# Patient Record
Sex: FEMALE | Race: WHITE | NOT HISPANIC OR LATINO | Employment: OTHER | URBAN - METROPOLITAN AREA
[De-identification: names, ages, dates, MRNs, and addresses within clinical notes are randomized per-mention and may not be internally consistent; named-entity substitution may affect disease eponyms.]

---

## 2017-12-09 ENCOUNTER — APPOINTMENT (OUTPATIENT)
Dept: CARDIOLOGY | Facility: HOSPITAL | Age: 77
End: 2017-12-09
Attending: INTERNAL MEDICINE

## 2017-12-09 ENCOUNTER — APPOINTMENT (OUTPATIENT)
Dept: GENERAL RADIOLOGY | Facility: HOSPITAL | Age: 77
End: 2017-12-09

## 2017-12-09 ENCOUNTER — HOSPITAL ENCOUNTER (INPATIENT)
Facility: HOSPITAL | Age: 77
LOS: 6 days | Discharge: NURSING, REHAB OR LONG-TERM CARE | End: 2017-12-15
Attending: EMERGENCY MEDICINE | Admitting: INTERNAL MEDICINE

## 2017-12-09 ENCOUNTER — APPOINTMENT (OUTPATIENT)
Dept: CT IMAGING | Facility: HOSPITAL | Age: 77
End: 2017-12-09

## 2017-12-09 DIAGNOSIS — IMO0001: Primary | ICD-10-CM

## 2017-12-09 DIAGNOSIS — Z74.09 IMPAIRED FUNCTIONAL MOBILITY, BALANCE, GAIT, AND ENDURANCE: ICD-10-CM

## 2017-12-09 DIAGNOSIS — Z74.09 IMPAIRED MOBILITY AND ADLS: ICD-10-CM

## 2017-12-09 DIAGNOSIS — Z78.9 IMPAIRED MOBILITY AND ADLS: ICD-10-CM

## 2017-12-09 PROBLEM — I62.9 INTRACRANIAL HEMORRHAGE (HCC): Status: ACTIVE | Noted: 2017-12-09

## 2017-12-09 PROBLEM — I10 ESSENTIAL HYPERTENSION: Status: ACTIVE | Noted: 2017-12-09

## 2017-12-09 PROBLEM — S06.89AA INTRACRANIAL HEMATOMA (HCC): Status: ACTIVE | Noted: 2017-12-09

## 2017-12-09 LAB
ALBUMIN SERPL-MCNC: 3.8 G/DL (ref 3.2–4.8)
ALBUMIN/GLOB SERPL: 1.4 G/DL (ref 1.5–2.5)
ALP SERPL-CCNC: 75 U/L (ref 25–100)
ALT SERPL W P-5'-P-CCNC: 7 U/L (ref 7–40)
ANION GAP SERPL CALCULATED.3IONS-SCNC: 6 MMOL/L (ref 3–11)
APTT PPP: 24 SECONDS (ref 24–31)
AST SERPL-CCNC: 12 U/L (ref 0–33)
BASOPHILS # BLD AUTO: 0.02 10*3/MM3 (ref 0–0.2)
BASOPHILS NFR BLD AUTO: 0.3 % (ref 0–1)
BH CV ECHO MEAS - AO MAX PG (FULL): 2.5 MMHG
BH CV ECHO MEAS - AO MAX PG: 9 MMHG
BH CV ECHO MEAS - AO MEAN PG (FULL): 1 MMHG
BH CV ECHO MEAS - AO MEAN PG: 5 MMHG
BH CV ECHO MEAS - AO ROOT AREA (BSA CORRECTED): 1.9
BH CV ECHO MEAS - AO ROOT AREA: 8 CM^2
BH CV ECHO MEAS - AO ROOT DIAM: 3.2 CM
BH CV ECHO MEAS - AO V2 MAX: 150 CM/SEC
BH CV ECHO MEAS - AO V2 MEAN: 92.5 CM/SEC
BH CV ECHO MEAS - AO V2 VTI: 26.7 CM
BH CV ECHO MEAS - ASC AORTA: 2.8 CM
BH CV ECHO MEAS - BSA(HAYCOCK): 1.7 M^2
BH CV ECHO MEAS - BSA: 1.7 M^2
BH CV ECHO MEAS - BZI_BMI: 27.4 KILOGRAMS/M^2
BH CV ECHO MEAS - BZI_METRIC_HEIGHT: 157.5 CM
BH CV ECHO MEAS - BZI_METRIC_WEIGHT: 68 KG
BH CV ECHO MEAS - EDV(CUBED): 108.5 ML
BH CV ECHO MEAS - EDV(TEICH): 106 ML
BH CV ECHO MEAS - EF(CUBED): 85.3 %
BH CV ECHO MEAS - EF(TEICH): 78.5 %
BH CV ECHO MEAS - ESV(CUBED): 16 ML
BH CV ECHO MEAS - ESV(TEICH): 22.8 ML
BH CV ECHO MEAS - FS: 47.2 %
BH CV ECHO MEAS - IVS/LVPW: 0.76
BH CV ECHO MEAS - IVSD: 1 CM
BH CV ECHO MEAS - LA DIMENSION: 3.2 CM
BH CV ECHO MEAS - LA/AO: 1
BH CV ECHO MEAS - LV MASS(C)D: 137.9 GRAMS
BH CV ECHO MEAS - LV MASS(C)DI: 81.6 GRAMS/M^2
BH CV ECHO MEAS - LV MAX PG: 6.5 MMHG
BH CV ECHO MEAS - LV MEAN PG: 4 MMHG
BH CV ECHO MEAS - LV V1 MAX: 127 CM/SEC
BH CV ECHO MEAS - LV V1 MEAN: 86.1 CM/SEC
BH CV ECHO MEAS - LV V1 VTI: 25.4 CM
BH CV ECHO MEAS - LVIDD: 4.8 CM
BH CV ECHO MEAS - LVIDS: 3 CM
BH CV ECHO MEAS - LVPWD: 1 CM
BH CV ECHO MEAS - MV A MAX VEL: 73.4 CM/SEC
BH CV ECHO MEAS - MV E MAX VEL: 56.2 CM/SEC
BH CV ECHO MEAS - MV E/A: 0.77
BH CV ECHO MEAS - PA ACC SLOPE: 694.5 CM/SEC^2
BH CV ECHO MEAS - PA ACC TIME: 0.14 SEC
BH CV ECHO MEAS - PA MAX PG: 10.1 MMHG
BH CV ECHO MEAS - PA PR(ACCEL): 16.5 MMHG
BH CV ECHO MEAS - PA V2 MAX: 158.5 CM/SEC
BH CV ECHO MEAS - RVDD: 1.9 CM
BH CV ECHO MEAS - SI(AO): 126.9 ML/M^2
BH CV ECHO MEAS - SI(CUBED): 54.7 ML/M^2
BH CV ECHO MEAS - SI(TEICH): 49.2 ML/M^2
BH CV ECHO MEAS - SV(AO): 214.7 ML
BH CV ECHO MEAS - SV(CUBED): 92.5 ML
BH CV ECHO MEAS - SV(TEICH): 83.2 ML
BH CV ECHO MEAS - TAPSE (>1.6): 1.8 CM2
BH CV XLRA - RV BASE: 2.8 CM
BH CV XLRA - RV LENGTH: 5.5 CM
BH CV XLRA - RV MID: 2.5 CM
BH CV XLRA - TDI S': 20.8 CM/SEC
BILIRUB SERPL-MCNC: 0.6 MG/DL (ref 0.3–1.2)
BUN BLD-MCNC: 11 MG/DL (ref 9–23)
BUN/CREAT SERPL: 11 (ref 7–25)
CALCIUM SPEC-SCNC: 8.9 MG/DL (ref 8.7–10.4)
CHLORIDE SERPL-SCNC: 102 MMOL/L (ref 99–109)
CO2 SERPL-SCNC: 27 MMOL/L (ref 20–31)
CREAT BLD-MCNC: 1 MG/DL (ref 0.6–1.3)
DEPRECATED RDW RBC AUTO: 46.4 FL (ref 37–54)
EOSINOPHIL # BLD AUTO: 0.03 10*3/MM3 (ref 0–0.3)
EOSINOPHIL NFR BLD AUTO: 0.4 % (ref 0–3)
ERYTHROCYTE [DISTWIDTH] IN BLOOD BY AUTOMATED COUNT: 14.4 % (ref 11.3–14.5)
GFR SERPL CREATININE-BSD FRML MDRD: 54 ML/MIN/1.73
GFR SERPL CREATININE-BSD FRML MDRD: 66 ML/MIN/1.73
GLOBULIN UR ELPH-MCNC: 2.7 GM/DL
GLUCOSE BLD-MCNC: 166 MG/DL (ref 70–100)
GLUCOSE BLDC GLUCOMTR-MCNC: 245 MG/DL (ref 70–130)
HCT VFR BLD AUTO: 36.6 % (ref 34.5–44)
HGB BLD-MCNC: 11.9 G/DL (ref 11.5–15.5)
HOLD SPECIMEN: NORMAL
HOLD SPECIMEN: NORMAL
IMM GRANULOCYTES # BLD: 0.01 10*3/MM3 (ref 0–0.03)
IMM GRANULOCYTES NFR BLD: 0.1 % (ref 0–0.6)
INR PPP: 1.03
LIPASE SERPL-CCNC: 22 U/L (ref 6–51)
LV EF 2D ECHO EST: 70 %
LYMPHOCYTES # BLD AUTO: 1.44 10*3/MM3 (ref 0.6–4.8)
LYMPHOCYTES NFR BLD AUTO: 19.9 % (ref 24–44)
MAXIMAL PREDICTED HEART RATE: 145 BPM
MCH RBC QN AUTO: 28.5 PG (ref 27–31)
MCHC RBC AUTO-ENTMCNC: 32.5 G/DL (ref 32–36)
MCV RBC AUTO: 87.8 FL (ref 80–99)
MONOCYTES # BLD AUTO: 0.45 10*3/MM3 (ref 0–1)
MONOCYTES NFR BLD AUTO: 6.2 % (ref 0–12)
NEUTROPHILS # BLD AUTO: 5.29 10*3/MM3 (ref 1.5–8.3)
NEUTROPHILS NFR BLD AUTO: 73.1 % (ref 41–71)
PLATELET # BLD AUTO: 224 10*3/MM3 (ref 150–450)
PMV BLD AUTO: 10.1 FL (ref 6–12)
POTASSIUM BLD-SCNC: 3.7 MMOL/L (ref 3.5–5.5)
PROT SERPL-MCNC: 6.5 G/DL (ref 5.7–8.2)
PROTHROMBIN TIME: 11.2 SECONDS (ref 9.6–11.5)
RBC # BLD AUTO: 4.17 10*6/MM3 (ref 3.89–5.14)
SODIUM BLD-SCNC: 135 MMOL/L (ref 132–146)
STRESS TARGET HR: 123 BPM
TROPONIN I SERPL-MCNC: 0.02 NG/ML
WBC NRBC COR # BLD: 7.24 10*3/MM3 (ref 3.5–10.8)
WHOLE BLOOD HOLD SPECIMEN: NORMAL
WHOLE BLOOD HOLD SPECIMEN: NORMAL

## 2017-12-09 PROCEDURE — 99285 EMERGENCY DEPT VISIT HI MDM: CPT

## 2017-12-09 PROCEDURE — 36415 COLL VENOUS BLD VENIPUNCTURE: CPT

## 2017-12-09 PROCEDURE — 70450 CT HEAD/BRAIN W/O DYE: CPT

## 2017-12-09 PROCEDURE — 85610 PROTHROMBIN TIME: CPT | Performed by: EMERGENCY MEDICINE

## 2017-12-09 PROCEDURE — 85730 THROMBOPLASTIN TIME PARTIAL: CPT | Performed by: EMERGENCY MEDICINE

## 2017-12-09 PROCEDURE — 93005 ELECTROCARDIOGRAM TRACING: CPT | Performed by: EMERGENCY MEDICINE

## 2017-12-09 PROCEDURE — 25010000002 DEXAMETHASONE PER 1 MG: Performed by: INTERNAL MEDICINE

## 2017-12-09 PROCEDURE — 80053 COMPREHEN METABOLIC PANEL: CPT | Performed by: EMERGENCY MEDICINE

## 2017-12-09 PROCEDURE — 82962 GLUCOSE BLOOD TEST: CPT

## 2017-12-09 PROCEDURE — 83690 ASSAY OF LIPASE: CPT | Performed by: EMERGENCY MEDICINE

## 2017-12-09 PROCEDURE — 71010 HC CHEST PA OR AP: CPT

## 2017-12-09 PROCEDURE — 99291 CRITICAL CARE FIRST HOUR: CPT | Performed by: INTERNAL MEDICINE

## 2017-12-09 PROCEDURE — 85025 COMPLETE CBC W/AUTO DIFF WBC: CPT | Performed by: EMERGENCY MEDICINE

## 2017-12-09 PROCEDURE — 93306 TTE W/DOPPLER COMPLETE: CPT | Performed by: INTERNAL MEDICINE

## 2017-12-09 PROCEDURE — 93306 TTE W/DOPPLER COMPLETE: CPT

## 2017-12-09 PROCEDURE — 84484 ASSAY OF TROPONIN QUANT: CPT | Performed by: EMERGENCY MEDICINE

## 2017-12-09 RX ORDER — DEXTROSE MONOHYDRATE 25 G/50ML
25 INJECTION, SOLUTION INTRAVENOUS
Status: DISCONTINUED | OUTPATIENT
Start: 2017-12-09 | End: 2017-12-15 | Stop reason: HOSPADM

## 2017-12-09 RX ORDER — MAGNESIUM SULFATE HEPTAHYDRATE 40 MG/ML
4 INJECTION, SOLUTION INTRAVENOUS AS NEEDED
Status: DISCONTINUED | OUTPATIENT
Start: 2017-12-09 | End: 2017-12-15 | Stop reason: HOSPADM

## 2017-12-09 RX ORDER — ACETAMINOPHEN 325 MG/1
650 TABLET ORAL EVERY 4 HOURS PRN
Status: DISCONTINUED | OUTPATIENT
Start: 2017-12-09 | End: 2017-12-15 | Stop reason: HOSPADM

## 2017-12-09 RX ORDER — POTASSIUM CHLORIDE 750 MG/1
40 CAPSULE, EXTENDED RELEASE ORAL AS NEEDED
Status: DISCONTINUED | OUTPATIENT
Start: 2017-12-09 | End: 2017-12-15 | Stop reason: HOSPADM

## 2017-12-09 RX ORDER — FAMOTIDINE 10 MG/ML
20 INJECTION, SOLUTION INTRAVENOUS EVERY 12 HOURS SCHEDULED
Status: DISCONTINUED | OUTPATIENT
Start: 2017-12-09 | End: 2017-12-12 | Stop reason: CLARIF

## 2017-12-09 RX ORDER — POTASSIUM CHLORIDE 7.45 MG/ML
10 INJECTION INTRAVENOUS
Status: DISCONTINUED | OUTPATIENT
Start: 2017-12-09 | End: 2017-12-15 | Stop reason: HOSPADM

## 2017-12-09 RX ORDER — MAGNESIUM SULFATE HEPTAHYDRATE 40 MG/ML
2 INJECTION, SOLUTION INTRAVENOUS AS NEEDED
Status: DISCONTINUED | OUTPATIENT
Start: 2017-12-09 | End: 2017-12-15 | Stop reason: HOSPADM

## 2017-12-09 RX ORDER — NICOTINE POLACRILEX 4 MG
15 LOZENGE BUCCAL
Status: DISCONTINUED | OUTPATIENT
Start: 2017-12-09 | End: 2017-12-15 | Stop reason: HOSPADM

## 2017-12-09 RX ORDER — RANITIDINE 150 MG/1
300 TABLET ORAL 2 TIMES DAILY
COMMUNITY
End: 2017-12-09

## 2017-12-09 RX ORDER — DEXAMETHASONE SODIUM PHOSPHATE 4 MG/ML
8 INJECTION, SOLUTION INTRA-ARTICULAR; INTRALESIONAL; INTRAMUSCULAR; INTRAVENOUS; SOFT TISSUE ONCE
Status: COMPLETED | OUTPATIENT
Start: 2017-12-09 | End: 2017-12-09

## 2017-12-09 RX ORDER — RANITIDINE 300 MG/1
300 TABLET ORAL 2 TIMES DAILY
COMMUNITY

## 2017-12-09 RX ORDER — ASPIRIN 81 MG/1
81 TABLET ORAL DAILY
COMMUNITY
End: 2017-12-15 | Stop reason: HOSPADM

## 2017-12-09 RX ORDER — PHENYLEPHRINE HCL IN 0.9% NACL 0.5 MG/5ML
.5-3 SYRINGE (ML) INTRAVENOUS
Status: DISCONTINUED | OUTPATIENT
Start: 2017-12-09 | End: 2017-12-12

## 2017-12-09 RX ORDER — ASPIRIN 81 MG/1
81 TABLET, CHEWABLE ORAL DAILY
COMMUNITY
End: 2017-12-09

## 2017-12-09 RX ORDER — SODIUM CHLORIDE 9 MG/ML
75 INJECTION, SOLUTION INTRAVENOUS CONTINUOUS
Status: DISCONTINUED | OUTPATIENT
Start: 2017-12-09 | End: 2017-12-11

## 2017-12-09 RX ORDER — NITROGLYCERIN 0.4 MG/1
0.4 TABLET SUBLINGUAL
COMMUNITY
End: 2017-12-15 | Stop reason: HOSPADM

## 2017-12-09 RX ORDER — PROPRANOLOL HYDROCHLORIDE 60 MG/1
60 TABLET ORAL 2 TIMES DAILY
Status: ON HOLD | COMMUNITY
End: 2017-12-11

## 2017-12-09 RX ORDER — POTASSIUM CHLORIDE 1.5 G/1.77G
40 POWDER, FOR SOLUTION ORAL AS NEEDED
Status: DISCONTINUED | OUTPATIENT
Start: 2017-12-09 | End: 2017-12-15 | Stop reason: HOSPADM

## 2017-12-09 RX ORDER — AMLODIPINE BESYLATE 2.5 MG/1
2.5 TABLET ORAL DAILY
COMMUNITY
End: 2018-01-05 | Stop reason: HOSPADM

## 2017-12-09 RX ORDER — ACETAMINOPHEN 650 MG/1
650 SUPPOSITORY RECTAL EVERY 4 HOURS PRN
Status: DISCONTINUED | OUTPATIENT
Start: 2017-12-09 | End: 2017-12-15 | Stop reason: HOSPADM

## 2017-12-09 RX ORDER — SODIUM CHLORIDE 0.9 % (FLUSH) 0.9 %
10 SYRINGE (ML) INJECTION AS NEEDED
Status: DISCONTINUED | OUTPATIENT
Start: 2017-12-09 | End: 2017-12-09 | Stop reason: SDUPTHER

## 2017-12-09 RX ORDER — FENTANYL CITRATE 50 UG/ML
25 INJECTION, SOLUTION INTRAMUSCULAR; INTRAVENOUS
Status: DISCONTINUED | OUTPATIENT
Start: 2017-12-09 | End: 2017-12-12

## 2017-12-09 RX ORDER — SIMVASTATIN 20 MG
20 TABLET ORAL NIGHTLY
COMMUNITY

## 2017-12-09 RX ORDER — SODIUM CHLORIDE 0.9 % (FLUSH) 0.9 %
1-10 SYRINGE (ML) INJECTION AS NEEDED
Status: DISCONTINUED | OUTPATIENT
Start: 2017-12-09 | End: 2017-12-15 | Stop reason: HOSPADM

## 2017-12-09 RX ORDER — ONDANSETRON 2 MG/ML
4 INJECTION INTRAMUSCULAR; INTRAVENOUS EVERY 6 HOURS PRN
Status: DISCONTINUED | OUTPATIENT
Start: 2017-12-09 | End: 2017-12-15 | Stop reason: HOSPADM

## 2017-12-09 RX ADMIN — NICARDIPINE HYDROCHLORIDE 7.5 MG/HR: 0.1 INJECTION, SOLUTION INTRAVENOUS at 16:22

## 2017-12-09 RX ADMIN — SODIUM CHLORIDE 75 ML/HR: 9 INJECTION, SOLUTION INTRAVENOUS at 17:15

## 2017-12-09 RX ADMIN — NICARDIPINE HYDROCHLORIDE 12.5 MG/HR: 0.1 INJECTION, SOLUTION INTRAVENOUS at 20:13

## 2017-12-09 RX ADMIN — NICARDIPINE HYDROCHLORIDE 10 MG/HR: 0.1 INJECTION, SOLUTION INTRAVENOUS at 23:00

## 2017-12-09 RX ADMIN — NICARDIPINE HYDROCHLORIDE 10 MG/HR: 0.1 INJECTION, SOLUTION INTRAVENOUS at 18:29

## 2017-12-09 RX ADMIN — NICARDIPINE HYDROCHLORIDE 5 MG/HR: 0.1 INJECTION, SOLUTION INTRAVENOUS at 15:43

## 2017-12-09 RX ADMIN — FAMOTIDINE 20 MG: 10 INJECTION, SOLUTION INTRAVENOUS at 20:08

## 2017-12-09 RX ADMIN — DEXAMETHASONE SODIUM PHOSPHATE 8 MG: 4 INJECTION, SOLUTION INTRAMUSCULAR; INTRAVENOUS at 17:27

## 2017-12-09 NOTE — H&P
ICU ADMISSION NOTE    Chief complaint sided weakness    Subjective     Patient is a 77 y.o. female was not answering her daughter this morning. She lives next door to her sister-in-law who has a key. When she entered the house the patient was still in bed and could not move her left side and was mumbling. She called the ambulance. She did talk to a family member on the phone last night and was normal. In the emergency room her CT scan revealed a right frontal lobe hemorrhage with edema and mass effect. She had significant hypertension and was started on a Cardene drip. Initially she was not moving her left side but her symptoms did not improve and she was able to lift her left leg. She does have a history of transient ischemic attacks and had one approximately one week ago. There is no history of atrial fibrillation she is a lifetime nonsmoker. She does use some nitroglycerin but denies ever having a heart attack. She is a lifetime nonsmoker.    Review of Systems  Review of Systems   Constitutional: Positive for activity change. Negative for fever.   Eyes: Positive for visual disturbance.   Respiratory: Negative.    Cardiovascular: Negative.    Gastrointestinal: Negative.    Musculoskeletal: Positive for arthralgias.   Neurological: Positive for facial asymmetry, speech difficulty and weakness. Negative for headaches.        Home Medications  Currently her granddaughter in law is at the bedside and does not know what her home medications include      (Not in a hospital admission)    History  Past Medical History:   Diagnosis Date   • Dementia    • Diabetes mellitus    • GERD (gastroesophageal reflux disease)    • Hypertension    • Rheumatoid arthritis    • TIA (transient ischemic attack)      Past Surgical History:   Procedure Laterality Date   • CATARACT EXTRACTION, BILATERAL     • EYE SURGERY       Family History   Problem Relation Age of Onset   • Family history unknown: Yes     Social History   Substance Use  "Topics   • Smoking status: Never Smoker   • Smokeless tobacco: None   • Alcohol use No       (Not in a hospital admission)  Allergies:  Penicillins      Objective     Vital Signs  Blood pressure 153/61, pulse 105, temperature 98.7 °F (37.1 °C), temperature source Oral, resp. rate 18, height 157.5 cm (62\"), weight 68 kg (150 lb), SpO2 95 %.    Physical Exam:  General Appearance:  Thin elderly white woman in no distress    Head:  Normocephalic, atraumatic    Eyes:          Eyes are deviated to the right, pupils are equal and reactive    Ears:     Throat: Oral mucosa is moist    Neck: Neck is supple, no carotid bruit    Back:      Lungs:   Symmetric chest expansion without wheeze or rhonchi     Heart:  Irregular, frequent early beats, S1, S2 auscultated  SERGEI   Abdomen:   L sounds are present, nondistended, soft    Rectal:     Deferred   Extremities:    No edema or cyanosis    Pulses:   Pedal pulses present    Skin: Warm and dry    Lymph nodes:    Neurologic:   Left upper extremity is weak, 4 out of 5 and has a resting tremor. Left upper extremity is 4 out of 5. She can lift it off the bed and hold it but it will drift downward. Right side is 5 out of 5 upper and lower extremities. She is oriented to person and hospital but does not know the date. Eyes are deviated to the right and she neglects her left side. Left facial weakness. Speech is fluent.        Results Review:   Lab Results (last 24 hours)     Procedure Component Value Units Date/Time    Lavender Top [[id number]] Collected:  12/09/17 1539    Specimen:  Blood Updated:  12/09/17 1542    Gold Top - SST [[id number]] Collected:  12/09/17 1539    Specimen:  Blood Updated:  12/09/17 1542    Green Top (Gel) [[id number]] Collected:  12/09/17 1539    Specimen:  Blood Updated:  12/09/17 1542    Light Blue Top [[id number]] Collected:  12/09/17 1539    Specimen:  Blood Updated:  12/09/17 1542    [place] Draw [[id number]] Collected:  12/09/17 1539    Specimen:  Blood Updated:  " 12/09/17 1545    Narrative:       The following orders were created for panel order [place] [facility].  Procedure                               Abnormality         Status                     ---------                               -----------         ------                     Light Blue Top[[id number]]                                   In process                 Green Top (Gel)[[id number]]                                  In process                 Lavender Top[[id number]]                                     In process                 Gold Top - SST[[id number]]                                   In process                 Green Top (No Gel)[[id number]]                                                            Please view results for these tests on the individual orders.    CBC & Differential [[id number]] Collected:  12/09/17 1539    Specimen:  Blood Updated:  12/09/17 1547    Narrative:       The following orders were created for panel order CBC & Differential.  Procedure                               Abnormality         Status                     ---------                               -----------         ------                     CBC Auto Differential[[id number]]        Abnormal            Final result                 Please view results for these tests on the individual orders.    CBC Auto Differential [[id number]]  (Abnormal) Collected:  12/09/17 1539    Specimen:  Blood Updated:  12/09/17 1547     WBC 7.24 10*3/mm3      RBC 4.17 10*6/mm3      Hemoglobin 11.9 g/dL      Hematocrit 36.6 %      MCV 87.8 fL      MCH 28.5 pg      MCHC 32.5 g/dL      RDW 14.4 %      RDW-SD 46.4 fl      MPV 10.1 fL      Platelets 224 10*3/mm3      Neutrophil % 73.1 (H) %      Lymphocyte % 19.9 (L) %      Monocyte % 6.2 %      Eosinophil % 0.4 %      Basophil % 0.3 %      Immature Grans % 0.1 %      Neutrophils, Absolute 5.29 10*3/mm3      Lymphocytes, Absolute 1.44 10*3/mm3      Monocytes, Absolute 0.45 10*3/mm3      Eosinophils, Absolute 0.03  10*3/mm3      Basophils, Absolute 0.02 10*3/mm3      Immature Grans, Absolute 0.01 10*3/mm3     Comprehensive Metabolic Panel [[id number]]  (Abnormal) Collected:  12/09/17 1539    Specimen:  Blood Updated:  12/09/17 1610     Glucose 166 (H) mg/dL      BUN 11 mg/dL      Creatinine 1.00 mg/dL      Sodium 135 mmol/L      Potassium 3.7 mmol/L      Chloride 102 mmol/L      CO2 27.0 mmol/L      Calcium 8.9 mg/dL      Total Protein 6.5 g/dL      Albumin 3.80 g/dL      ALT (SGPT) 7 U/L      AST (SGOT) 12 U/L      Alkaline Phosphatase 75 U/L      Total Bilirubin 0.6 mg/dL      eGFR Non African Amer 54 (L) mL/min/1.73      eGFR  African Amer 66 mL/min/1.73      Globulin 2.7 gm/dL      A/G Ratio 1.4 (L) g/dL      BUN/Creatinine Ratio 11.0     Anion Gap 6.0 mmol/L     Narrative:       [employer] Guidelines    Stage     Description        GFR  1         Normal or High     90+  2         Mild decrease      60-89  3         Moderate decrease  30-59  4         Severe decrease    15-29  5         Kidney failure     <15    Lipase [[id number]]  (Normal) Collected:  12/09/17 1539    Specimen:  Blood Updated:  12/09/17 1610     Lipase 22 U/L     Troponin [[id number]]  (Normal) Collected:  12/09/17 1539    Specimen:  Blood Updated:  12/09/17 1614     Troponin I 0.018 ng/mL     Protime-INR [[id number]] Collected:  12/09/17 1539    Specimen:  Blood Updated:  12/09/17 1620     Protime 11.2 Seconds      INR 1.03    Narrative:       Therapeutic Ranges for INR: 2.0-3.0 (PT 20-30)                              2.5-3.5 (PT 25-34)    aPTT [[id number]]  (Normal) Collected:  12/09/17 1539    Specimen:  Blood Updated:  12/09/17 1620     PTT 24.0 seconds     Narrative:       PTT = The equivalent PTT values for the therapeutic range of heparin levels at 0.3 to 0.5 U/ml are 45 to 60 seconds.        Imaging Results (last 24 hours)     Procedure Component Value Units Date/Time    CT Head Without Contrast Stroke Protocol [[id number]] Collected:   12/09/17 1531     Updated:  12/09/17 1546    Narrative:       EXAMINATION: CT HEAD WO CONTRAST STROKE PROTOCOL-      INDICATION: Stroke Protocol (onset > 12 hrs)         TECHNIQUE:      CT data set of the brain was performed without intravenous contrast.     The radiation dose reduction device was turned on for each scan per the  ALARA (As Low as Reasonably Achievable) protocol.     COMPARISON: NONE     FINDINGS:   1.Intra-axial dominant right cerebral hemorrhage is seen in the right  frontal parietal region with a rounded lobular configuration of high  attenuation intra-axial blood surrounded by edema and creating mass  effect. The intra-axial bleed measures 3.7 x 3.8 cm. Volumetric  measurement is 12.3 cubic centimeters.  2. A second intra-axial hemorrhage is identified in the right occipital  area. This could represent a calcified meningioma along the posterior  right falx but a small petechial hemorrhage cannot be excluded and this  measures 1.4 cm.  3. There is evidence of a subarachnoid component over the right  convexity with a small petechial hemorrhages at the right convexity.  There is mass effect with edema and there is a slight midline shift from  right to left.             Impression:          Intra-axial cerebral bleed, right frontal parietal, with surrounding  edema and mass effect. Measurements and volumetric measurements are  offered above.     There is a 14 mm high attenuation structure adjacent to the posterior  right falx cerebri which could be a densely calcified meningioma or  second hemorrhage.     There is a small area of subarachnoid bleed along the right parietal  convexity in the sulcal GYRAL recesses.     There is considerable mass effect in the right hemisphere.              This report was finalized on 12/9/2017 3:44 PM by Dr. Jaxon Martins MD.       XR Chest 1 View [[id number]] Updated:  12/09/17 1616       Test Reason : Stroke Protocol (onset > 12 hrs)  Blood Pressure : **/**  mmHG  Vent. Rate : 071 BPM     Atrial Rate : 071 BPM     P-R Int : 202 ms          QRS Dur : 114 ms      QT Int : 448 ms       P-R-T Axes : 090 -45 020 degrees     QTc Int : 486 ms    Normal sinus rhythm  Incomplete right bundle branch block  Left anterior fascicular block  Moderate voltage criteria for LVH, may be normal variant  Abnormal ECG  No previous ECGs available  Confirmed by JULY OLIVIA, HOLIS ([id number]) on 12/9/2017 3:55:18 PM    PROBLEM LIST    #1  intracranial hemorrhage, right frontal lobe with edema and mass effect resulting in left facial and left-sided weakness. There is also a possible meningioma. This is likely a hypertensive bleed. He has no history of head trauma.    #2  poorly controlled hypertension currently on a Cardene drip with improvement.    #3  underlying dementia with a history of transient ischemic attacks    #4  stress hyperglycemia    Plan:  Cardene drip for hypertension  Neuro checks every hour, NIHSS  Speech, physical therapy, occupational therapy evaluation  Echocardiogram, carotid duplex to evaluate her transient ischemic attacks  Neurosurgery to evaluate  Consider Decadron for edema  Sliding-scale insulin  Once she passes a swallow evaluation consider oral antihypertensives    Aury Espitia MD  12/09/17  4:40 PM    Time: 55min    This note was produced with a voice recognition program and may have uncorrected errors.

## 2017-12-09 NOTE — CONSULTS
"NEUROSURGERY CONSULTATION    Referring Provider: Juan Miguel Van MD    Patient Care Team:  No Known Provider as PCP - General    Chief Complaint: Left-sided weakness.    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  Several days ago she apparently developed some mouth drooping which resolved.  She did talk with family members last evening and everything was in order.  Today she did not answer and she was found to be in bed with left sided weakness and mumbling speech.  She was brought to the emergency room where her blood pressure was noted to be elevated.  She does have a history of hypertension.  She is not on blood thinners.      Review of Systems:  Musculoskeletal and Neurological systems were reviewed and are negative except for:  Neurological: positive for headaches and weakness    History:  Past Medical History:   Diagnosis Date   • Dementia    • Diabetes mellitus    • GERD (gastroesophageal reflux disease)    • Hypertension    • Rheumatoid arthritis    • TIA (transient ischemic attack)    , Past Surgical History:   Procedure Laterality Date   • CATARACT EXTRACTION, BILATERAL     • EYE SURGERY     , Family History   Problem Relation Age of Onset   • Family history unknown: Yes   , Social History   Substance Use Topics   • Smoking status: Never Smoker   • Smokeless tobacco: None   • Alcohol use No   ,   (Not in a hospital admission) and Allergies:  Penicillins      Physical Exam:  Vital Signs: Blood pressure 138/73, pulse 106, temperature 98.7 °F (37.1 °C), temperature source Oral, resp. rate 18, height 157.5 cm (62\"), weight 68 kg (150 lb), SpO2 96 %.   There is no overt cranial trauma.  There is no raccoon's sign or garcía sign.  MUSCULOSKELETAL:  Neck tenderness to palpation is not observed.  Neck is supple.  ROM in neck is normal.  NEUROLOGICAL:  Strength is intact on her right side.  On the left side her strength is 3-4 " minus/5.  Muscle tone is normal throughout.  Sensation is diminished on the left side.  There is left-sided neglect.  Deep tendon reflexes are 1+ and symmetrical.  Maritza's Sign is negative bilaterally.   CRANIAL NERVES:  Cranial Nerve II:  Visual fields are full to confrontation.  Cranial Nerve III, IV, and VI: PERRLADC.  Right eye deviation is noted.  Nystagmus is not present.  Cranial Nerve V: Facial sensation is intact to light touch.  Cranial Nerve VII: Muscles of facial expression demonstate left facial weakness.  Cranial Nerve VIII: Hearing is intact to finger rub bilaterally.  Cranial Nerve IX and X: Palate elevates symmetrically.  Cranial Nerve XI: Shoulder shrug is intact bilaterally.  Cranial Nerve XII: Tongue is midline without evidence of atrophy or fasciculation.      Data Review:  CT of the brain demonstrates diffuse atrophy.  There are multiple areas of hemorrhage with the largest being in the right frontal region.  This was measured by the radiologist to be 3.7 x 3.8 cm.  Volume was said to be 12.3 mL's.  There is modest right to left shift.  There is a smaller area of hemorrhage posterior and superior to the ICH noted above.  A third area of hemorrhage is noted just right of midline in the occipital region.    Diagnosis:  Multiple areas of hemorrhage potentially secondary to hemorrhagic embolic infarction.    Treatment Recommendations:  The patient is to be admitted to the intensive care unit for observation and monitoring.  Follow-up CT will be performed in the morning.  If the large area of hemorrhage increases in size or she develops significant swelling then craniotomy will be a consideration.      Leo Proctor MD  12/09/17  6:43 PM

## 2017-12-09 NOTE — ED PROVIDER NOTES
Subjective   HPI Comments: Ms. Lois Brennan is a 77 y.o. female who presents to the ED with c/o stroke sx. EMS brings in this patient after her neighbor called in for a wellness check. No one had been able to contact her since last night and family had been concerned. EMS notes of initial right sided facial droop but that has improved at first. She had right sided eye deviation with left sided paralysis. Pt herself notes of no hx of CVA that she knows of and denies any recent headaches, falls, head trauma or anticoagulation use. She has no other acute sx at this time. Last known well was yesterday evening.     Patient is a 77 y.o. female presenting with neurologic complaint.   History provided by:  EMS personnel  History limited by:  Acuity of condition  Neurologic Problem   The patient's primary symptoms include focal weakness and weakness (Left sided paralysis). This is a new problem. The current episode started today. The neurological problem developed suddenly. Last Known Well Instant: Yesterday evening.  There was facial and right-sided focality noted. Pertinent negatives include no headaches. There is no history of a CVA or head trauma.       Review of Systems   Unable to perform ROS: Acuity of condition   Neurological: Positive for focal weakness, facial asymmetry (Right) and weakness (Left sided paralysis). Negative for headaches.       Past Medical History:   Diagnosis Date   • Arthritis    • Dementia    • Diabetes mellitus    • GERD (gastroesophageal reflux disease)    • Hypertension    • TIA (transient ischemic attack)        Allergies   Allergen Reactions   • Penicillins      unknown       Past Surgical History:   Procedure Laterality Date   • EYE SURGERY         History reviewed. No pertinent family history.    Social History     Social History   • Marital status: N/A     Spouse name: N/A   • Number of children: N/A   • Years of education: N/A     Social History Main Topics   • Smoking status: Never  Smoker   • Smokeless tobacco: None   • Alcohol use No   • Drug use: No   • Sexual activity: Defer     Other Topics Concern   • None     Social History Narrative   • None         Objective   Physical Exam   Constitutional: She appears well-developed and well-nourished. No distress.   HENT:   Head: Normocephalic and atraumatic.   Nose: Nose normal.   Eyes: Conjunctivae are normal. No scleral icterus.   Right sided eye deviation   Neck: Normal range of motion. Neck supple.   Cardiovascular: Normal rate, regular rhythm, normal heart sounds and intact distal pulses.    No murmur heard.  Pulmonary/Chest: Effort normal and breath sounds normal. No respiratory distress.   Abdominal: Soft. There is no tenderness.   Musculoskeletal: Normal range of motion.   Neurological: She is alert.   Left sided neglect   Skin: Skin is warm and dry. She is not diaphoretic.   Psychiatric: She has a normal mood and affect. Her behavior is normal.   Nursing note and vitals reviewed.      Critical Care  Performed by: ELIA VAN  Authorized by: ELIA VAN     Critical care provider statement:     Critical care time (minutes):  40    Critical care time was exclusive of:  Separately billable procedures and treating other patients    Critical care was necessary to treat or prevent imminent or life-threatening deterioration of the following conditions:  CNS failure or compromise    Critical care was time spent personally by me on the following activities:  Evaluation of patient's response to treatment, examination of patient, obtaining history from patient or surrogate, ordering and performing treatments and interventions, ordering and review of laboratory studies, ordering and review of radiographic studies, re-evaluation of patient's condition, development of treatment plan with patient or surrogate and discussions with consultants               ED Course  ED Course   Comment By Time   HH score of 1. Isabelle Arambula 12/09 1536   Dr. Van  discussed case with Dr. Barboza, on call stroke neurologist, who recommends admission to the ICU. [facility] 12/09 1538   Dr. Van discussed case with Dr. Espitia, intensivist, who will admit the patient. [facility] 12/09 1611   Patient is placed on a Cardene drip.  Her pressure has progressively declined.  Laboratory evaluation is fairly unrewarding.  This is mildly elevated.  Platelet count is normal. Juan Miguel Van MD 12/09 1617   Blood pressures improved.  Her left sided neglect has actually improved with blood pressure management.Patient is seen and evaluated by Dr Ludwig in the emergency department.  HeFamily is arrived and is aware of status and diagnosis.  Patient is waiting transfer to the ICU now. Juan Miguel Van MD 12/09 1628     Recent Results (from the past 24 hour(s))   CBC Auto Differential    Collection Time: 12/09/17  3:39 PM   Result Value Ref Range    WBC 7.24 3.50 - 10.80 10*3/mm3    RBC 4.17 3.89 - 5.14 10*6/mm3    Hemoglobin 11.9 11.5 - 15.5 g/dL    Hematocrit 36.6 34.5 - 44.0 %    MCV 87.8 80.0 - 99.0 fL    MCH 28.5 27.0 - 31.0 pg    MCHC 32.5 32.0 - 36.0 g/dL    RDW 14.4 11.3 - 14.5 %    RDW-SD 46.4 37.0 - 54.0 fl    MPV 10.1 6.0 - 12.0 fL    Platelets 224 150 - 450 10*3/mm3    Neutrophil % 73.1 (H) 41.0 - 71.0 %    Lymphocyte % 19.9 (L) 24.0 - 44.0 %    Monocyte % 6.2 0.0 - 12.0 %    Eosinophil % 0.4 0.0 - 3.0 %    Basophil % 0.3 0.0 - 1.0 %    Immature Grans % 0.1 0.0 - 0.6 %    Neutrophils, Absolute 5.29 1.50 - 8.30 10*3/mm3    Lymphocytes, Absolute 1.44 0.60 - 4.80 10*3/mm3    Monocytes, Absolute 0.45 0.00 - 1.00 10*3/mm3    Eosinophils, Absolute 0.03 0.00 - 0.30 10*3/mm3    Basophils, Absolute 0.02 0.00 - 0.20 10*3/mm3    Immature Grans, Absolute 0.01 0.00 - 0.03 10*3/mm3   Comprehensive Metabolic Panel    Collection Time: 12/09/17  3:39 PM   Result Value Ref Range    Glucose 166 (H) 70 - 100 mg/dL    BUN 11 9 - 23 mg/dL    Creatinine 1.00 0.60 - 1.30 mg/dL    Sodium 135 132 -  146 mmol/L    Potassium 3.7 3.5 - 5.5 mmol/L    Chloride 102 99 - 109 mmol/L    CO2 27.0 20.0 - 31.0 mmol/L    Calcium 8.9 8.7 - 10.4 mg/dL    Total Protein 6.5 5.7 - 8.2 g/dL    Albumin 3.80 3.20 - 4.80 g/dL    ALT (SGPT) 7 7 - 40 U/L    AST (SGOT) 12 0 - 33 U/L    Alkaline Phosphatase 75 25 - 100 U/L    Total Bilirubin 0.6 0.3 - 1.2 mg/dL    eGFR Non African Amer 54 (L) >60 mL/min/1.73    eGFR  African Amer 66 >60 mL/min/1.73    Globulin 2.7 gm/dL    A/G Ratio 1.4 (L) 1.5 - 2.5 g/dL    BUN/Creatinine Ratio 11.0 7.0 - 25.0    Anion Gap 6.0 3.0 - 11.0 mmol/L   Protime-INR    Collection Time: 12/09/17  3:39 PM   Result Value Ref Range    Protime 11.2 9.6 - 11.5 Seconds    INR 1.03    aPTT    Collection Time: 12/09/17  3:39 PM   Result Value Ref Range    PTT 24.0 24.0 - 31.0 seconds   Lipase    Collection Time: 12/09/17  3:39 PM   Result Value Ref Range    Lipase 22 6 - 51 U/L   Troponin    Collection Time: 12/09/17  3:39 PM   Result Value Ref Range    Troponin I 0.018 <=0.039 ng/mL     Note: In addition to lab results from this visit, the labs listed above may include labs taken at another facility or during a different encounter within the last 24 hours. Please correlate lab times with ED admission and discharge times for further clarification of the services performed during this visit.    CT Head Without Contrast Stroke Protocol   Final Result       Intra-axial cerebral bleed, right frontal parietal, with surrounding   edema and mass effect. Measurements and volumetric measurements are   offered above.       There is a 14 mm high attenuation structure adjacent to the posterior   right falx cerebri which could be a densely calcified meningioma or   second hemorrhage.       There is a small area of subarachnoid bleed along the right parietal   convexity in the sulcal GYRAL recesses.       There is considerable mass effect in the right hemisphere.                   This report was finalized on 12/9/2017 3:44 PM by  "Dr. Jaxon Martins MD.          XR Chest 1 View    (Results Pending)     Vitals:    12/09/17 1554 12/09/17 1600 12/09/17 1605 12/09/17 1615   BP: 170/76 157/79  156/65   BP Location:       Patient Position:       Pulse: 90 88  96   Resp:       Temp:       TempSrc:       SpO2: 96% 96%  94%   Weight:       Height:   157.5 cm (62\")      Medications   sodium chloride 0.9 % flush 1-10 mL (not administered)   sodium chloride 0.9 % infusion (not administered)   acetaminophen (TYLENOL) tablet 650 mg (not administered)     Or   acetaminophen (TYLENOL) suppository 650 mg (not administered)   fentaNYL citrate (PF) (SUBLIMAZE) injection 25 mcg (not administered)   niCARdipine (CARDENE-IV) 20 mg/200 mL (0.1 mg/mL) in 0.9% NaCl infusion (7.5 mg/hr Intravenous New Bag 12/9/17 1622)   phenylephrine (ESTEBAN-SYNEPHRINE) 50 mg/250 mL (0.2 mg/mL) in 0.9% NS  infusion (not administered)   potassium chloride (MICRO-K) CR capsule 40 mEq (not administered)     Or   potassium chloride (KLOR-CON) packet 40 mEq (not administered)     Or   potassium chloride 10 mEq in 100 mL IVPB (not administered)   Magnesium Sulfate 2 gram Bolus, followed by 8 gram infusion (total Mg dose 10 grams)- Mg less than or equal to 1mg/dL (not administered)     Or   Magnesium Sulfate 6 gram Infusion (2 gm x 3) -Mg 1.1 -1.5 mg/dL (not administered)     Or   magnesium sulfate 4 gram infusion- Mg 1.6-1.9 mg/dL (not administered)   potassium phosphate 45 mmol in sodium chloride 0.9 % 500 mL infusion (not administered)     Or   potassium phosphate 30 mmol in sodium chloride 0.9 % 250 mL infusion (not administered)     Or   potassium phosphate 15 mmol in sodium chloride 0.9 % 100 mL infusion (not administered)     Or   sodium phosphates 45 mmol in sodium chloride 0.9 % 500 mL IVPB (not administered)     Or   sodium phosphates 30 mmol in sodium chloride 0.9 % 250 mL IVPB (not administered)     Or   sodium phosphates 15 mmol in sodium chloride 0.9 % 250 mL IVPB (not " administered)   ondansetron (ZOFRAN) injection 4 mg (not administered)   dextrose (GLUTOSE) oral gel 15 g (not administered)   dextrose (D50W) solution 25 g (not administered)   glucagon (human recombinant) (GLUCAGEN DIAGNOSTIC) injection 1 mg (not administered)   insulin regular (humuLIN R,novoLIN R) injection 0-7 Units (not administered)     ECG/EMG Results (last 24 hours)     ** No results found for the last 24 hours. **                        [facility]    Final diagnoses:   Intracranial hematoma, right, without loss of consciousness, initial encounter       Documentation assistance provided by aguila LI.  Information recorded by the scribe was done at my direction and has been verified and validated by me.     Isabelle Li  12/09/17 4020       Juan Miguel Van MD  12/09/17 3800

## 2017-12-10 ENCOUNTER — APPOINTMENT (OUTPATIENT)
Dept: CARDIOLOGY | Facility: HOSPITAL | Age: 77
End: 2017-12-10
Attending: INTERNAL MEDICINE

## 2017-12-10 ENCOUNTER — APPOINTMENT (OUTPATIENT)
Dept: CT IMAGING | Facility: HOSPITAL | Age: 77
End: 2017-12-10

## 2017-12-10 PROBLEM — E11.8 TYPE 2 DIABETES MELLITUS WITH COMPLICATION, WITHOUT LONG-TERM CURRENT USE OF INSULIN (HCC): Status: ACTIVE | Noted: 2017-12-10

## 2017-12-10 LAB
ARTICHOKE IGE QN: 113 MG/DL (ref 0–130)
BH CV ECHO MEAS - BSA(HAYCOCK): 1.7 M^2
BH CV ECHO MEAS - BSA: 1.7 M^2
BH CV ECHO MEAS - BZI_BMI: 27.4 KILOGRAMS/M^2
BH CV ECHO MEAS - BZI_METRIC_HEIGHT: 157.5 CM
BH CV ECHO MEAS - BZI_METRIC_WEIGHT: 68 KG
BH CV XLRA MEAS LEFT CCA RATIO VEL: 142 CM/SEC
BH CV XLRA MEAS LEFT DIST CCA EDV: 12.6 CM/SEC
BH CV XLRA MEAS LEFT DIST CCA PSV: 143.3 CM/SEC
BH CV XLRA MEAS LEFT DIST ICA EDV: 14 CM/SEC
BH CV XLRA MEAS LEFT DIST ICA PSV: 133.6 CM/SEC
BH CV XLRA MEAS LEFT ICA RATIO VEL: 99.5 CM/SEC
BH CV XLRA MEAS LEFT ICA/CCA RATIO: 0.7
BH CV XLRA MEAS LEFT MID CCA EDV: 22.6 CM/SEC
BH CV XLRA MEAS LEFT MID CCA PSV: 130.7 CM/SEC
BH CV XLRA MEAS LEFT MID ICA EDV: 15.7 CM/SEC
BH CV XLRA MEAS LEFT MID ICA PSV: 100.4 CM/SEC
BH CV XLRA MEAS LEFT PROX CCA EDV: 15.1 CM/SEC
BH CV XLRA MEAS LEFT PROX CCA PSV: 140.8 CM/SEC
BH CV XLRA MEAS LEFT PROX ECA EDV: 18.9 CM/SEC
BH CV XLRA MEAS LEFT PROX ECA PSV: 163.4 CM/SEC
BH CV XLRA MEAS LEFT PROX ICA EDV: 17.6 CM/SEC
BH CV XLRA MEAS LEFT PROX ICA PSV: 98.1 CM/SEC
BH CV XLRA MEAS LEFT PROX SCLA PSV: 168.5 CM/SEC
BH CV XLRA MEAS LEFT VERTEBRAL A EDV: 9.6 CM/SEC
BH CV XLRA MEAS LEFT VERTEBRAL A PSV: 69 CM/SEC
BH CV XLRA MEAS RIGHT CCA RATIO VEL: 115 CM/SEC
BH CV XLRA MEAS RIGHT DIST CCA PSV: 115.9 CM/SEC
BH CV XLRA MEAS RIGHT DIST ICA EDV: 15.1 CM/SEC
BH CV XLRA MEAS RIGHT DIST ICA PSV: 105.6 CM/SEC
BH CV XLRA MEAS RIGHT ICA RATIO VEL: 114 CM/SEC
BH CV XLRA MEAS RIGHT ICA/CCA RATIO: 0.99
BH CV XLRA MEAS RIGHT MID CCA PSV: 112.9 CM/SEC
BH CV XLRA MEAS RIGHT MID ICA EDV: 13.8 CM/SEC
BH CV XLRA MEAS RIGHT MID ICA PSV: 110.6 CM/SEC
BH CV XLRA MEAS RIGHT PROX CCA PSV: 125.7 CM/SEC
BH CV XLRA MEAS RIGHT PROX ECA EDV: 10.1 CM/SEC
BH CV XLRA MEAS RIGHT PROX ECA PSV: 265.3 CM/SEC
BH CV XLRA MEAS RIGHT PROX ICA EDV: 10.1 CM/SEC
BH CV XLRA MEAS RIGHT PROX ICA PSV: 115.7 CM/SEC
BH CV XLRA MEAS RIGHT PROX SCLA PSV: 179.7 CM/SEC
CHOLEST SERPL-MCNC: 172 MG/DL (ref 0–200)
DEPRECATED RDW RBC AUTO: 44.9 FL (ref 37–54)
ERYTHROCYTE [DISTWIDTH] IN BLOOD BY AUTOMATED COUNT: 14.3 % (ref 11.3–14.5)
GLUCOSE BLDC GLUCOMTR-MCNC: 186 MG/DL (ref 70–130)
GLUCOSE BLDC GLUCOMTR-MCNC: 204 MG/DL (ref 70–130)
GLUCOSE BLDC GLUCOMTR-MCNC: 214 MG/DL (ref 70–130)
GLUCOSE BLDC GLUCOMTR-MCNC: 233 MG/DL (ref 70–130)
HBA1C MFR BLD: 7 % (ref 4.8–5.6)
HCT VFR BLD AUTO: 36.3 % (ref 34.5–44)
HDLC SERPL-MCNC: 54 MG/DL (ref 40–60)
HGB BLD-MCNC: 12.2 G/DL (ref 11.5–15.5)
MCH RBC QN AUTO: 29 PG (ref 27–31)
MCHC RBC AUTO-ENTMCNC: 33.6 G/DL (ref 32–36)
MCV RBC AUTO: 86.4 FL (ref 80–99)
PLATELET # BLD AUTO: 246 10*3/MM3 (ref 150–450)
PMV BLD AUTO: 10.4 FL (ref 6–12)
RBC # BLD AUTO: 4.2 10*6/MM3 (ref 3.89–5.14)
TRIGL SERPL-MCNC: 68 MG/DL (ref 0–150)
WBC NRBC COR # BLD: 5.37 10*3/MM3 (ref 3.5–10.8)

## 2017-12-10 PROCEDURE — 70450 CT HEAD/BRAIN W/O DYE: CPT

## 2017-12-10 PROCEDURE — 92610 EVALUATE SWALLOWING FUNCTION: CPT

## 2017-12-10 PROCEDURE — 93880 EXTRACRANIAL BILAT STUDY: CPT

## 2017-12-10 PROCEDURE — 92523 SPEECH SOUND LANG COMPREHEN: CPT

## 2017-12-10 PROCEDURE — 63710000001 INSULIN REGULAR HUMAN PER 5 UNITS: Performed by: INTERNAL MEDICINE

## 2017-12-10 PROCEDURE — 94799 UNLISTED PULMONARY SVC/PX: CPT

## 2017-12-10 PROCEDURE — 85027 COMPLETE CBC AUTOMATED: CPT | Performed by: INTERNAL MEDICINE

## 2017-12-10 PROCEDURE — 99233 SBSQ HOSP IP/OBS HIGH 50: CPT | Performed by: INTERNAL MEDICINE

## 2017-12-10 PROCEDURE — 93880 EXTRACRANIAL BILAT STUDY: CPT | Performed by: INTERNAL MEDICINE

## 2017-12-10 PROCEDURE — 82962 GLUCOSE BLOOD TEST: CPT

## 2017-12-10 PROCEDURE — 97163 PT EVAL HIGH COMPLEX 45 MIN: CPT

## 2017-12-10 PROCEDURE — 97166 OT EVAL MOD COMPLEX 45 MIN: CPT

## 2017-12-10 PROCEDURE — 80061 LIPID PANEL: CPT | Performed by: INTERNAL MEDICINE

## 2017-12-10 PROCEDURE — 83036 HEMOGLOBIN GLYCOSYLATED A1C: CPT | Performed by: INTERNAL MEDICINE

## 2017-12-10 RX ADMIN — INSULIN HUMAN 3 UNITS: 100 INJECTION, SOLUTION PARENTERAL at 06:11

## 2017-12-10 RX ADMIN — FAMOTIDINE 20 MG: 10 INJECTION, SOLUTION INTRAVENOUS at 21:34

## 2017-12-10 RX ADMIN — NICARDIPINE HYDROCHLORIDE 3 MG/HR: 0.1 INJECTION, SOLUTION INTRAVENOUS at 19:14

## 2017-12-10 RX ADMIN — NICARDIPINE HYDROCHLORIDE 10 MG/HR: 0.1 INJECTION, SOLUTION INTRAVENOUS at 01:03

## 2017-12-10 RX ADMIN — FAMOTIDINE 20 MG: 10 INJECTION, SOLUTION INTRAVENOUS at 08:20

## 2017-12-10 RX ADMIN — INSULIN HUMAN 3 UNITS: 100 INJECTION, SOLUTION PARENTERAL at 12:14

## 2017-12-10 RX ADMIN — NICARDIPINE HYDROCHLORIDE 5 MG/HR: 0.1 INJECTION, SOLUTION INTRAVENOUS at 08:19

## 2017-12-10 RX ADMIN — SODIUM CHLORIDE 75 ML/HR: 9 INJECTION, SOLUTION INTRAVENOUS at 19:13

## 2017-12-10 RX ADMIN — INSULIN HUMAN 3 UNITS: 100 INJECTION, SOLUTION PARENTERAL at 00:07

## 2017-12-10 RX ADMIN — INSULIN HUMAN 2 UNITS: 100 INJECTION, SOLUTION PARENTERAL at 17:35

## 2017-12-10 RX ADMIN — SODIUM CHLORIDE 75 ML/HR: 9 INJECTION, SOLUTION INTRAVENOUS at 05:31

## 2017-12-10 RX ADMIN — NICARDIPINE HYDROCHLORIDE 5 MG/HR: 0.1 INJECTION, SOLUTION INTRAVENOUS at 03:01

## 2017-12-10 NOTE — PROGRESS NOTES
"Critical Care Note     LOS: 1 day   Patient Care Team:  No Known Provider as PCP - General    Chief Complaint/Reason for visit:    Chief Complaint   Patient presents with   • Stroke       Subjective     77-year-old woman presenting with sided weakness, right frontal lobe hemorrhage with edema. She had a preceding episode of transient facial weakness earlier this week.  CT scan revealed a 3.7 x 3.8 cm frontal hemorrhage with some right to left shift and a smaller posterior and superior hemorrhage.    Interval History:     Today her NIH stroke scale is 11, decreased from a 15. She did walk with physical therapy. She failed her speech swallow evaluation.    Review of Systems:    All systems were reviewed and negative except as noted in subjective.    Medical history, surgical history, social history, family history reviewed    Objective     Intake/Output:    Intake/Output Summary (Last 24 hours) at 12/10/17 1429  Last data filed at 12/10/17 0600   Gross per 24 hour   Intake           2009.3 ml   Output              250 ml   Net           1759.3 ml       Nutrition:  NPO Diet    Infusions:    niCARdipine 5-15 mg/hr Last Rate: 5 mg/hr (12/10/17 0819)   phenylephrine 0.5-3 mcg/kg/min    sodium chloride 75 mL/hr Last Rate: 75 mL/hr (12/10/17 0531)         Telemetry:  Sinus Rhythm: sinus tachycardia          Vital Signs  Blood pressure 129/58, pulse 105, temperature 98.5 °F (36.9 °C), temperature source Oral, resp. rate 18, height 157.5 cm (62\"), weight 68 kg (150 lb), SpO2 94 %.    Physical Exam:  General Appearance:  Elderly white woman, sleeping in the chair    Head:  Normocephalic, atraumatic    Eyes:             Ears:     Throat:    Neck: No carotid bruit    Back:      Lungs:   Breath sounds are bilateral, equal, clear     Heart:  Regular, S1, S2, frequent early beats, SERGEI   Abdomen:   Soft, nondistended    Rectal:   Deferred   Extremities: No pitting edema    Pulses:    Skin:    Lymph nodes:    Neurologic: Sleeping in " a chair, facial droop present, left       Results Review:     I reviewed the patient's new clinical results.     Results from last 7 days  Lab Units 12/09/17  1539   SODIUM mmol/L 135   POTASSIUM mmol/L 3.7   CHLORIDE mmol/L 102   CO2 mmol/L 27.0   BUN mg/dL 11   CREATININE mg/dL 1.00   CALCIUM mg/dL 8.9   BILIRUBIN mg/dL 0.6   ALK PHOS U/L 75   ALT (SGPT) U/L 7   AST (SGOT) U/L 12   GLUCOSE mg/dL 166*       Results from last 7 days  Lab Units 12/10/17  0542 12/09/17  1539   WBC 10*3/mm3 5.37 7.24   HEMOGLOBIN g/dL 12.2 11.9   HEMATOCRIT % 36.3 36.6   PLATELETS 10*3/mm3 246 224         No results found for: BLOODCX  No results found for: URINECX    I reviewed the patient's new imaging including images and reports.  FINDINGS:   1. There is a large intra-axial right temporal frontal hemorrhage  surrounded by significant edema and local mass effect.      A small abnormal area of increased attenuation is seen rightward of the  posterior falx. There is subarachnoid bleed over the convexity.      2. New hemorrhage, increasing mass effect, or evidence of high-grade  extracerebral collection is not identified.      3. The local edema around the right intra-axial dominant clot is stable.      IMPRESSION:  No interval change is noted in the intra-axial and  extracerebral bleed complex described above. There is no evidence of  progressive hemorrhage, progressive edema or progressive mass effect.      DICTATED:     12/10/2017  Interpretation Summary      · Left ventricular systolic function is normal. Estimated EF = 70%.  · Normal right ventricular cavity size, wall thickness, systolic function and septal motion noted.  · Saline test results are negative.  · The aortic valve exhibits sclerosis.  · No evidence of pulmonary hypertension is present.  · There is no evidence of pericardial effusion.       All medications reviewed.     famotidine 20 mg Intravenous Q12H   insulin regular 0-7 Units Subcutaneous Q6H   [START ON  12/11/2017] Pharmacy Meds to Bed Consult  Does not apply Daily         Assessment/Plan     Principal Problem:    Intracranial hemorrhage  Active Problems:    Essential hypertension  Diabetes Mellitus    77-year-old woman presenting with left facial droop and left-sided weakness. She has left-sided neglect and eyes are deviated to the right. She failed her swallow evaluation today. She was able to walk with physical therapy. She had a substantial area of hemorrhage in the right frontal region and smaller areas in the occiput and along the posterior falx.  Repeat CT scan today was unchanged but still impressive.  She said several irregular heartbeats but remains in sinus rhythm with PACs. She remains on low-dose Cardene for blood pressure management.  Blood sugars have been in the 200s today, likely from the 1 dose of Decadron.      PLAN:  If she fails her fees tomorrow will place a nasogastric tube and initiate feedings, oral blood pressure medication    Occupational therapy, physical therapy, speech therapy    Monitor stroke scale, if decline repeat CT scan    Increase insulin        VTE Prophylaxis:SCDS    Stress Ulcer Prophylaxis:kendrick Espitia MD  12/10/17  2:29 PM      Time: 25min  I personally provided care to this critically ill patient as documented above.  Critical care time does not include time spent on separately billed procedures.  Non of my critical care time was concurrent with other critical care providers.

## 2017-12-10 NOTE — THERAPY EVALUATION
Acute Care - Speech Language Pathology Initial Evaluation  [facility]     Patient Name: Lois Brennan  : 1940  MRN: [id number]  Today's Date: 12/10/2017  Onset of Illness/Injury or Date of Surgery Date: 17            Admit Date: 2017     Visit Dx:    ICD-10-CM ICD-9-CM   1. Intracranial hematoma, right, without loss of consciousness, initial encounter S06.340A 853.01   2. Impaired mobility and ADLs Z74.09 799.89   3. Impaired functional mobility, balance, gait, and endurance Z74.09 V49.89     Patient Active Problem List   Diagnosis   • Intracranial hemorrhage   • Essential hypertension     Past Medical History:   Diagnosis Date   • Dementia    • Diabetes mellitus    • GERD (gastroesophageal reflux disease)    • Hypertension    • Rheumatoid arthritis    • TIA (transient ischemic attack)      Past Surgical History:   Procedure Laterality Date   • CATARACT EXTRACTION, BILATERAL     • EYE SURGERY            SLP EVALUATION (last 72 hours)      SLP Evaluation       12/10/17 1040                Clinical Impression    Patient's Goals For Discharge patient did not state  -        Therapy Frequency 5 times/wk  -LS        Cognitive Assessment/Intervention    Additional Documentation Cognitive Assessment Intervention (Group)  -        Cognitive Assessment Intervention    Behavior/Mood Observations alert;cooperative  -        Problem Solving moderate impairment  -        Communication Assessment/Intervention    Additional Documentation Auditory Comprehen Assess/Intervention (Group);Verbal Expression Assess/Intervention (Group);Motor Speech Assessment/Intervention (Group)  -LS        Auditory Comprehen Assess/Intervention    Answers Yes/No Questions successful, personal questions  -LS        Able to Follow Commands success, 1 step commands  -        Verbal Expression Assess/Intervention    Automatic Speech successful, counting  -        Motor Speech Assess/Intervention    Motor Speech-Dysarthria  Observations slurred speech;slow rate  -LS        Communication Treatment Objective and Progress    Dysarthria Treatment Objectives Improve prosody  -LS        Improve articulation    Improve articulation: by over-articulating at word level;by over-articulating at phrase level  -LS        Status: Improve articulation New  -LS        Articulation Progress continue to address  -LS          User Key  (r) = Recorded By, (t) = Taken By, (c) = Cosigned By    Initials Name Effective Dates    NASRIN Conklin MS CCC-SLP 06/22/15 -            EDUCATION  The patient has been educated in the following areas:   Cognitive Impairment Communication Impairment.    SLP Recommendation and Plan                    Therapy Frequency: 5 times/wk          Plan of Care Review  Plan Of Care Reviewed With: patient  Progress: progress towards functional goals is fair  Outcome Summary/Follow up Plan: Clinicical swallow eval complete. R/O pharyngeal dysphagia. Oral phase deficits c/b L facial/labial droop  and oral residue on the L w/ pureed. S/S of asp included overt coughing w/ tsp of thins, multiple swallows with pureed and nectar thick. SLP unable to rec safe diet at the bedside. REC: NPO w/ meds via alt route, FEES to be completed 12/11.  Communication eval initiated. Pt answered questions reslated to self. Mild- moderate dysarhtria observed  impacted by L facial droop. Pt with L neglect and difficulty w/ problem solving related to self.  Pt answered  oriented x3, answered simple questiosn related to self. Dementia present at baseline per charting. REC: continue dxtx for cog/communication.            Time Calculation:         Time Calculation- SLP       12/10/17 1314          Time Calculation- SLP    SLP Start Time 1040  -      SLP Received On 12/10/17  -        User Key  (r) = Recorded By, (t) = Taken By, (c) = Cosigned By    Initials Name Provider Type    NASRIN Conklin MS CCC-SLP Speech and Language Pathologist           Therapy Charges for Today     Code Description Service Date Service Provider Modifiers Qty    45838257755 HC ST EVAL ORAL PHARYNG SWALLOW 3 12/10/2017 Sharri Conklin, MS CCC-SLP GN 1    96925065091 HC ST EVAL SPEECH AND PROD W LANG  1 12/10/2017 Sharri Conklin, MS CCC-SLP GN 1                     Sharri Conklin, MS CCC-SLP  12/10/2017 and Acute Care - Speech Language Pathology   Swallow Initial Evaluation  [place]     Patient Name: Lois Brennan  : 1940  MRN: [id number]  Today's Date: 12/10/2017  Onset of Illness/Injury or Date of Surgery Date: 17            Admit Date: 2017    Visit Dx:     ICD-10-CM ICD-9-CM   1. Intracranial hematoma, right, without loss of consciousness, initial encounter S06.340A 853.01   2. Impaired mobility and ADLs Z74.09 799.89   3. Impaired functional mobility, balance, gait, and endurance Z74.09 V49.89     Patient Active Problem List   Diagnosis   • Intracranial hemorrhage   • Essential hypertension     Past Medical History:   Diagnosis Date   • Dementia    • Diabetes mellitus    • GERD (gastroesophageal reflux disease)    • Hypertension    • Rheumatoid arthritis    • TIA (transient ischemic attack)      Past Surgical History:   Procedure Laterality Date   • CATARACT EXTRACTION, BILATERAL     • EYE SURGERY            SWALLOW EVALUATION (last 72 hours)      Swallow Evaluation       12/10/17 1040                Rehab Evaluation    Document Type evaluation  -LS        Subjective Information no complaints;agree to therapy  -LS        General Information    Patient Profile Review yes  -LS        Subjective Patient Observations alert and oriented, cooperative  -LS        Pertinent History Of Current Problem admit w/ ICH in R fronatalparital lobe, mild dementia at baseline per RN  -LS        Current Diet Limitations NPO  -LS        Prior Level of Function- Communication other (comment)   mild dementia  -LS        Prior Level of Function- Swallowing no diet  consistency restrictions  -        Plans/Goals Discussed With patient  -        Barriers to Rehab medically complex  -        Clinical Impression    Criteria for Skilled Therapeutic Interventions Met skilled criteria for dysphagia intervention met  -LS        FCM, Swallowing 1-->Level 1  -        Therapy Frequency PRN  -        SLP Diet Recommendation NPO: unsafe for food/liquid intake  -        Recommended Diagnostics FEES  -        SLP Rec. for Method of Medication Administration meds via alternate route  -        Pain Assessment    Pain Assessment No/denies pain  -        Oral Motor Structure and Function    Oral Musculature General Assessment oral labial impairment  -        Oral Motor Assessment Comment L facial and labial droop  -        Clinical Swallow Exam    Mode of Presentation fed by clinician;cup;spoon  -        Oral Phase Results impaired oral phase, signs of dysfunction present  -        Pharyngeal Phase Results sign/symptoms of pharyngeal impairment  -        Summary of Clinical Exam R/O pharyngeal dysphagia. Oral phase deficits c/b L facial/labial droop  and oral residue on the L w/ pureed. S/S of asp included overt coughing w/ tsp of thins, multiple swallows with pureed and nectar thick. SLP unable to rec safe diet at the bedside. REC: NPO w/ meds via alt route, FEES to be completed 12/11.  -          User Key  (r) = Recorded By, (t) = Taken By, (c) = Cosigned By    Initials Name Effective Dates     Sharri Conklin MS [facility]-SLP 06/22/15 -         EDUCATION  The patient has been educated in the following areas:   Dysphagia (Swallowing Impairment) Oral Care/Hydration.    SLP Recommendation and Plan     SLP Diet Recommendation: NPO: unsafe for food/liquid intake     SLP Rec. for Method of Medication Administration: meds via alternate route     Recommended Diagnostics: FEES  Criteria for Skilled Therapeutic Interventions Met: skilled criteria for dysphagia intervention  met        Therapy Frequency: PRN             Plan of Care Review  Plan Of Care Reviewed With: patient  Progress: progress towards functional goals is fair  Outcome Summary/Follow up Plan: Clinicical swallow eval complete. R/O pharyngeal dysphagia. Oral phase deficits c/b L facial/labial droop  and oral residue on the L w/ pureed. S/S of asp included overt coughing w/ tsp of thins, multiple swallows with pureed and nectar thick. SLP unable to rec safe diet at the bedside. REC: NPO w/ meds via alt route, FEES to be completed 12/11.  Communication eval initiated. Pt answered questions reslated to self. Mild- moderate dysarhtria observed  impacted by L facial droop. Pt with L neglect and difficulty w/ problem solving related to self.  Pt answered  oriented x3, answered simple questiosn related to self. Dementia present at baseline per charting. REC: continue dxtx for cog/communication.             Time Calculation:         Time Calculation- SLP       12/10/17 1314          Time Calculation- SLP    SLP Start Time 1040  -      SLP Received On 12/10/17  -        User Key  (r) = Recorded By, (t) = Taken By, (c) = Cosigned By    Initials Name Provider Type     Sharri Conklin, MS CCC-SLP Speech and Language Pathologist          Therapy Charges for Today     Code Description Service Date Service Provider Modifiers Qty    03045318743 HC ST EVAL ORAL PHARYNG SWALLOW 3 12/10/2017 Sharri Conklin MS CCC-SLP GN 1    [id number] HC ST EVAL SPEECH AND PROD W LANG  1 12/10/2017 Sharri Conklin MS CCC-SLP GN 1               Sharri Conklin MS CCC-SLP  12/10/2017

## 2017-12-10 NOTE — PLAN OF CARE
Problem: Fall Risk (Adult)  Intervention: Monitor/Assist with Self Care    12/10/17 1643   Activity   Activity Type activity adjusted per tolerance;up in chair   Musculoskeletal Interventions   Self-Care Promotion independence encouraged   Monitor/Assist with Self Care   Ambulation 4-->completely dependent   Transferring 4-->completely dependent   Toileting 3-->assistive equipment and person   Bathing 2-->assistive person   Dressing 2-->assistive person   Eating 2-->assistive person   Communication 2-->difficulty speaking (not related to language barrier)   Swallowing (if score 2 or more for any item, consult Rehab Services) 2-->difficulty swallowing liquids/foods       Intervention: Reduce Risk/Promote Restraint Free Environment    12/10/17 1643   Safety Interventions   Safety/Security Measures bed alarm set;chair alarm set   Environmental Safety Modification assistive device/personal items within reach;clutter free environment maintained;lighting adjusted   Restraint Interventions   Safety Promotion/Fall Prevention safety round/check completed;fall prevention program maintained;nonskid shoes/slippers when out of bed;supervised activity;gait belt       Intervention: Review Medications/Identify Contributors to Fall Risk    12/10/17 1643   Safety Interventions   Medication Review/Management medications reviewed         Goal: Identify Related Risk Factors and Signs and Symptoms  Outcome: Outcome(s) achieved Date Met:  12/10/17    12/10/17 1643   Fall Risk   Fall Risk: Related Risk Factors age-related changes;confusion/agitation;fatigue/slow reaction;fear of falling;gait/mobility problems;history of falls;impaired vision;neuro disease/injury;sensory deficits;slipper/uneven surfaces;environment unfamiliar   Fall Risk: Signs and Symptoms presence of risk factors       Goal: Absence of Falls  Outcome: Ongoing (interventions implemented as appropriate)    12/10/17 1643   Fall Risk (Adult)   Absence of Falls making progress  toward outcome

## 2017-12-10 NOTE — PLAN OF CARE
Problem: Patient Care Overview (Adult)  Goal: Plan of Care Review  Outcome: Ongoing (interventions implemented as appropriate)    12/10/17 1307   Coping/Psychosocial Response Interventions   Plan Of Care Reviewed With patient   Patient Care Overview   Progress progress towards functional goals is fair   Outcome Evaluation   Outcome Summary/Follow up Plan clinical swallow eval complete. R/O pharyngeal dysphagia. Oral phase deficits c/b L facial/labial droop and oral residue on the L w/ pureed. S/S of asp included overt coughing w/ tsp of thins, multiple swallows with pureed and nectar thick. SLP unable to rec safe diet at the bedside. REC: NPO w/ meds via alt route, FEES to be completed 12/11.      Communication eval initiated. Pt answered questions related to self. Mild- moderate dysarthria observed impacted by L facial droop. Pt with L neglect and difficulty w/ problem solving related to self. Pt answered oriented x3, answered simple questions related to self. Dementia present at baseline per charting. REC: continue dxtx for cog/communication.

## 2017-12-10 NOTE — PLAN OF CARE
Problem: Patient Care Overview (Adult)  Goal: Plan of Care Review  Outcome: Ongoing (interventions implemented as appropriate)    12/10/17 1026   Coping/Psychosocial Response Interventions   Plan Of Care Reviewed With patient   Outcome Evaluation   Outcome Summary/Follow up Plan Pt presents with increased LUE tone/absent sensation; decreased tracking to L, L neglect, deficits in balance, ADL performance, fxl mobility, occupational endurance; Max A x 2 bed mobility, Mod A x 2 to ambulate 15 with BUE support; pt had increased HR to 160 with ambulation, RN notified. Recommend IP rehab at discharge as pt lives alone.         Problem: Inpatient Occupational Therapy  Goal: Transfer Training Goal 1 LTG- OT  Outcome: Ongoing (interventions implemented as appropriate)    12/10/17 1026   Transfer Training OT LTG   Transfer Training OT LTG, Date Established 12/10/17   Transfer Training OT LTG, Time to Achieve 1 wk   Transfer Training OT LTG, Activity Type bed to chair /chair to bed;sit to stand/stand to sit;toilet   Transfer Training OT LTG, [place] Level minimum assist (75% patient effort);2 person assist required   Transfer Training OT LTG, Assist Device (AAD)   Transfer Training OT LTG, Outcome goal ongoing       Goal: Strength Goal LTG- OT  Outcome: Ongoing (interventions implemented as appropriate)    12/10/17 1026   Strength OT LTG   Strength Goal OT LTG, Date Established 12/10/17   Strength Goal OT LTG, Time to Achieve 1 wk   Strength Goal OT LTG, Measure to Achieve (Increase LUE MMS by 1/2 MMG to support fxl I)   Strength Goal OT LTG, Outcome goal ongoing       Goal: Grooming Goal LTG- OT  Outcome: Ongoing (interventions implemented as appropriate)    12/10/17 1026   Grooming OT LTG   Grooming Goal OT LTG, Date Established 12/10/17   Grooming Goal OT LTG, Time to Achieve 1 wk   Grooming Goal OT LTG, [place] Level minimum assist (75% patient effort);verbal cues required;nonverbal cues required (demo/gesture)    Grooming Goal OT LTG, Position sitting in chair   Grooming Goal OT LTG, Outcome goal ongoing       Goal: Toileting Goal LTG- OT  Outcome: Ongoing (interventions implemented as appropriate)    12/10/17 1026   Toileting OT LTG   Toileting Goal OT LTG, Date Established 12/10/17   Toileting Goal OT LTG, Time to Achieve 1 wk   Toileting Goal OT LTG, [place] Level maximum assist (25% patient effort)   Toileting Goal OT LTG, Outcome goal ongoing

## 2017-12-10 NOTE — PROGRESS NOTES
"NEUROSURGERY PROGRESS NOTE     LOS: 1 day   Patient Care Team:  No Known Provider as PCP - General    Chief Complaint: Left-sided weakness.    Interval History:   Patient Complaints: Mild headache.  Patient Denies: Nausea or vomiting.    Review of Systems:   Musculoskeletal and Neurological systems were reviewed and are negative except for:  Neurological: positive for headaches and weakness    Vital Signs: Blood pressure 105/51, pulse 101, temperature 98.9 °F (37.2 °C), temperature source Oral, resp. rate 14, height 157.5 cm (62\"), weight 68 kg (150 lb), SpO2 95 %.  Intake/Output:   Intake/Output Summary (Last 24 hours) at 12/10/17 0822  Last data filed at 12/10/17 0600   Gross per 24 hour   Intake           2009.3 ml   Output              250 ml   Net           1759.3 ml       Physical Exam:  The patient is awake, alert, and sitting up in bed.  She continues to have right eye deviation.  There is no neglect of her left side.  She harbors a left hemiparesis involving the face, arm, and leg.  Extremity strength on the left is at least 3/5.     Data Review:    CT scan of the brain from this morning is unchanged from the study on the prior day.  There is very modest right to left shift.  There is significant diffuse atrophy.    Assessment/Plan:  1.  Multiple areas of intracranial hemorrhage with the largest being a right frontal ICH.  Multiple areas of hemorrhage potentially do to hemorrhagic conversion of embolic infarcts.  Carotid duplex is pending.  Cardiac echo demonstrated atrial fibrillation.  2.  Atrial fibrillation.  3.  Hypertension.  3.  Disposition: Continue supportive care and observation.      Leo Proctor MD  12/10/17  8:22 AM      "

## 2017-12-10 NOTE — THERAPY EVALUATION
Acute Care - Occupational Therapy Initial Evaluation  [facility]     Patient Name: Lois Brennan  : 1940  MRN: [id number]  Today's Date: 12/10/2017  Onset of Illness/Injury or Date of Surgery Date: 17  Date of Referral to OT: 17  Referring Physician: Dr. Espitia    Admit Date: 2017       ICD-10-CM ICD-9-CM   1. Intracranial hematoma, right, without loss of consciousness, initial encounter S06.340A 853.01   2. Impaired mobility and ADLs Z74.09 799.89   3. Impaired functional mobility, balance, gait, and endurance Z74.09 V49.89     Patient Active Problem List   Diagnosis   • Intracranial hemorrhage   • Essential hypertension     Past Medical History:   Diagnosis Date   • Dementia    • Diabetes mellitus    • GERD (gastroesophageal reflux disease)    • Hypertension    • Rheumatoid arthritis    • TIA (transient ischemic attack)      Past Surgical History:   Procedure Laterality Date   • CATARACT EXTRACTION, BILATERAL     • EYE SURGERY            OT ASSESSMENT FLOWSHEET (last 72 hours)      OT Evaluation       12/10/17 0931 12/10/17 0930 12/09/17 2022          Rehab Evaluation    Document Type evaluation  -SJ evaluation  -TA       Subjective Information no complaints;agree to therapy  -SJ agree to therapy;no complaints  -TA       Patient Effort, Rehab Treatment good  -SJ good  -TA       Symptoms Noted During/After Treatment none  -SJ significant change in vital signs;other (see comments)   Increased HR to 160; RN notified  -TA       General Information    Patient Profile Review yes  -SJ yes  -TA       Onset of Illness/Injury or Date of Surgery Date 17  -SJ 17  -TA       Referring Physician Dr. Espitia  -SJ Dr Espitia  -TA       General Observations Pt in ICU, supine in bed,   -SJ Pt supine in bed, RA, tele, IV; no visitors present in room.  -TA       Pertinent History Of Current Problem 77 y.o.F found at home with L-sided weakness, CT (+) R frontal lobe hemorrhage  -SJ  Pt to ED with L sided weakness, dysarthria; PMH/o dementia, RA, TIAs  -TA       Precautions/Limitations fall precautions  -SJ fall precautions;insensate limb;other (see comments)   BRAINLISY insensate  -TA       Prior Level of Function independent:;all household mobility;community mobility;gait;transfer;bed mobility;ADL's  -SJ independent:;all household mobility;gait;transfer;bed mobility;ADL's;cooking   Questionable historian 2/2 PMH/o dementia  -TA       Equipment Currently Used at Home none  -SJ none  -TA none  -JE      Plans/Goals Discussed With patient;agreed upon  -SJ patient;agreed upon  -TA       Risks Reviewed patient:;LOB;increased discomfort;lines disloged  -SJ patient:;LOB;dizziness;change in vital signs;increased discomfort;lines disloged  -TA       Benefits Reviewed patient:;improve function;increase independence;increase strength;increase balance;increase knowledge  -SJ patient:;improve function;increase independence;increase strength;increase balance;decrease pain;increase knowledge  -TA       Barriers to Rehab none identified  -SJ medically complex;previous functional deficit  -TA       Living Environment    Lives With alone  -SJ alone  -TA alone  -JE      Living Arrangements house  -SJ house  -TA house  -JE      Home Accessibility  no concerns  -TA no concerns  -JE      Stair Railings at Home   none  -JE      Type of Financial/Environmental Concern   none  -JE      Transportation Available   car;family or friend will provide  -JE      Living Environment Comment Pt has family close by  -SJ        Clinical Impression    Date of Referral to OT  12/09/17  -TA       OT Diagnosis  Impaired mobility and ADLs  -TA       Impairments Found (describe specific impairments)  aerobic capacity/endurance;arousal, attention, and cognition;gait, locomotion, and balance;motor function;muscle performance  -TA       Patient/Family Goals Statement  none stated  -TA       Criteria for Skilled Therapeutic Interventions Met   yes;treatment indicated  -TA       Rehab Potential  good, to achieve stated therapy goals  -TA       Therapy Frequency  daily   Per priority policy  -TA       Anticipated Discharge Disposition  inpatient rehabilitation facility  -TA       Functional Level Prior    Ambulation   0-->independent  -JE      Transferring   0-->independent  -JE      Toileting   0-->independent  -JE      Bathing   0-->independent  -JE      Dressing   0-->independent  -JE      Eating   0-->independent  -JE      Communication   0-->understands/communicates without difficulty  -JE      Swallowing   0-->swallows foods/liquids without difficulty  -JE      Prior Functional Level Comment   N/A  -JE      Vital Signs    Pre Systolic BP Rehab 124  -  -TA       Pre Treatment Diastolic BP 57  -SJ 57  -TA       Post Systolic BP Rehab 143  -  -TA       Post Treatment Diastolic BP 80  -SJ 80  -TA       Pretreatment Heart Rate (beats/min) 98  -SJ 77  -TA       Intratreatment Heart Rate (beats/min) 160  -  -TA       Posttreatment Heart Rate (beats/min) 111  -  -TA       Pre SpO2 (%) 97  -SJ 97  -TA       O2 Delivery Pre Treatment room air  -SJ room air  -TA       Post SpO2 (%) 97  -SJ 97  -TA       O2 Delivery Post Treatment room air  -SJ room air  -TA       Pre Patient Position Supine  -SJ Supine  -TA       Intra Patient Position Standing  -SJ Standing  -TA       Post Patient Position Sitting  -SJ Sitting  -TA       Pain Assessment    Pain Assessment No/denies pain  -SJ No/denies pain  -TA       Vision Assessment/Intervention    Visual Impairment decreased tracking across midline;inattention to the left;left neglect;needs cues to attend visually  - visual impairment, right;decreased tracking across midline;decreased convergence;left neglect  -TA       Cognitive Assessment/Intervention    Current Cognitive/Communication Assessment impaired  -SJ impaired  -TA       Orientation Status oriented to;person  -SJ oriented  to;person;situation;disoriented to;place;time  -TA       Follows Commands/Answers Questions 50% of the time;able to follow single-step instructions;needs cueing  -SJ 75% of the time;able to follow single-step instructions;needs cueing;needs increased time;needs repetition  -TA       Personal Safety moderate impairment;decreased awareness, need for assist;decreased awareness, need for safety;decreased insight to deficits  -SJ mild impairment;decreased awareness, need for assist;decreased awareness, need for safety;decreased insight to deficits  -TA       Personal Safety Interventions fall prevention program maintained;gait belt;muscle strengthening facilitated;nonskid shoes/slippers when out of bed  -SJ fall prevention program maintained;gait belt;nonskid shoes/slippers when out of bed;supervised activity  -TA       ROM (Range of Motion)    General ROM no range of motion deficits identified  -SJ no range of motion deficits identified  -TA       General ROM Detail  AROM RUE WFL; AAROM/PROM LUE WFL  -TA       MMT (Manual Muscle Testing)    General MMT Assessment lower extremity strength deficits identified  -SJ upper extremity strength deficits identified  -TA       General MMT Assessment Detail  RUE 4+/5; L shld fl 3/5; L joints distally 1/5  -TA       Muscle Tone Assessment    Muscle Tone Assessment  LUE  -TA       LUE Muscle Tone Assessment  moderately increased tone  -TA       Bed Mobility, Assessment/Treatment    Bed Mobility, Assistive Device head of bed elevated;draw sheet  -SJ head of bed elevated;draw sheet  -TA       Bed Mob, Supine to Sit, [place] maximum assist (25% patient effort);2 person assist required;verbal cues required  -SJ maximum assist (25% patient effort);2 person assist required;verbal cues required  -TA       Bed Mob, Sit to Supine, [place]  not tested  -TA       Bed Mobility, Safety Issues cognitive deficits limit understanding;decreased use of arms for pushing/pulling;decreased use  of legs for bridging/pushing  -SJ decreased use of arms for pushing/pulling;decreased use of legs for bridging/pushing;impaired trunk control for bed mobility  -TA       Bed Mobility, Impairments muscle tone abnormal;strength decreased;impaired balance;coordination impaired  -SJ muscle tone abnormal;sensation decreased;strength decreased;impaired balance;motor control impaired;postural control impaired;impaired vision  -TA       Bed Mobility, Comment cues for sequencing  -SJ Max  A x2 for all bed mobility; VCs for sequencing transitions  -TA       Transfer Assessment/Treatment    Transfers, Bed-Chair [place] moderate assist (50% patient effort);2 person assist required;verbal cues required  -SJ        Transfers, Sit-Stand [place] moderate assist (50% patient effort);2 person assist required;verbal cues required  -SJ moderate assist (50% patient effort);2 person assist required;upper extremity support;verbal cues required  -TA       Transfers, Stand-Sit [place] minimum assist (75% patient effort);2 person assist required;verbal cues required  -SJ moderate assist (50% patient effort);2 person assist required;upper extremity support;verbal cues required  -TA       Transfers, Sit-Stand-Sit, Assist Device  other (see comments)   gait belt, BUE support  -TA       Transfer, Safety Issues balance decreased during turns;sequencing ability decreased;step length decreased  -SJ balance decreased during turns;sequencing ability decreased;step length decreased;weight-shifting ability decreased;loses balance backward  -TA       Transfer, Impairments muscle tone abnormal;strength decreased;impaired balance;coordination impaired;impaired vision  -SJ muscle tone abnormal;sensation decreased;strength decreased;impaired balance;motor control impaired;postural control impaired;impaired vision  -TA       Transfer, Comment stand pivot t/f from EOB to recliner with max cues for sequencing and safety  -SJ Increased tone L  side  -TA       Functional Mobility    Functional Mobility- Ind. Level  moderate assist (50% patient effort);2 person assist required;verbal cues required  -TA       Functional Mobility- Device  other (see comments)   gait belt, BUE support  -TA       Functional Mobility-Distance (Feet)  --   In room; see PT note for distance  -TA       Functional Mobility- Safety Issues  balance decreased during turns;sequencing ability decreased;step length decreased;weight-shifting ability decreased;loses balance backward  -TA       Functional Mobility- Comment  Seqncing and weight shifting assist required.  -TA       Upper Body Dressing Assessment/Training    UB Dressing Assess/Train, Clothing Type  donning:;hospital gown  -TA       UB Dressing Assess/Train, Position  sitting;edge of bed  -TA       UB Dressing Assess/Train, [place]  dependent (less than 25% patient effort)  -TA       UB Dressing Assess/Train, Impairments  muscle tone abnormal;sensation decreased;strength decreased;impaired balance;motor control impaired;postural control impaired;impaired vision  -TA       Motor Skills/Interventions    Additional Documentation Balance Skills Training (Group)  -SJ Balance Skills Training (Group);Fine Motor Coordination Training (Group)  -TA       Balance Skills Training    Sitting-Level of Assistance Minimum assistance  -SJ Minimum assistance  -TA       Sitting-Balance Support Right upper extremity supported;Left upper extremity supported;Feet supported  -SJ Right upper extremity supported;Left upper extremity supported;Feet supported  -TA       Standing-Level of Assistance Minimum assistance;x2  -SJ Minimum assistance;x2  -TA       Static Standing Balance Support Right upper extremity supported;Left upper extremity supported  -SJ Right upper extremity supported;Left upper extremity supported  -TA       Standing-Balance Activities  Weight Shift A-P;Weight Shift R-L  -TA       Gait Balance-Level of Assistance Moderate  assistance;x2  -SJ        Gait Balance Support Right upper extremity supported;Left upper extremity supported  -SJ        Fine Motor Coordination Training    Opposition  Right:;intact;Left:;impaired;other (comment)   L [id number] absent  -TA       Sensory Assessment/Intervention    Light Touch  LUE;RUE  -TA       LUE Light Touch  absent sensation  -TA       RUE Light Touch  WNL  -TA       General Therapy Interventions    Planned Therapy Interventions  activity intolerance;ADL retraining;balance training;bed mobility training;home exercise program;neuromuscular re-education;ROM (Range of Motion);strengthening;transfer training;visual retraining  -TA       Positioning and Restraints    Pre-Treatment Position in bed  -SJ in bed  -TA       Post Treatment Position chair  -SJ chair  -TA       In Chair notified nsg;reclined;call light within reach;encouraged to call for assist;exit alarm on;with other staff;waffle cushion;heels elevated  -SJ notified nsg;reclined;call light within reach;encouraged to call for assist;exit alarm on;with other staff;RUE elevated;LUE elevated;legs elevated  -TA         User Key  (r) = Recorded By, (t) = Taken By, (c) = Cosigned By    Initials Name Effective Dates    SJ Yumiko Arboleda, PT 06/19/15 -     TA Azam Ramos OT 03/14/16 -     AMANDO Landaverde RN 06/16/16 -            Occupational Therapy Education     Title: PT OT SLP Therapies (Active)     Topic: Occupational Therapy (Active)     Point: ADL training (Active)    Description: Instruct learner(s) on proper safety adaptation and remediation techniques during self care or transfers.   Instruct in proper use of assistive devices.    Learning Progress Summary    Learner Readiness Method Response Comment Documented by Status   Patient Acceptance E NR Role of OT; reinforced need for call for assist with OOB activities. TA 12/10/17 1024 Active               Point: Precautions (Active)    Description: Instruct learner(s) on  prescribed precautions during self-care and functional transfers.    Learning Progress Summary    Learner Readiness Method Response Comment Documented by Status   Patient Acceptance E NR Role of OT; reinforced need for call for assist with OOB activities. TA 12/10/17 1024 Active                      User Key     Initials Effective Dates Name Provider Type Discipline    TA 03/14/16 -  Azam Ramos OT Occupational Therapist OT                  OT Recommendation and Plan  Anticipated Discharge Disposition: inpatient rehabilitation facility  Planned Therapy Interventions: activity intolerance, ADL retraining, balance training, bed mobility training, home exercise program, neuromuscular re-education, ROM (Range of Motion), strengthening, transfer training, visual retraining  Therapy Frequency: daily (Per priority policy)  Plan of Care Review  Plan Of Care Reviewed With: patient  Outcome Summary/Follow up Plan: Pt presents with increased LUE tone/absent sensation; decreased tracking to L, L neglect, deficits in balance, ADL performance, fxl mobility, occupational endurance; Max A x 2 bed mobility, Mod A x 2 to ambulate 15 with BUE support; pt had increased HR to 160 with ambulation, RN notified. Recommend IP rehab at discharge as pt lives alone.          OT Goals       12/10/17 1026          Transfer Training OT LTG    Transfer Training OT LTG, Date Established 12/10/17  -TA      Transfer Training OT LTG, Time to Achieve 1 wk  -TA      Transfer Training OT LTG, Activity Type bed to chair /chair to bed;sit to stand/stand to sit;toilet  -TA      Transfer Training OT LTG, [place] Level minimum assist (75% patient effort);2 person assist required  -TA      Transfer Training OT LTG, Assist Device --   AAD  -TA      Transfer Training OT LTG, Outcome goal ongoing  -TA      Strength OT LTG    Strength Goal OT LTG, Date Established 12/10/17  -TA      Strength Goal OT LTG, Time to Achieve 1 wk  -TA      Strength Goal OT  LTG, Measure to Achieve --   Increase LUE MMS by 1/2 MMG to support fxl I  -TA      Strength Goal OT LTG, Outcome goal ongoing  -TA      Grooming OT LTG    Grooming Goal OT LTG, Date Established 12/10/17  -TA      Grooming Goal OT LTG, Time to Achieve 1 wk  -TA      Grooming Goal OT LTG, [place] Level minimum assist (75% patient effort);verbal cues required;nonverbal cues required (demo/gesture)  -TA      Grooming Goal OT LTG, Position sitting in chair  -TA      Grooming Goal OT LTG, Outcome goal ongoing  -TA      Toileting OT LTG    Toileting Goal OT LTG, Date Established 12/10/17  -TA      Toileting Goal OT LTG, Time to Achieve 1 wk  -TA      Toileting Goal OT LTG, [place] Level maximum assist (25% patient effort)  -TA      Toileting Goal OT LTG, Outcome goal ongoing  -TA        User Key  (r) = Recorded By, (t) = Taken By, (c) = Cosigned By    Initials Name Provider Type    BECCA Ramos OT Occupational Therapist                Outcome Measures       12/10/17 0931 12/10/17 0930       How much help from another person do you currently need...    Turning from your back to your side while in flat bed without using bedrails? 2  -SJ      Moving from lying on back to sitting on the side of a flat bed without bedrails? 2  -SJ      Moving to and from a bed to a chair (including a wheelchair)? 2  -SJ      Standing up from a chair using your arms (e.g., wheelchair, bedside chair)? 2  -SJ      Climbing 3-5 steps with a railing? 1  -SJ      To walk in hospital room? 2  -SJ      AM-PAC 6 Clicks Score 11  -SJ      How much help from another is currently needed...    Putting on and taking off regular lower body clothing?  1  -TA     Bathing (including washing, rinsing, and drying)  2  -TA     Toileting (which includes using toilet bed pan or urinal)  1  -TA     Putting on and taking off regular upper body clothing  1  -TA     Eating meals  1  -TA     Modified Richardson Scale    Modified [place] Scale 4 -  Moderately severe disability.  Unable to walk without assistance, and unable to attend to own bodily needs without assistance.  -SJ 4 - Moderately severe disability.  Unable to walk without assistance, and unable to attend to own bodily needs without assistance.  -TA     Functional Assessment    Outcome Measure Options AM-PAC 6 Clicks Basic Mobility (PT);Modified [place]  -SJ AM-PAC 6 Clicks Daily Activity (OT);Modified [place]  -TA       User Key  (r) = Recorded By, (t) = Taken By, (c) = Cosigned By    Initials Name Provider Type    QUE Arboleda, PT Physical Therapist    TA Azam Ramos, OT Occupational Therapist          Time Calculation:   OT Start Time: 0930 (ttc 0 minutes)    Therapy Charges for Today     Code Description Service Date Service Provider Modifiers Qty    42073141494 HC OT EVAL MOD COMPLEXITY 4 12/10/2017 Azam Ramos OT GO 1               Azam Ramos OT  12/10/2017

## 2017-12-10 NOTE — PROGRESS NOTES
Adult Nutrition  Assessment/PES    Patient Name:  Lois Brennan  YOB: 1940  MRN: [id number]  Admit Date:  12/9/2017    Assessment Date:  12/10/2017    Comments:  RN to get weight; pt's report re intake and weight do not meet criteria for  nutrition risk.          Reason for Assessment       12/10/17 1147    Reason for Assessment    Reason For Assessment/Visit identified at risk by screening criteria    Identified At Risk By Screening Criteria unintentional loss of 10 lbs or more in the past 2 mos    Time Spent (min) 30    Diagnosis Diagnosis    Cardiac HTN    Endocrine DM Type 2;Hyperglycemia    Immune Rheumatoid arthritis    Neurological Dementia;ICH              Nutrition/Diet History       12/10/17 1150    Nutrition/Diet History    Reported/Observed By RN    Appetite Good    Other Pt reports she was eating well before adm., has good appetie; her last weight was 151 lbs. She doesn't believe sha has had any recent weight changes.              Labs/Tests/Procedures/Meds       12/10/17 1156    Labs/Tests/Procedures/Meds    Procedure Review SLP    Swallow eval status Pending                Nutrition Prescription Ordered       12/10/17 1156    Nutrition Prescription PO    Current PO Diet NPO              Problem/Interventions:                    Nutrition Intervention       12/10/17 1156    Nutrition Intervention    RD/Tech Action Follow Tx progress;Care plan reviewd              Education/Evaluation       12/10/17 1156    Monitor/Evaluation    Monitor Per protocol        Electronically signed by:  Heather Kerns RD  12/10/17 11:56 AM

## 2017-12-10 NOTE — PLAN OF CARE
Problem: Patient Care Overview (Adult)  Goal: Plan of Care Review  Outcome: Ongoing (interventions implemented as appropriate)    12/10/17 1021   Coping/Psychosocial Response Interventions   Plan Of Care Reviewed With patient   Patient Care Overview   Progress progress toward functional goals as expected   Outcome Evaluation   Outcome Summary/Follow up Plan PT patsy completed. Pt able to ambulate 15ft with modA x2, limited by impaired muscle tone, weakness, L-neglect, and tachycardia with activity (HR ~160bpm). Pt motivated to improve functional status and is a good therapy candidate. PT recommends d/c to acute rehab.         Problem: Stroke (Hemorrhagic) (Adult)  Goal: Signs and Symptoms of Listed Potential Problems Will be Absent or Manageable (Stroke)  Outcome: Ongoing (interventions implemented as appropriate)    12/10/17 1021   Stroke (Hemorrhagic)   Problems Assessed (Stroke (Hemorrhagic)) muscle tone abnormal;cognitive impairment;motor/sensory impairment   Problems Present (Stroke (Hemorrhagic)) muscle tone abnormal;cognitive impairment;motor/sensory impairment         Problem: Inpatient Physical Therapy  Goal: Transfer Training Goal 1 LTG- PT  Outcome: Ongoing (interventions implemented as appropriate)    12/10/17 1021   Transfer Training PT LTG   Transfer Training PT LTG, Date Established 12/10/17   Transfer Training PT LTG, Time to Achieve 2 wks   Transfer Training PT LTG, Activity Type bed to chair /chair to bed;sit to stand/stand to sit   Transfer Training PT LTG, [place] Level contact guard assist   Transfer Training PT LTG, Outcome goal ongoing       Goal: Gait Training Goal LTG- PT  Outcome: Ongoing (interventions implemented as appropriate)    12/10/17 1021   Gait Training PT LTG   Gait Training Goal PT LTG, Date Established 12/10/17   Gait Training Goal PT LTG, Time to Achieve 2 wks   Gait Training Goal PT LTG, [place] Level minimum assist (75% patient effort);2 person assist required    Gait Training Goal PT LTG, Distance to Achieve 100   Gait Training Goal PT LTG, Outcome goal ongoing       Goal: Static Sitting Balance Goal LTG- PT  Outcome: Ongoing (interventions implemented as appropriate)    12/10/17 1021   Static Sitting Balance PT LTG   Static Sitting Balance PT LTG, Date Established 12/10/17   Static Sitting Balance PT LTG, Time to Achieve 2 wks   Static Sitting Balance PT LTG, [place] Level supervision required   Static Sitting Balance PT LTG, Assist Device UE Support   Static Sitting Balance PT LTG, Outcome goal ongoing

## 2017-12-10 NOTE — PLAN OF CARE
Problem: Patient Care Overview (Adult)  Goal: Plan of Care Review  Outcome: Ongoing (interventions implemented as appropriate)    12/10/17 0124   Coping/Psychosocial Response Interventions   Plan Of Care Reviewed With patient;family   Patient Care Overview   Progress progress toward functional goals as expected         Problem: Fall Risk (Adult)  Goal: Identify Related Risk Factors and Signs and Symptoms  Outcome: Ongoing (interventions implemented as appropriate)    12/10/17 0124   Fall Risk   Fall Risk: Related Risk Factors fatigue/slow reaction;history of falls;impaired vision;neuro disease/injury;sensory deficits;environment unfamiliar   Fall Risk: Signs and Symptoms presence of risk factors       Goal: Absence of Falls  Outcome: Ongoing (interventions implemented as appropriate)    12/10/17 0124   Fall Risk (Adult)   Absence of Falls making progress toward outcome         Problem: Skin Integrity Impairment, Risk/Actual (Adult)  Goal: Identify Related Risk Factors and Signs and Symptoms  Outcome: Ongoing (interventions implemented as appropriate)    12/10/17 0124   Skin Integrity Impairment, Risk/Actual   Skin Integrity Impairment, Risk/Actual: Related Risk Factors age extremes;immobility;sensory impairment       Goal: Skin Integrity/Wound Healing  Outcome: Ongoing (interventions implemented as appropriate)    12/10/17 0124   Skin Integrity Impairment, Risk/Actual (Adult)   Skin Integrity/Wound Healing making progress toward outcome         Problem: Stroke (Hemorrhagic) (Adult)  Goal: Signs and Symptoms of Listed Potential Problems Will be Absent or Manageable (Stroke)  Outcome: Ongoing (interventions implemented as appropriate)    12/10/17 0124   Stroke (Hemorrhagic)   Problems Assessed (Stroke (Hemorrhagic)) all   Problems Present (Stroke (Hemorrhagic)) muscle tone abnormal;acute neurologic deterioration;communication impairment;eating/swallowing impairment;hemodynamic instability;motor/sensory impairment

## 2017-12-10 NOTE — THERAPY EVALUATION
Acute Care - Physical Therapy Initial Evaluation  [facility]     Patient Name: Lois Brennan  : 1940  MRN: [id number]  Today's Date: 12/10/2017   Onset of Illness/Injury or Date of Surgery Date: 17  Date of Referral to PT: 17  Referring Physician: Dr. Espitia      Admit Date: 2017     Visit Dx:    ICD-10-CM ICD-9-CM   1. Intracranial hematoma, right, without loss of consciousness, initial encounter S06.340A 853.01   2. Impaired mobility and ADLs Z74.09 799.89   3. Impaired functional mobility, balance, gait, and endurance Z74.09 V49.89     Patient Active Problem List   Diagnosis   • Intracranial hemorrhage   • Essential hypertension     Past Medical History:   Diagnosis Date   • Dementia    • Diabetes mellitus    • GERD (gastroesophageal reflux disease)    • Hypertension    • Rheumatoid arthritis    • TIA (transient ischemic attack)      Past Surgical History:   Procedure Laterality Date   • CATARACT EXTRACTION, BILATERAL     • EYE SURGERY            PT ASSESSMENT (last 72 hours)      PT Evaluation       12/10/17 0931 12/10/17 0930    Rehab Evaluation    Document Type evaluation  -SJ evaluation  -TA    Subjective Information no complaints;agree to therapy  -SJ agree to therapy;no complaints  -TA    Patient Effort, Rehab Treatment good  -SJ good  -TA    Symptoms Noted During/After Treatment none  -SJ significant change in vital signs;other (see comments)   Increased HR to 160; RN notified  -TA    General Information    Patient Profile Review yes  -SJ yes  -TA    Onset of Illness/Injury or Date of Surgery Date 17  -SJ 17  -TA    Referring Physician Dr. Espitia  -SJ Dr Espitia  -TA    General Observations Pt in ICU, supine in bed,   -SJ Pt supine in bed, RA, tele, IV; no visitors present in room.  -TA    Pertinent History Of Current Problem 77 y.o.F found at home with L-sided weakness, CT (+) R frontal lobe hemorrhage  -SJ Pt to ED with L sided weakness, dysarthria;  PMH/o dementia, RA, TIAs  -TA    Precautions/Limitations fall precautions  -SJ fall precautions;insensate limb;other (see comments)   BRAINLISY insensate  -TA    Prior Level of Function independent:;all household mobility;community mobility;gait;transfer;bed mobility;ADL's  -SJ independent:;all household mobility;gait;transfer;bed mobility;ADL's;cooking   Questionable historian 2/2 PMH/o dementia  -TA    Equipment Currently Used at Home none  -SJ none  -TA    Plans/Goals Discussed With patient;agreed upon  -SJ patient;agreed upon  -TA    Risks Reviewed patient:;LOB;increased discomfort;lines disloged  -SJ patient:;LOB;dizziness;change in vital signs;increased discomfort;lines disloged  -TA    Benefits Reviewed patient:;improve function;increase independence;increase strength;increase balance;increase knowledge  -SJ patient:;improve function;increase independence;increase strength;increase balance;decrease pain;increase knowledge  -TA    Barriers to Rehab none identified  -SJ medically complex;previous functional deficit  -TA    Living Environment    Lives With alone  -SJ alone  -TA    Living Arrangements house  -SJ house  -TA    Home Accessibility  no concerns  -TA    Living Environment Comment Pt has family close by  -SJ     Clinical Impression    Date of Referral to PT 12/09/17  -SJ     PT Diagnosis impaired mobility  -SJ     Patient/Family Goals Statement get stronger  -SJ     Criteria for Skilled Therapeutic Interventions Met yes;treatment indicated  -SJ     Pathology/Pathophysiology Noted (Describe Specifically for Each System) neuromuscular  -SJ     Impairments Found (describe specific impairments) aerobic capacity/endurance;gait, locomotion, and balance;motor function;muscle performance  -SJ     Functional Limitations in Following Categories (Describe Specific Limitations) self-care;home management;community/leisure  -SJ     Rehab Potential good, to achieve stated therapy goals  -SJ     Predicted Duration of  Therapy Intervention (days/wks) 2wks  -SJ     Vital Signs    Pre Systolic BP Rehab 124  -  -TA    Pre Treatment Diastolic BP 57  -SJ 57  -TA    Post Systolic BP Rehab 143  -  -TA    Post Treatment Diastolic BP 80  -SJ 80  -TA    Pretreatment Heart Rate (beats/min) 98  -SJ 77  -TA    Intratreatment Heart Rate (beats/min) 160  -  -TA    Posttreatment Heart Rate (beats/min) 111  -  -TA    Pre SpO2 (%) 97  -SJ 97  -TA    O2 Delivery Pre Treatment room air  -SJ room air  -TA    Post SpO2 (%) 97  -SJ 97  -TA    O2 Delivery Post Treatment room air  -SJ room air  -TA    Pre Patient Position Supine  -SJ Supine  -TA    Intra Patient Position Standing  -SJ Standing  -TA    Post Patient Position Sitting  -SJ Sitting  -TA    Pain Assessment    Pain Assessment No/denies pain  -SJ No/denies pain  -TA    Vision Assessment/Intervention    Visual Impairment decreased tracking across midline;inattention to the left;left neglect;needs cues to attend visually  - visual impairment, right;decreased tracking across midline;decreased convergence;left neglect  -TA    Cognitive Assessment/Intervention    Current Cognitive/Communication Assessment impaired  -SJ impaired  -TA    Orientation Status oriented to;person  -SJ oriented to;person;situation;disoriented to;place;time  -TA    Follows Commands/Answers Questions 50% of the time;able to follow single-step instructions;needs cueing  - 75% of the time;able to follow single-step instructions;needs cueing;needs increased time;needs repetition  -TA    Personal Safety moderate impairment;decreased awareness, need for assist;decreased awareness, need for safety;decreased insight to deficits  -SJ mild impairment;decreased awareness, need for assist;decreased awareness, need for safety;decreased insight to deficits  -TA    Personal Safety Interventions fall prevention program maintained;gait belt;muscle strengthening facilitated;nonskid shoes/slippers when out of bed  -  fall prevention program maintained;gait belt;nonskid shoes/slippers when out of bed;supervised activity  -TA    ROM (Range of Motion)    General ROM no range of motion deficits identified  -SJ no range of motion deficits identified  -TA    General ROM Detail  AROM RUE WFL; AAROM/PROM LUE WFL  -TA    MMT (Manual Muscle Testing)    General MMT Assessment lower extremity strength deficits identified  -SJ upper extremity strength deficits identified  -TA    General MMT Assessment Detail  RUE 4+/5; L shld fl 3/5; L joints distally 1/5  -TA    Muscle Tone Assessment    Muscle Tone Assessment  LUE  -TA    LUE Muscle Tone Assessment  moderately increased tone  -TA    Bed Mobility, Assessment/Treatment    Bed Mobility, Assistive Device head of bed elevated;draw sheet  -SJ head of bed elevated;draw sheet  -TA    Bed Mob, Supine to Sit, [place] maximum assist (25% patient effort);2 person assist required;verbal cues required  -SJ maximum assist (25% patient effort);2 person assist required;verbal cues required  -TA    Bed Mob, Sit to Supine, [place]  not tested  -TA    Bed Mobility, Safety Issues cognitive deficits limit understanding;decreased use of arms for pushing/pulling;decreased use of legs for bridging/pushing  -SJ decreased use of arms for pushing/pulling;decreased use of legs for bridging/pushing;impaired trunk control for bed mobility  -TA    Bed Mobility, Impairments muscle tone abnormal;strength decreased;impaired balance;coordination impaired  -SJ muscle tone abnormal;sensation decreased;strength decreased;impaired balance;motor control impaired;postural control impaired;impaired vision  -TA    Bed Mobility, Comment cues for sequencing  -SJ Max  A x2 for all bed mobility; VCs for sequencing transitions  -TA    Transfer Assessment/Treatment    Transfers, Bed-Chair [place] moderate assist (50% patient effort);2 person assist required;verbal cues required  -SJ     Transfers, Sit-Stand [place]  moderate assist (50% patient effort);2 person assist required;verbal cues required  -SJ moderate assist (50% patient effort);2 person assist required;upper extremity support;verbal cues required  -TA    Transfers, Stand-Sit [place] minimum assist (75% patient effort);2 person assist required;verbal cues required  -SJ moderate assist (50% patient effort);2 person assist required;upper extremity support;verbal cues required  -TA    Transfers, Sit-Stand-Sit, Assist Device  other (see comments)   gait belt, BUE support  -TA    Transfer, Safety Issues balance decreased during turns;sequencing ability decreased;step length decreased  -SJ balance decreased during turns;sequencing ability decreased;step length decreased;weight-shifting ability decreased;loses balance backward  -TA    Transfer, Impairments muscle tone abnormal;strength decreased;impaired balance;coordination impaired;impaired vision  -SJ muscle tone abnormal;sensation decreased;strength decreased;impaired balance;motor control impaired;postural control impaired;impaired vision  -TA    Transfer, Comment stand pivot t/f from EOB to recliner with max cues for sequencing and safety  -SJ Increased tone L side  -TA    Gait Assessment/Treatment    Gait, [place] Level moderate assist (50% patient effort);2 person assist required;verbal cues required  -SJ     Gait, Distance (Feet) 15  -SJ     Gait, Gait Pattern Analysis swing-to gait  -SJ     Gait, Gait Deviations bhupinder decreased;decreased heel strike;double stance time increased;narrow base  -SJ     Gait, Safety Issues balance decreased during turns;sequencing ability decreased;step length decreased;weight-shifting ability decreased;loses balance backward  -SJ     Gait, Impairments muscle tone abnormal;strength decreased;impaired balance;coordination impaired  -SJ     Gait, Comment UE support provided as well as support through gait belt, cues for sequencing and safety awareness. Pt req cues for posture  due to backwards lean.  -SJ     Motor Skills/Interventions    Additional Documentation Balance Skills Training (Group)  -SJ Balance Skills Training (Group);Fine Motor Coordination Training (Group)  -TA    Balance Skills Training    Sitting-Level of Assistance Minimum assistance  -SJ Minimum assistance  -TA    Sitting-Balance Support Right upper extremity supported;Left upper extremity supported;Feet supported  -SJ Right upper extremity supported;Left upper extremity supported;Feet supported  -TA    Standing-Level of Assistance Minimum assistance;x2  -SJ Minimum assistance;x2  -TA    Static Standing Balance Support Right upper extremity supported;Left upper extremity supported  -SJ Right upper extremity supported;Left upper extremity supported  -TA    Standing-Balance Activities  Weight Shift A-P;Weight Shift R-L  -TA    Gait Balance-Level of Assistance Moderate assistance;x2  -SJ     Gait Balance Support Right upper extremity supported;Left upper extremity supported  -SJ     Fine Motor Coordination Training    Opposition  Right:;intact;Left:;impaired;other (comment)   L [id number] absent  -TA    Sensory Assessment/Intervention    Light Touch  LUE;RUE  -TA    LUE Light Touch  absent sensation  -TA    RUE Light Touch  WNL  -TA    Positioning and Restraints    Pre-Treatment Position in bed  -SJ in bed  -TA    Post Treatment Position chair  -SJ chair  -TA    In Chair notified nsg;reclined;call light within reach;encouraged to call for assist;exit alarm on;with other staff;waffle cushion;heels elevated  -SJ notified nsg;reclined;call light within reach;encouraged to call for assist;exit alarm on;with other staff;RUE elevated;LUE elevated;legs elevated  -TA      12/09/17 2022       General Information    Equipment Currently Used at Home none  -JE     Living Environment    Lives With alone  -JE     Living Arrangements house  -JE     Home Accessibility no concerns  -JE     Stair Railings at Home none  -JE     Type of  Financial/Environmental Concern none  -JE     Transportation Available car;family or friend will provide  -JE       User Key  (r) = Recorded By, (t) = Taken By, (c) = Cosigned By    Initials Name Provider Type     Yumiko Arboleda, PT Physical Therapist    BECCA Ramos, OT Occupational Therapist    AMANDO Landaverde, RN Registered Nurse          Physical Therapy Education     Title: PT OT SLP Therapies (Active)     Topic: Physical Therapy (Active)     Point: Mobility training (Active)    Learning Progress Summary    Learner Readiness Method Response Comment Documented by Status   Patient Acceptance E NR   12/10/17 1025 Active               Point: Home exercise program (Active)    Learning Progress Summary    Learner Readiness Method Response Comment Documented by Status   Patient Acceptance E [facility] 12/10/17 1025 Active               Point: Body mechanics (Active)    Learning Progress Summary    Learner Readiness Method Response Comment Documented by Status   Patient Acceptance E NR   12/10/17 1025 Active               Point: Precautions (Active)    Learning Progress Summary    Learner Readiness Method Response Comment Documented by Status   Patient Acceptance E [facility] 12/10/17 1025 Active                      User Key     Initials Effective Dates Name Provider Type [facility] 06/19/15 -  Yumiko Arboleda, PT Physical Therapist PT                PT Recommendation and Plan  Anticipated Equipment Needs At Discharge: other (see comments) (TBD)  Anticipated Discharge Disposition: inpatient rehabilitation facility  Planned Therapy Interventions: balance training, bed mobility training, gait training, home exercise program, neuromuscular re-education, patient/family education, strengthening, transfer training  PT Frequency: daily, per priority policy  Plan of Care Review  Plan Of Care Reviewed With: patient  Progress: progress toward functional goals as expected  Outcome Summary/Follow up Plan: PT  patsy completed. Pt able to ambulate 15ft with modA x2, limited by impaired muscle tone, weakness, L-neglect, and tachycardia with activity (HR ~160bpm). Pt motivated to improve functional status and is a good therapy candidate. PT recommends d/c to acute rehab.          IP PT Goals       12/10/17 1021          Transfer Training PT LTG    Transfer Training PT LTG, Date Established 12/10/17  -SJ      Transfer Training PT LTG, Time to Achieve 2 wks  -SJ      Transfer Training PT LTG, Activity Type bed to chair /chair to bed;sit to stand/stand to sit  -SJ      Transfer Training PT LTG, [place] Level contact guard assist  -SJ      Transfer Training PT LTG, Outcome goal ongoing  -SJ      Gait Training PT LTG    Gait Training Goal PT LTG, Date Established 12/10/17  -SJ      Gait Training Goal PT LTG, Time to Achieve 2 wks  -SJ      Gait Training Goal PT LTG, [place] Level minimum assist (75% patient effort);2 person assist required  -SJ      Gait Training Goal PT LTG, Distance to Achieve 100  -SJ      Gait Training Goal PT LTG, Outcome goal ongoing  -SJ      Static Sitting Balance PT LTG    Static Sitting Balance PT LTG, Date Established 12/10/17  -SJ      Static Sitting Balance PT LTG, Time to Achieve 2 wks  -SJ      Static Sitting Balance PT LTG, [place] Level supervision required  -SJ      Static Sitting Balance PT LTG, Assist Device UE Support  -SJ      Static Sitting Balance PT LTG, Outcome goal ongoing  -SJ        User Key  (r) = Recorded By, (t) = Taken By, (c) = Cosigned By    Initials Name Provider Type    QUE Arboleda PT Physical Therapist                Outcome Measures       12/10/17 0931 12/10/17 0930       How much help from another person do you currently need...    Turning from your back to your side while in flat bed without using bedrails? 2  -SJ      Moving from lying on back to sitting on the side of a flat bed without bedrails? 2  -SJ      Moving to and from a bed to a chair  (including a wheelchair)? 2  -SJ      Standing up from a chair using your arms (e.g., wheelchair, bedside chair)? 2  -SJ      Climbing 3-5 steps with a railing? 1  -SJ      To walk in hospital room? 2  -SJ      AM-PAC 6 Clicks Score 11  -SJ      How much help from another is currently needed...    Putting on and taking off regular lower body clothing?  1  -TA     Bathing (including washing, rinsing, and drying)  2  -TA     Toileting (which includes using toilet bed pan or urinal)  1  -TA     Putting on and taking off regular upper body clothing  1  -TA     Eating meals  1  -TA     Modified [place] Scale    Modified [place] Scale 4 - Moderately severe disability.  Unable to walk without assistance, and unable to attend to own bodily needs without assistance.  -SJ 4 - Moderately severe disability.  Unable to walk without assistance, and unable to attend to own bodily needs without assistance.  -TA     Functional Assessment    Outcome Measure Options AM-PAC 6 Clicks Basic Mobility (PT);Modified [place]  -SJ AM-PAC 6 Clicks Daily Activity (OT);Modified [place]  -TA       User Key  (r) = Recorded By, (t) = Taken By, (c) = Cosigned By    Initials Name Provider Type    QUE Arboleda PT Physical Therapist    TA Azam Ramos, OT Occupational Therapist           Time Calculation:         PT Charges       12/10/17 1028          Time Calculation    Start Time 0931  -SJ      PT Received On 12/10/17  -      PT Goal Re-Cert Due Date 12/20/17  -        User Key  (r) = Recorded By, (t) = Taken By, (c) = Cosigned By    Initials Name Provider Type     Yumiko Arboleda PT Physical Therapist          Therapy Charges for Today     Code Description Service Date Service Provider Modifiers Qty    [id number] HC PT EVAL HIGH COMPLEXITY 4 12/10/2017 Yumiko Arboleda, PT GP 1          PT G-Codes  Outcome Measure Options: AM-PAC 6 Clicks Basic Mobility (PT), Shabbir Arboleda PT  12/10/2017

## 2017-12-11 ENCOUNTER — APPOINTMENT (OUTPATIENT)
Dept: GENERAL RADIOLOGY | Facility: HOSPITAL | Age: 77
End: 2017-12-11

## 2017-12-11 PROBLEM — R13.13 PHARYNGEAL DYSPHAGIA: Status: ACTIVE | Noted: 2017-12-11

## 2017-12-11 PROBLEM — F03.90 DEMENTIA (HCC): Status: ACTIVE | Noted: 2017-12-11

## 2017-12-11 PROBLEM — I48.0 PAROXYSMAL ATRIAL FIBRILLATION (HCC): Status: ACTIVE | Noted: 2017-12-11

## 2017-12-11 PROBLEM — K21.9 GERD (GASTROESOPHAGEAL REFLUX DISEASE): Status: ACTIVE | Noted: 2017-12-11

## 2017-12-11 PROBLEM — M06.9 RHEUMATOID ARTHRITIS (HCC): Status: ACTIVE | Noted: 2017-12-11

## 2017-12-11 LAB
ANION GAP SERPL CALCULATED.3IONS-SCNC: 8 MMOL/L (ref 3–11)
BUN BLD-MCNC: 21 MG/DL (ref 9–23)
BUN/CREAT SERPL: 21 (ref 7–25)
CALCIUM SPEC-SCNC: 8.4 MG/DL (ref 8.7–10.4)
CHLORIDE SERPL-SCNC: 108 MMOL/L (ref 99–109)
CO2 SERPL-SCNC: 22 MMOL/L (ref 20–31)
CREAT BLD-MCNC: 1 MG/DL (ref 0.6–1.3)
CRP SERPL-MCNC: 0.06 MG/DL (ref 0–1)
DEPRECATED RDW RBC AUTO: 47.3 FL (ref 37–54)
ERYTHROCYTE [DISTWIDTH] IN BLOOD BY AUTOMATED COUNT: 14.7 % (ref 11.3–14.5)
GFR SERPL CREATININE-BSD FRML MDRD: 54 ML/MIN/1.73
GLUCOSE BLD-MCNC: 172 MG/DL (ref 70–100)
GLUCOSE BLDC GLUCOMTR-MCNC: 131 MG/DL (ref 70–130)
GLUCOSE BLDC GLUCOMTR-MCNC: 172 MG/DL (ref 70–130)
GLUCOSE BLDC GLUCOMTR-MCNC: 203 MG/DL (ref 70–130)
HCT VFR BLD AUTO: 34.8 % (ref 34.5–44)
HGB BLD-MCNC: 11.2 G/DL (ref 11.5–15.5)
MAGNESIUM SERPL-MCNC: 2 MG/DL (ref 1.3–2.7)
MCH RBC QN AUTO: 28.2 PG (ref 27–31)
MCHC RBC AUTO-ENTMCNC: 32.2 G/DL (ref 32–36)
MCV RBC AUTO: 87.7 FL (ref 80–99)
PHOSPHATE SERPL-MCNC: 3.4 MG/DL (ref 2.4–5.1)
PLATELET # BLD AUTO: 209 10*3/MM3 (ref 150–450)
PMV BLD AUTO: 10.3 FL (ref 6–12)
POTASSIUM BLD-SCNC: 4 MMOL/L (ref 3.5–5.5)
PREALB SERPL-MCNC: 15.7 MG/DL (ref 10–40)
RBC # BLD AUTO: 3.97 10*6/MM3 (ref 3.89–5.14)
SODIUM BLD-SCNC: 138 MMOL/L (ref 132–146)
WBC NRBC COR # BLD: 10.6 10*3/MM3 (ref 3.5–10.8)

## 2017-12-11 PROCEDURE — 86140 C-REACTIVE PROTEIN: CPT

## 2017-12-11 PROCEDURE — 84100 ASSAY OF PHOSPHORUS: CPT | Performed by: INTERNAL MEDICINE

## 2017-12-11 PROCEDURE — 85027 COMPLETE CBC AUTOMATED: CPT | Performed by: INTERNAL MEDICINE

## 2017-12-11 PROCEDURE — 84134 ASSAY OF PREALBUMIN: CPT

## 2017-12-11 PROCEDURE — 74000 HC ABDOMEN KUB: CPT

## 2017-12-11 PROCEDURE — 97110 THERAPEUTIC EXERCISES: CPT

## 2017-12-11 PROCEDURE — 92612 ENDOSCOPY SWALLOW (FEES) VID: CPT

## 2017-12-11 PROCEDURE — 82962 GLUCOSE BLOOD TEST: CPT

## 2017-12-11 PROCEDURE — 80048 BASIC METABOLIC PNL TOTAL CA: CPT | Performed by: INTERNAL MEDICINE

## 2017-12-11 PROCEDURE — 99233 SBSQ HOSP IP/OBS HIGH 50: CPT | Performed by: INTERNAL MEDICINE

## 2017-12-11 PROCEDURE — 83735 ASSAY OF MAGNESIUM: CPT | Performed by: INTERNAL MEDICINE

## 2017-12-11 RX ORDER — PROPRANOLOL HCL 60 MG
60 CAPSULE, EXTENDED RELEASE 24HR ORAL EVERY 12 HOURS
COMMUNITY
End: 2017-12-15 | Stop reason: HOSPADM

## 2017-12-11 RX ORDER — DONEPEZIL HYDROCHLORIDE 10 MG/1
10 TABLET, FILM COATED ORAL NIGHTLY
COMMUNITY

## 2017-12-11 RX ORDER — DILTIAZEM HCL IN NACL,ISO-OSM 125 MG/125
5-15 PLASTIC BAG, INJECTION (ML) INTRAVENOUS
Status: DISCONTINUED | OUTPATIENT
Start: 2017-12-11 | End: 2017-12-12

## 2017-12-11 RX ORDER — PREDNISONE 1 MG/1
7.5 TABLET ORAL
COMMUNITY

## 2017-12-11 RX ORDER — METFORMIN HYDROCHLORIDE 500 MG/1
500 TABLET, EXTENDED RELEASE ORAL
COMMUNITY

## 2017-12-11 RX ORDER — METFORMIN HYDROCHLORIDE 500 MG/1
1000 TABLET, EXTENDED RELEASE ORAL
COMMUNITY
End: 2018-01-05 | Stop reason: HOSPADM

## 2017-12-11 RX ADMIN — FAMOTIDINE 20 MG: 10 INJECTION, SOLUTION INTRAVENOUS at 20:43

## 2017-12-11 RX ADMIN — DILTIAZEM HCL-SODIUM CHLORIDE IV SOLN 125 MG/125ML-0.9% 5 MG/HR: 125-0.9/125 SOLUTION at 18:57

## 2017-12-11 RX ADMIN — INSULIN HUMAN 2 UNITS: 100 INJECTION, SOLUTION PARENTERAL at 00:12

## 2017-12-11 RX ADMIN — INSULIN HUMAN 2 UNITS: 100 INJECTION, SOLUTION PARENTERAL at 05:40

## 2017-12-11 RX ADMIN — SODIUM CHLORIDE 75 ML/HR: 9 INJECTION, SOLUTION INTRAVENOUS at 06:23

## 2017-12-11 RX ADMIN — FAMOTIDINE 20 MG: 10 INJECTION, SOLUTION INTRAVENOUS at 08:06

## 2017-12-11 RX ADMIN — INSULIN HUMAN 4 UNITS: 100 INJECTION, SOLUTION PARENTERAL at 12:58

## 2017-12-11 NOTE — PLAN OF CARE
Problem: Patient Care Overview (Adult)  Goal: Plan of Care Review  Outcome: Ongoing (interventions implemented as appropriate)  Goal: Adult Individualization and Mutuality  Outcome: Ongoing (interventions implemented as appropriate)  Goal: Discharge Needs Assessment  Outcome: Ongoing (interventions implemented as appropriate)    Problem: Fall Risk (Adult)  Goal: Absence of Falls  Outcome: Ongoing (interventions implemented as appropriate)    Problem: Skin Integrity Impairment, Risk/Actual (Adult)  Goal: Identify Related Risk Factors and Signs and Symptoms  Outcome: Ongoing (interventions implemented as appropriate)  Goal: Skin Integrity/Wound Healing  Outcome: Ongoing (interventions implemented as appropriate)    Problem: Stroke (Hemorrhagic) (Adult)  Goal: Signs and Symptoms of Listed Potential Problems Will be Absent or Manageable (Stroke)  Outcome: Ongoing (interventions implemented as appropriate)

## 2017-12-11 NOTE — PAYOR COMM NOTE
"Lois Dixon (77 y.o. Female)     Date of Birth Social Security Number Address Home Phone MRN    [id number]  [id number] [facility] [phone] [id number]    [employer] Marital Status          Sabianism        Admission Date Admission Type Admitting Provider Attending Provider Department, Room/Bed    12/9/17 Emergency Aquiles Silva MD Mueller, Joseph C, MD [facility] 2B ICU, N231/1    Discharge Date Discharge Disposition Discharge Destination                      Attending Provider: Aquiles Silva MD     Allergies:  Ace Inhibitors, Penicillins    Isolation:  None   Infection:  None   Code Status:  FULL    Ht:  157.5 cm (62\")   Wt:  68 kg (150 lb)    Admission Cmt:  None   Principal Problem:  Intracranial hemorrhage [I62.9]                 Active Insurance as of 12/9/2017     Primary Coverage     Payor Plan Insurance Group Employer/Plan Group    ANTHEM MEDICARE REPLACEMENT ANTHEM MEDICARE ADVANTAGE [id number]     Payor Plan Address Payor Plan Phone Number Effective From Effective To    [address] [phone] 1/1/2016     [place] [address]       Subscriber Name Subscriber Birth Date Member ID       LOIS DIXON 1940 [id number]                 Emergency Contacts      (Rel.) Home Phone Work Phone Mobile Phone    Keely Leblanc (Power of ) -- -- [phone]               History & Physical      Aury Espitia MD at 12/9/2017  4:40 PM              ICU ADMISSION NOTE    Chief complaint sided weakness    Subjective     Patient is a 77 y.o. female was not answering her daughter this morning. She lives next door to her sister-in-law who has a key. When she entered the house the patient was still in bed and could not move her left side and was mumbling. She called the ambulance. She did talk to a family member on the phone last night and was normal. In the emergency room her CT scan revealed a right frontal lobe " "hemorrhage with edema and mass effect. She had significant hypertension and was started on a Cardene drip. Initially she was not moving her left side but her symptoms did not improve and she was able to lift her left leg. She does have a history of transient ischemic attacks and had one approximately one week ago. There is no history of atrial fibrillation she is a lifetime nonsmoker. She does use some nitroglycerin but denies ever having a heart attack. She is a lifetime nonsmoker.    Review of Systems  Review of Systems   Constitutional: Positive for activity change. Negative for fever.   Eyes: Positive for visual disturbance.   Respiratory: Negative.    Cardiovascular: Negative.    Gastrointestinal: Negative.    Musculoskeletal: Positive for arthralgias.   Neurological: Positive for facial asymmetry, speech difficulty and weakness. Negative for headaches.        Home Medications  Currently her granddaughter in law is at the bedside and does not know what her home medications include      (Not in a hospital admission)    History  Past Medical History:   Diagnosis Date   • Dementia    • Diabetes mellitus    • GERD (gastroesophageal reflux disease)    • Hypertension    • Rheumatoid arthritis    • TIA (transient ischemic attack)      Past Surgical History:   Procedure Laterality Date   • CATARACT EXTRACTION, BILATERAL     • EYE SURGERY       Family History   Problem Relation Age of Onset   • Family history unknown: Yes     Social History   Substance Use Topics   • Smoking status: Never Smoker   • Smokeless tobacco: None   • Alcohol use No       (Not in a hospital admission)  Allergies:  Penicillins      Objective     Vital Signs  Blood pressure 153/61, pulse 105, temperature 98.7 °F (37.1 °C), temperature source Oral, resp. rate 18, height 157.5 cm (62\"), weight 68 kg (150 lb), SpO2 95 %.    Physical Exam:  General Appearance:  Thin elderly white woman in no distress    Head:  Normocephalic, atraumatic    Eyes:     "      Eyes are deviated to the right, pupils are equal and reactive    Ears:     Throat: Oral mucosa is moist    Neck: Neck is supple, no carotid bruit    Back:      Lungs:   Symmetric chest expansion without wheeze or rhonchi     Heart:  Irregular, frequent early beats, S1, S2 auscultated  SERGEI   Abdomen:   L sounds are present, nondistended, soft    Rectal:     Deferred   Extremities:    No edema or cyanosis    Pulses:   Pedal pulses present    Skin: Warm and dry    Lymph nodes:    Neurologic:   Left upper extremity is weak, 4 out of 5 and has a resting tremor. Left upper extremity is 4 out of 5. She can lift it off the bed and hold it but it will drift downward. Right side is 5 out of 5 upper and lower extremities. She is oriented to person and hospital but does not know the date. Eyes are deviated to the right and she neglects her left side. Left facial weakness. Speech is fluent.        Results Review:   Lab Results (last 24 hours)     Procedure Component Value Units Date/Time    Lavender Top [[id number]] Collected:  12/09/17 1539    Specimen:  Blood Updated:  12/09/17 1542    Gold Top - SST [[id number]] Collected:  12/09/17 1539    Specimen:  Blood Updated:  12/09/17 1542    Green Top (Gel) [[id number]] Collected:  12/09/17 1539    Specimen:  Blood Updated:  12/09/17 1542    Light Blue Top [[id number]] Collected:  12/09/17 1539    Specimen:  Blood Updated:  12/09/17 1542    [place] Draw [[id number]] Collected:  12/09/17 1539    Specimen:  Blood Updated:  12/09/17 1545    Narrative:       The following orders were created for panel order [place] Draw.  Procedure                               Abnormality         Status                     ---------                               -----------         ------                     Light Blue Top[[id number]]                                   In process                 Green Top (Gel)[[id number]]                                  In process                 Lavender Top[[id number]]                                      In process                 Gold Top - SST[[id number]]                                   In process                 Green Top (No Gel)[[id number]]                                                            Please view results for these tests on the individual orders.    CBC & Differential [[id number]] Collected:  12/09/17 1539    Specimen:  Blood Updated:  12/09/17 1547    Narrative:       The following orders were created for panel order CBC & Differential.  Procedure                               Abnormality         Status                     ---------                               -----------         ------                     CBC Auto Differential[[id number]]        Abnormal            Final result                 Please view results for these tests on the individual orders.    CBC Auto Differential [[id number]]  (Abnormal) Collected:  12/09/17 1539    Specimen:  Blood Updated:  12/09/17 1547     WBC 7.24 10*3/mm3      RBC 4.17 10*6/mm3      Hemoglobin 11.9 g/dL      Hematocrit 36.6 %      MCV 87.8 fL      MCH 28.5 pg      MCHC 32.5 g/dL      RDW 14.4 %      RDW-SD 46.4 fl      MPV 10.1 fL      Platelets 224 10*3/mm3      Neutrophil % 73.1 (H) %      Lymphocyte % 19.9 (L) %      Monocyte % 6.2 %      Eosinophil % 0.4 %      Basophil % 0.3 %      Immature Grans % 0.1 %      Neutrophils, Absolute 5.29 10*3/mm3      Lymphocytes, Absolute 1.44 10*3/mm3      Monocytes, Absolute 0.45 10*3/mm3      Eosinophils, Absolute 0.03 10*3/mm3      Basophils, Absolute 0.02 10*3/mm3      Immature Grans, Absolute 0.01 10*3/mm3     Comprehensive Metabolic Panel [[id number]]  (Abnormal) Collected:  12/09/17 1539    Specimen:  Blood Updated:  12/09/17 1610     Glucose 166 (H) mg/dL      BUN 11 mg/dL      Creatinine 1.00 mg/dL      Sodium 135 mmol/L      Potassium 3.7 mmol/L      Chloride 102 mmol/L      CO2 27.0 mmol/L      Calcium 8.9 mg/dL      Total Protein 6.5 g/dL      Albumin 3.80 g/dL      ALT  (SGPT) 7 U/L      AST (SGOT) 12 U/L      Alkaline Phosphatase 75 U/L      Total Bilirubin 0.6 mg/dL      eGFR Non African Amer 54 (L) mL/min/1.73      eGFR  African Amer 66 mL/min/1.73      Globulin 2.7 gm/dL      A/G Ratio 1.4 (L) g/dL      BUN/Creatinine Ratio 11.0     Anion Gap 6.0 mmol/L     Narrative:       National [employer] Guidelines    Stage     Description        GFR  1         Normal or High     90+  2         Mild decrease      60-89  3         Moderate decrease  30-59  4         Severe decrease    15-29  5         Kidney failure     <15    Lipase [[id number]]  (Normal) Collected:  12/09/17 1539    Specimen:  Blood Updated:  12/09/17 1610     Lipase 22 U/L     Troponin [[id number]]  (Normal) Collected:  12/09/17 1539    Specimen:  Blood Updated:  12/09/17 1614     Troponin I 0.018 ng/mL     Protime-INR [[id number]] Collected:  12/09/17 1539    Specimen:  Blood Updated:  12/09/17 1620     Protime 11.2 Seconds      INR 1.03    Narrative:       Therapeutic Ranges for INR: 2.0-3.0 (PT 20-30)                              2.5-3.5 (PT 25-34)    aPTT [[id number]]  (Normal) Collected:  12/09/17 1539    Specimen:  Blood Updated:  12/09/17 1620     PTT 24.0 seconds     Narrative:       PTT = The equivalent PTT values for the therapeutic range of heparin levels at 0.3 to 0.5 U/ml are 45 to 60 seconds.        Imaging Results (last 24 hours)     Procedure Component Value Units Date/Time    CT Head Without Contrast Stroke Protocol [[id number]] Collected:  12/09/17 1531     Updated:  12/09/17 1546    Narrative:       EXAMINATION: CT HEAD WO CONTRAST STROKE PROTOCOL-      INDICATION: Stroke Protocol (onset > 12 hrs)         TECHNIQUE:      CT data set of the brain was performed without intravenous contrast.     The radiation dose reduction device was turned on for each scan per the  ALARA (As Low as Reasonably Achievable) protocol.     COMPARISON: NONE     FINDINGS:   1.Intra-axial dominant right cerebral hemorrhage  is seen in the right  frontal parietal region with a rounded lobular configuration of high  attenuation intra-axial blood surrounded by edema and creating mass  effect. The intra-axial bleed measures 3.7 x 3.8 cm. Volumetric  measurement is 12.3 cubic centimeters.  2. A second intra-axial hemorrhage is identified in the right occipital  area. This could represent a calcified meningioma along the posterior  right falx but a small petechial hemorrhage cannot be excluded and this  measures 1.4 cm.  3. There is evidence of a subarachnoid component over the right  convexity with a small petechial hemorrhages at the right convexity.  There is mass effect with edema and there is a slight midline shift from  right to left.             Impression:          Intra-axial cerebral bleed, right frontal parietal, with surrounding  edema and mass effect. Measurements and volumetric measurements are  offered above.     There is a 14 mm high attenuation structure adjacent to the posterior  right falx cerebri which could be a densely calcified meningioma or  second hemorrhage.     There is a small area of subarachnoid bleed along the right parietal  convexity in the sulcal GYRAL recesses.     There is considerable mass effect in the right hemisphere.              This report was finalized on 12/9/2017 3:44 PM by Dr. Jaxon Martins MD.       XR Chest 1 View [[id number]] Updated:  12/09/17 1616       Test Reason : Stroke Protocol (onset > 12 hrs)  Blood Pressure : **/** mmHG  Vent. Rate : 071 BPM     Atrial Rate : 071 BPM     P-R Int : 202 ms          QRS Dur : 114 ms      QT Int : 448 ms       P-R-T Axes : 090 -45 020 degrees     QTc Int : 486 ms    Normal sinus rhythm  Incomplete right bundle branch block  Left anterior fascicular block  Moderate voltage criteria for LVH, may be normal variant  Abnormal ECG  No previous ECGs available  Confirmed by JULY OLIVIA, HOLIS ([id number]) on 12/9/2017 3:55:18 PM    PROBLEM LIST    #1  intracranial  hemorrhage, right frontal lobe with edema and mass effect resulting in left facial and left-sided weakness. There is also a possible meningioma. This is likely a hypertensive bleed. He has no history of head trauma.    #2  poorly controlled hypertension currently on a Cardene drip with improvement.    #3  underlying dementia with a history of transient ischemic attacks    #4  stress hyperglycemia    Plan:  Cardene drip for hypertension  Neuro checks every hour, NIHSS  Speech, physical therapy, occupational therapy evaluation  Echocardiogram, carotid duplex to evaluate her transient ischemic attacks  Neurosurgery to evaluate  Consider Decadron for edema  Sliding-scale insulin  Once she passes a swallow evaluation consider oral antihypertensives    Aury Espitia MD  12/09/17  4:40 PM    Time: 55min    This note was produced with a voice recognition program and may have uncorrected errors.        Electronically signed by Aury Espitia MD at 12/9/2017  5:56 PM           Emergency Department Notes      Juan Miguel Van MD at 12/9/2017  3:24 PM      Procedure Orders:    1. Critical Care [[id number]] ordered by Juan Miguel Van MD at 12/09/17 1535                Subjective   HPI Comments: Ms. Lois Brennan is a 77 y.o. female who presents to the ED with c/o stroke sx. EMS brings in this patient after her neighbor called in for a wellness check. No one had been able to contact her since last night and family had been concerned. EMS notes of initial right sided facial droop but that has improved at first. She had right sided eye deviation with left sided paralysis. Pt herself notes of no hx of CVA that she knows of and denies any recent headaches, falls, head trauma or anticoagulation use. She has no other acute sx at this time. Last known well was yesterday evening.     Patient is a 77 y.o. female presenting with neurologic complaint.   History provided by:  EMS personnel  History limited by:  Acuity of  condition  Neurologic Problem   The patient's primary symptoms include focal weakness and weakness (Left sided paralysis). This is a new problem. The current episode started today. The neurological problem developed suddenly. Last Known Well Instant: Yesterday evening.  There was facial and right-sided focality noted. Pertinent negatives include no headaches. There is no history of a CVA or head trauma.       Review of Systems   Unable to perform ROS: Acuity of condition   Neurological: Positive for focal weakness, facial asymmetry (Right) and weakness (Left sided paralysis). Negative for headaches.       Past Medical History:   Diagnosis Date   • Arthritis    • Dementia    • Diabetes mellitus    • GERD (gastroesophageal reflux disease)    • Hypertension    • TIA (transient ischemic attack)        Allergies   Allergen Reactions   • Penicillins      unknown       Past Surgical History:   Procedure Laterality Date   • EYE SURGERY         History reviewed. No pertinent family history.    Social History     Social History   • Marital status: N/A     Spouse name: N/A   • Number of children: N/A   • Years of education: N/A     Social History Main Topics   • Smoking status: Never Smoker   • Smokeless tobacco: None   • Alcohol use No   • Drug use: No   • Sexual activity: Defer     Other Topics Concern   • None     Social History Narrative   • None         Objective   Physical Exam   Constitutional: She appears well-developed and well-nourished. No distress.   HENT:   Head: Normocephalic and atraumatic.   Nose: Nose normal.   Eyes: Conjunctivae are normal. No scleral icterus.   Right sided eye deviation   Neck: Normal range of motion. Neck supple.   Cardiovascular: Normal rate, regular rhythm, normal heart sounds and intact distal pulses.    No murmur heard.  Pulmonary/Chest: Effort normal and breath sounds normal. No respiratory distress.   Abdominal: Soft. There is no tenderness.   Musculoskeletal: Normal range of motion.    Neurological: She is alert.   Left sided neglect   Skin: Skin is warm and dry. She is not diaphoretic.   Psychiatric: She has a normal mood and affect. Her behavior is normal.   Nursing note and vitals reviewed.      Critical Care  Performed by: ELIA MCDOWELL  Authorized by: ELIA MCDOWELL     Critical care provider statement:     Critical care time (minutes):  40    Critical care time was exclusive of:  Separately billable procedures and treating other patients    Critical care was necessary to treat or prevent imminent or life-threatening deterioration of the following conditions:  CNS failure or compromise    Critical care was time spent personally by me on the following activities:  Evaluation of patient's response to treatment, examination of patient, obtaining history from patient or surrogate, ordering and performing treatments and interventions, ordering and review of laboratory studies, ordering and review of radiographic studies, re-evaluation of patient's condition, development of treatment plan with patient or surrogate and discussions with consultants              ED Course  ED Course   Comment By Time   HH score of 1. [facility] 12/09 1536   Dr. Mcdowell discussed case with Dr. Barboza, on call stroke neurologist, who recommends admission to the ICU. [facility] 12/09 1538   Dr. Mcdowell discussed case with Dr. Espitia, intensivist, who will admit the patient. [facility] 12/09 1611   Patient is placed on a Cardene drip.  Her pressure has progressively declined.  Laboratory evaluation is fairly unrewarding.  This is mildly elevated.  Platelet count is normal. Elia Mcdowell MD 12/09 1617   Blood pressures improved.  Her left sided neglect has actually improved with blood pressure management.Patient is seen and evaluated by Dr Ludwig in the emergency department.  HeFamily is arrived and is aware of status and diagnosis.  Patient is waiting transfer to the ICU now. lEia Mcdowell MD 12/09 0699      Recent Results (from the past 24 hour(s))   CBC Auto Differential    Collection Time: 12/09/17  3:39 PM   Result Value Ref Range    WBC 7.24 3.50 - 10.80 10*3/mm3    RBC 4.17 3.89 - 5.14 10*6/mm3    Hemoglobin 11.9 11.5 - 15.5 g/dL    Hematocrit 36.6 34.5 - 44.0 %    MCV 87.8 80.0 - 99.0 fL    MCH 28.5 27.0 - 31.0 pg    MCHC 32.5 32.0 - 36.0 g/dL    RDW 14.4 11.3 - 14.5 %    RDW-SD 46.4 37.0 - 54.0 fl    MPV 10.1 6.0 - 12.0 fL    Platelets 224 150 - 450 10*3/mm3    Neutrophil % 73.1 (H) 41.0 - 71.0 %    Lymphocyte % 19.9 (L) 24.0 - 44.0 %    Monocyte % 6.2 0.0 - 12.0 %    Eosinophil % 0.4 0.0 - 3.0 %    Basophil % 0.3 0.0 - 1.0 %    Immature Grans % 0.1 0.0 - 0.6 %    Neutrophils, Absolute 5.29 1.50 - 8.30 10*3/mm3    Lymphocytes, Absolute 1.44 0.60 - 4.80 10*3/mm3    Monocytes, Absolute 0.45 0.00 - 1.00 10*3/mm3    Eosinophils, Absolute 0.03 0.00 - 0.30 10*3/mm3    Basophils, Absolute 0.02 0.00 - 0.20 10*3/mm3    Immature Grans, Absolute 0.01 0.00 - 0.03 10*3/mm3   Comprehensive Metabolic Panel    Collection Time: 12/09/17  3:39 PM   Result Value Ref Range    Glucose 166 (H) 70 - 100 mg/dL    BUN 11 9 - 23 mg/dL    Creatinine 1.00 0.60 - 1.30 mg/dL    Sodium 135 132 - 146 mmol/L    Potassium 3.7 3.5 - 5.5 mmol/L    Chloride 102 99 - 109 mmol/L    CO2 27.0 20.0 - 31.0 mmol/L    Calcium 8.9 8.7 - 10.4 mg/dL    Total Protein 6.5 5.7 - 8.2 g/dL    Albumin 3.80 3.20 - 4.80 g/dL    ALT (SGPT) 7 7 - 40 U/L    AST (SGOT) 12 0 - 33 U/L    Alkaline Phosphatase 75 25 - 100 U/L    Total Bilirubin 0.6 0.3 - 1.2 mg/dL    eGFR Non African Amer 54 (L) >60 mL/min/1.73    eGFR  African Amer 66 >60 mL/min/1.73    Globulin 2.7 gm/dL    A/G Ratio 1.4 (L) 1.5 - 2.5 g/dL    BUN/Creatinine Ratio 11.0 7.0 - 25.0    Anion Gap 6.0 3.0 - 11.0 mmol/L   Protime-INR    Collection Time: 12/09/17  3:39 PM   Result Value Ref Range    Protime 11.2 9.6 - 11.5 Seconds    INR 1.03    aPTT    Collection Time: 12/09/17  3:39 PM   Result Value Ref  "Range    PTT 24.0 24.0 - 31.0 seconds   Lipase    Collection Time: 12/09/17  3:39 PM   Result Value Ref Range    Lipase 22 6 - 51 U/L   Troponin    Collection Time: 12/09/17  3:39 PM   Result Value Ref Range    Troponin I 0.018 <=0.039 ng/mL     Note: In addition to lab results from this visit, the labs listed above may include labs taken at another facility or during a different encounter within the last 24 hours. Please correlate lab times with ED admission and discharge times for further clarification of the services performed during this visit.    CT Head Without Contrast Stroke Protocol   Final Result       Intra-axial cerebral bleed, right frontal parietal, with surrounding   edema and mass effect. Measurements and volumetric measurements are   offered above.       There is a 14 mm high attenuation structure adjacent to the posterior   right falx cerebri which could be a densely calcified meningioma or   second hemorrhage.       There is a small area of subarachnoid bleed along the right parietal   convexity in the sulcal GYRAL recesses.       There is considerable mass effect in the right hemisphere.                   This report was finalized on 12/9/2017 3:44 PM by Dr. Jaxon Martins MD.          XR Chest 1 View    (Results Pending)     Vitals:    12/09/17 1554 12/09/17 1600 12/09/17 1605 12/09/17 1615   BP: 170/76 157/79  156/65   BP Location:       Patient Position:       Pulse: 90 88  96   Resp:       Temp:       TempSrc:       SpO2: 96% 96%  94%   Weight:       Height:   157.5 cm (62\")      Medications   sodium chloride 0.9 % flush 1-10 mL (not administered)   sodium chloride 0.9 % infusion (not administered)   acetaminophen (TYLENOL) tablet 650 mg (not administered)     Or   acetaminophen (TYLENOL) suppository 650 mg (not administered)   fentaNYL citrate (PF) (SUBLIMAZE) injection 25 mcg (not administered)   niCARdipine (CARDENE-IV) 20 mg/200 mL (0.1 mg/mL) in 0.9% NaCl infusion (7.5 mg/hr Intravenous " New Bag 12/9/17 1622)   phenylephrine (ESTEBAN-SYNEPHRINE) 50 mg/250 mL (0.2 mg/mL) in 0.9% NS  infusion (not administered)   potassium chloride (MICRO-K) CR capsule 40 mEq (not administered)     Or   potassium chloride (KLOR-CON) packet 40 mEq (not administered)     Or   potassium chloride 10 mEq in 100 mL IVPB (not administered)   Magnesium Sulfate 2 gram Bolus, followed by 8 gram infusion (total Mg dose 10 grams)- Mg less than or equal to 1mg/dL (not administered)     Or   Magnesium Sulfate 6 gram Infusion (2 gm x 3) -Mg 1.1 -1.5 mg/dL (not administered)     Or   magnesium sulfate 4 gram infusion- Mg 1.6-1.9 mg/dL (not administered)   potassium phosphate 45 mmol in sodium chloride 0.9 % 500 mL infusion (not administered)     Or   potassium phosphate 30 mmol in sodium chloride 0.9 % 250 mL infusion (not administered)     Or   potassium phosphate 15 mmol in sodium chloride 0.9 % 100 mL infusion (not administered)     Or   sodium phosphates 45 mmol in sodium chloride 0.9 % 500 mL IVPB (not administered)     Or   sodium phosphates 30 mmol in sodium chloride 0.9 % 250 mL IVPB (not administered)     Or   sodium phosphates 15 mmol in sodium chloride 0.9 % 250 mL IVPB (not administered)   ondansetron (ZOFRAN) injection 4 mg (not administered)   dextrose (GLUTOSE) oral gel 15 g (not administered)   dextrose (D50W) solution 25 g (not administered)   glucagon (human recombinant) (GLUCAGEN DIAGNOSTIC) injection 1 mg (not administered)   insulin regular (humuLIN R,novoLIN R) injection 0-7 Units (not administered)     ECG/EMG Results (last 24 hours)     ** No results found for the last 24 hours. **                        MDM    Final diagnoses:   Intracranial hematoma, right, without loss of consciousness, initial encounter       Documentation assistance provided by aguila LI.  Information recorded by the scribe was done at my direction and has been verified and validated by me.     Isabelle Li  12/09/17 3523       Juan Miguel GARDNER  "MD Carlota  12/09/17 1628       Electronically signed by Juan Miguel Van MD at 12/9/2017  4:28 PM        Vital Signs (last 72 hrs)       12/08 0700  -  12/09 0659 12/09 0700  -  12/10 0659 12/10 0700  -  12/11 0659 12/11 0700  -  12/11 1134   Most Recent    Temp (°F)   98.3 -  98.9    97.8 -  98.6       97.8 (36.6)    Heart Rate   75 -  (!)125    66 -  (!)158    60 -  113     89    Resp   14 -  18    16 -  22      18     18    BP   94/59 -  (!) 191/110    105/51 -  156/75    117/54 -  140/64     134/58    SpO2 (%)   (!)88 -  97    90 -  98    95 -  98     98          Hospital Medications (all)       Dose Frequency Start End    acetaminophen (TYLENOL) suppository 650 mg 650 mg Every 4 Hours PRN 12/9/2017     Sig - Route: Insert 1 suppository into the rectum Every 4 (Four) Hours As Needed for Mild Pain  or Fever (Temperature greater than or equal to 37 C). - Rectal    Linked Group 1:  \"Or\" Linked Group Details        acetaminophen (TYLENOL) tablet 650 mg 650 mg Every 4 Hours PRN 12/9/2017     Sig - Route: Take 2 tablets by mouth Every 4 (Four) Hours As Needed for Mild Pain  or Fever (Temperature greater than or equal to 37 C). - Oral    Linked Group 1:  \"Or\" Linked Group Details        dexamethasone (DECADRON) injection 8 mg 8 mg Once 12/9/2017 12/9/2017    Sig - Route: Infuse 2 mL into a venous catheter 1 (One) Time. - Intravenous    dextrose (D50W) solution 25 g 25 g Every 15 Minutes PRN 12/9/2017     Sig - Route: Infuse 50 mL into a venous catheter Every 15 (Fifteen) Minutes As Needed for Low Blood Sugar (Blood Sugar Less Than 70, Patient Has IV Access - Unresponsive, NPO or Unable To Safely Swallow). - Intravenous    dextrose (GLUTOSE) oral gel 15 g 15 g Every 15 Minutes PRN 12/9/2017     Sig - Route: Take 15 g by mouth Every 15 (Fifteen) Minutes As Needed for Low Blood Sugar (Blood Sugar Less Than 70, Patient Alert, Is Not NPO & Can Safely Swallow). - Oral    famotidine (PEPCID) injection 20 mg 20 mg Every 12 " "Hours Scheduled 12/9/2017     Sig - Route: Infuse 2 mL into a venous catheter Every 12 (Twelve) Hours. - Intravenous    fentaNYL citrate (PF) (SUBLIMAZE) injection 25 mcg 25 mcg Every 2 Hours PRN 12/9/2017 12/19/2017    Sig - Route: Infuse 0.5 mL into a venous catheter Every 2 (Two) Hours As Needed for Severe Pain . - Intravenous    glucagon (human recombinant) (GLUCAGEN DIAGNOSTIC) injection 1 mg 1 mg Every 15 Minutes PRN 12/9/2017     Sig - Route: Inject 1 mg under the skin Every 15 (Fifteen) Minutes As Needed (Blood Glucose Less Than 70 - Patient Without IV Access - Unresponsive, NPO or Unable To Safely Swallow). - Subcutaneous    insulin regular (humuLIN R,novoLIN R) injection 0-9 Units 0-9 Units Every 6 Hours Scheduled 12/10/2017     Sig - Route: Inject 0-9 Units under the skin Every 6 (Six) Hours. - Subcutaneous    Magnesium Sulfate 2 gram Bolus, followed by 8 gram infusion (total Mg dose 10 grams)- Mg less than or equal to 1mg/dL 2 g As Needed 12/9/2017     Sig - Route: Infuse 50 mL into a venous catheter As Needed (Mg less than or equal to 1mg/dL). - Intravenous    Linked Group 2:  \"Or\" Linked Group Details        magnesium sulfate 4 gram infusion- Mg 1.6-1.9 mg/dL 4 g As Needed 12/9/2017     Sig - Route: Infuse 100 mL into a venous catheter As Needed (Mg 1.6-1.9 mg/dL). - Intravenous    Linked Group 2:  \"Or\" Linked Group Details        Magnesium Sulfate 6 gram Infusion (2 gm x 3) -Mg 1.1 -1.5 mg/dL 2 g As Needed 12/9/2017     Sig - Route: Infuse 50 mL into a venous catheter As Needed (Mg 1.1 -1.5 mg/dL). - Intravenous    Linked Group 2:  \"Or\" Linked Group Details        niCARdipine (CARDENE-IV) 20 mg/200 mL (0.1 mg/mL) in 0.9% NaCl infusion 5-15 mg/hr Titrated 12/9/2017     Sig - Route: Infuse 5-15 mg/hr into a venous catheter Dose Adjusted By Provider As Needed. - Intravenous    ondansetron (ZOFRAN) injection 4 mg 4 mg Every 6 Hours PRN 12/9/2017     Sig - Route: Infuse 2 mL into a venous catheter Every 6 " "(Six) Hours As Needed for Nausea or Vomiting. - Intravenous    phenylephrine (ESTEBAN-SYNEPHRINE) 50 mg/250 mL (0.2 mg/mL) in 0.9% NS  infusion 0.5-3 mcg/kg/min × 68 kg Titrated 12/9/2017     Sig - Route: Infuse  mcg/min into a venous catheter Dose Adjusted By Provider As Needed. - Intravenous    potassium chloride (KLOR-CON) packet 40 mEq 40 mEq As Needed 12/9/2017     Sig - Route: Take 40 mEq by mouth As Needed (potassium replacement, see admin instructions). - Oral    Linked Group 3:  \"Or\" Linked Group Details        potassium chloride (MICRO-K) CR capsule 40 mEq 40 mEq As Needed 12/9/2017     Sig - Route: Take 4 capsules by mouth As Needed (potassium replacement.  see admin instructions). - Oral    Linked Group 3:  \"Or\" Linked Group Details        potassium chloride 10 mEq in 100 mL IVPB 10 mEq Every 1 Hour PRN 12/9/2017     Sig - Route: Infuse 100 mL into a venous catheter Every 1 (One) Hour As Needed (potassium protocol PERIPHERAL - see admin instructions). - Intravenous    Linked Group 3:  \"Or\" Linked Group Details        potassium phosphate 15 mmol in sodium chloride 0.9 % 100 mL infusion 15 mmol As Needed 12/9/2017     Sig - Route: Infuse 15 mmol into a venous catheter As Needed (Peripheral IV - Phosphorus 1.8 - 2.5). - Intravenous    Linked Group 4:  \"Or\" Linked Group Details        potassium phosphate 30 mmol in sodium chloride 0.9 % 250 mL infusion 30 mmol As Needed 12/9/2017     Sig - Route: Infuse 30 mmol into a venous catheter As Needed (Peripheral IV - Phosphorus 1.3 - 1.7). - Intravenous    Linked Group 4:  \"Or\" Linked Group Details        potassium phosphate 45 mmol in sodium chloride 0.9 % 500 mL infusion 45 mmol As Needed 12/9/2017     Sig - Route: Infuse 45 mmol into a venous catheter As Needed (Peripheral IV - Phosphorus Less Than 1.3). - Intravenous    Linked Group 4:  \"Or\" Linked Group Details        sodium chloride 0.9 % flush 1-10 mL 1-10 mL As Needed 12/9/2017     Sig - Route: Infuse " "1-10 mL into a venous catheter As Needed for Line Care. - Intravenous    sodium chloride 0.9 % infusion 75 mL/hr Continuous 12/9/2017     Sig - Route: Infuse 75 mL/hr into a venous catheter Continuous. - Intravenous    sodium phosphates 15 mmol in sodium chloride 0.9 % 250 mL IVPB 15 mmol As Needed 12/9/2017     Sig - Route: Infuse 15 mmol into a venous catheter As Needed (Peripheral IV - Phosphorus 1.8 - 2.5 & Potassium Greater Than 4). - Intravenous    Linked Group 4:  \"Or\" Linked Group Details        sodium phosphates 30 mmol in sodium chloride 0.9 % 250 mL IVPB 30 mmol As Needed 12/9/2017     Sig - Route: Infuse 30 mmol into a venous catheter As Needed (Peripheral IV - Phosphorus 1.3-1.7 & Potassium Greater Than 4). - Intravenous    Linked Group 4:  \"Or\" Linked Group Details        sodium phosphates 45 mmol in sodium chloride 0.9 % 500 mL IVPB 45 mmol As Needed 12/9/2017     Sig - Route: Infuse 45 mmol into a venous catheter As Needed (Peripheral IV - Phosphorus Less Than 1.3 & Potassium Greater Than 4). - Intravenous    Linked Group 4:  \"Or\" Linked Group Details        insulin regular (humuLIN R,novoLIN R) injection 0-7 Units (Discontinued) 0-7 Units Every 6 Hours Scheduled 12/9/2017 12/10/2017    Sig - Route: Inject 0-7 Units under the skin Every 6 (Six) Hours. - Subcutaneous    niCARdipine (CARDENE-IV) 20 mg/200 mL (0.1 mg/mL) in 0.9% NaCl infusion (Discontinued) 5-15 mg/hr Titrated 12/9/2017 12/9/2017    Sig - Route: Infuse 5-15 mg/hr into a venous catheter Dose Adjusted By Provider As Needed. - Intravenous    Reason for Discontinue: Duplicate order    Pharmacy Meds to Bed Consult (Discontinued)  Daily (Monday-Friday) 12/11/2017 12/9/2017    Sig - Route: Daily (Monday-Friday). - Does not apply    Cosign for Ordering: Accepted by Aury Espitia MD on 12/9/2017  4:51 PM    Pharmacy Meds to Bed Consult (Discontinued)  Daily (Monday-Friday) 12/11/2017 12/10/2017    Sig - Route: Daily (Monday-Friday). - " Does not apply    Cosign for Ordering: Required by Aury Espitia MD    sodium chloride 0.9 % flush 10 mL (Discontinued) 10 mL As Needed 12/9/2017 12/9/2017    Sig - Route: Infuse 10 mL into a venous catheter As Needed for Line Care. - Intravenous    Reason for Discontinue: Duplicate order    Cosign for Ordering: Accepted by Juan Miguel Van MD on 12/9/2017  3:36 PM          Lab Results (last 72 hours)     Procedure Component Value Units Date/Time    CBC & Differential [[id number]] Collected:  12/09/17 1539    Specimen:  Blood Updated:  12/09/17 1547    Narrative:       The following orders were created for panel order CBC & Differential.  Procedure                               Abnormality         Status                     ---------                               -----------         ------                     CBC Auto Differential[[id number]]        Abnormal            Final result                 Please view results for these tests on the individual orders.    CBC Auto Differential [[id number]]  (Abnormal) Collected:  12/09/17 1539    Specimen:  Blood Updated:  12/09/17 1547     WBC 7.24 10*3/mm3      RBC 4.17 10*6/mm3      Hemoglobin 11.9 g/dL      Hematocrit 36.6 %      MCV 87.8 fL      MCH 28.5 pg      MCHC 32.5 g/dL      RDW 14.4 %      RDW-SD 46.4 fl      MPV 10.1 fL      Platelets 224 10*3/mm3      Neutrophil % 73.1 (H) %      Lymphocyte % 19.9 (L) %      Monocyte % 6.2 %      Eosinophil % 0.4 %      Basophil % 0.3 %      Immature Grans % 0.1 %      Neutrophils, Absolute 5.29 10*3/mm3      Lymphocytes, Absolute 1.44 10*3/mm3      Monocytes, Absolute 0.45 10*3/mm3      Eosinophils, Absolute 0.03 10*3/mm3      Basophils, Absolute 0.02 10*3/mm3      Immature Grans, Absolute 0.01 10*3/mm3     Comprehensive Metabolic Panel [[id number]]  (Abnormal) Collected:  12/09/17 1539    Specimen:  Blood Updated:  12/09/17 1610     Glucose 166 (H) mg/dL      BUN 11 mg/dL      Creatinine 1.00 mg/dL      Sodium 135 mmol/L       Potassium 3.7 mmol/L      Chloride 102 mmol/L      CO2 27.0 mmol/L      Calcium 8.9 mg/dL      Total Protein 6.5 g/dL      Albumin 3.80 g/dL      ALT (SGPT) 7 U/L      AST (SGOT) 12 U/L      Alkaline Phosphatase 75 U/L      Total Bilirubin 0.6 mg/dL      eGFR Non African Amer 54 (L) mL/min/1.73      eGFR  African Amer 66 mL/min/1.73      Globulin 2.7 gm/dL      A/G Ratio 1.4 (L) g/dL      BUN/Creatinine Ratio 11.0     Anion Gap 6.0 mmol/L     Narrative:       National Kid[employer] Guidelines    Stage     Description        GFR  1         Normal or High     90+  2         Mild decrease      60-89  3         Moderate decrease  30-59  4         Severe decrease    15-29  5         Kidney failure     <15    Lipase [[id number]]  (Normal) Collected:  12/09/17 1539    Specimen:  Blood Updated:  12/09/17 1610     Lipase 22 U/L     Troponin [[id number]]  (Normal) Collected:  12/09/17 1539    Specimen:  Blood Updated:  12/09/17 1614     Troponin I 0.018 ng/mL     Protime-INR [[id number]] Collected:  12/09/17 1539    Specimen:  Blood Updated:  12/09/17 1620     Protime 11.2 Seconds      INR 1.03    Narrative:       Therapeutic Ranges for INR: 2.0-3.0 (PT 20-30)                              2.5-3.5 (PT 25-34)    aPTT [[id number]]  (Normal) Collected:  12/09/17 1539    Specimen:  Blood Updated:  12/09/17 1620     PTT 24.0 seconds     Narrative:       PTT = The equivalent PTT values for the therapeutic range of heparin levels at 0.3 to 0.5 U/ml are 45 to 60 seconds.    Lavender Top [[id number]] Collected:  12/09/17 1539    Specimen:  Blood Updated:  12/09/17 1646     Extra Tube hold for add-on      Auto resulted       Gold Top - SST [[id number]] Collected:  12/09/17 1539    Specimen:  Blood Updated:  12/09/17 1646     Extra Tube Hold for add-ons.      Auto resulted.       [place] Draw [[id number]] Collected:  12/09/17 1539    Specimen:  Blood Updated:  12/09/17 1646    Narrative:       The following orders were created for panel  order [place] Draw.  Procedure                               Abnormality         Status                     ---------                               -----------         ------                     Light Blue Top[[id number]]                                   Final result               Green Top (Gel)[[id number]]                                  Final result               Lavender Top[[id number]]                                     Final result               Gold Top - SST[[id number]]                                   Final result               Green Top (No Gel)[[id number]]                                                            Please view results for these tests on the individual orders.    Light Blue Top [[id number]] Collected:  12/09/17 1539    Specimen:  Blood Updated:  12/09/17 1646     Extra Tube hold for add-on      Auto resulted       Green Top (Gel) [[id number]] Collected:  12/09/17 1539    Specimen:  Blood Updated:  12/09/17 1646     Extra Tube Hold for add-ons.      Auto resulted.       POC Glucose Fingerstick [[id number]]  (Abnormal) Collected:  12/09/17 2350    Specimen:  Blood Updated:  12/09/17 2352     Glucose 245 (H) mg/dL     Narrative:       Treated Patient Meter: [id number] : [id number] Charlotte Radha    POC Glucose Fingerstick [[id number]]  (Abnormal) Collected:  12/10/17 0555    Specimen:  Blood Updated:  12/10/17 0557     Glucose 233 (H) mg/dL     Narrative:       Treated Patient Meter: [id number] : [id number] Charlotte Radha    Hemoglobin A1c [[id number]]  (Abnormal) Collected:  12/10/17 0542    Specimen:  Blood Updated:  12/10/17 0635     Hemoglobin A1C 7.00 (H) %     Narrative:       The American Diabetes Association recommends maintenance of Hemoglobin A1C at 7.0% or lower. Goals for Hemoglobin A1C reduction may need to be modified if hypoglycemia is a problem.    Lipid Panel [[id number]]  (Normal) Collected:  12/10/17 0542    Specimen:  Blood Updated:  12/10/17 0636     Total Cholesterol 172  mg/dL      Triglycerides 68 mg/dL      HDL Cholesterol 54 mg/dL      LDL Cholesterol  113 mg/dL     Narrative:       Cholesterol Reference Ranges:   Desirable       < 200 mg/dL   Borderline    200-239 mg/dL   High Risk       > 239 mg/dL    Triglyceride Reference Ranges:   Normal          < 150 mg/dL   Borderline    150-199 mg/dL   High          200-499 mg/dL   Very High       > 499 mg/dL    HDL Reference Ranges:   Low              < 40 mg/dL   High             > 59 mg/dL    LDL Reference Ranges:   Optimal         < 100 mg/dL   Near Optimal  100-129 mg/dL   Borderline    130-159 mg/dL   High          160-189 mg/dL   Very High       > 189 mg/dL    CBC (No Diff) [[id number]]  (Normal) Collected:  12/10/17 0542    Specimen:  Blood Updated:  12/10/17 0829     WBC 5.37 10*3/mm3      RBC 4.20 10*6/mm3      Hemoglobin 12.2 g/dL      Hematocrit 36.3 %      MCV 86.4 fL      MCH 29.0 pg      MCHC 33.6 g/dL      RDW 14.3 %      RDW-SD 44.9 fl      MPV 10.4 fL      Platelets 246 10*3/mm3     POC Glucose Fingerstick [[id number]]  (Abnormal) Collected:  12/10/17 1208    Specimen:  Blood Updated:  12/10/17 1222     Glucose 214 (H) mg/dL     Narrative:       Meter: [id number] : [id number] Ann Bakerly    POC Glucose Fingerstick [[id number]]  (Abnormal) Collected:  12/10/17 1717    Specimen:  Blood Updated:  12/10/17 1720     Glucose 204 (H) mg/dL     Narrative:       Meter: [id number] : [id number] Ann Nyla    POC Glucose Fingerstick [[id number]]  (Abnormal) Collected:  12/10/17 2343    Specimen:  Blood Updated:  12/10/17 2344     Glucose 186 (H) mg/dL     Narrative:       Confirmed by Repeat Meter: [id number] : [id number] Cindy Pérez    POC Glucose Fingerstick [[id number]]  (Abnormal) Collected:  12/11/17 0525    Specimen:  Blood Updated:  12/11/17 0526     Glucose 172 (H) mg/dL     Narrative:       Confirmed by Repeat Meter: [id number] : [id number] Cindy Pérez    Phosphorus [[id number]]  (Normal)  Collected:  12/11/17 0405    Specimen:  Blood Updated:  12/11/17 0547     Phosphorus 3.4 mg/dL     Magnesium [[id number]]  (Normal) Collected:  12/11/17 0405    Specimen:  Blood Updated:  12/11/17 0547     Magnesium 2.0 mg/dL     Basic Metabolic Panel [[id number]]  (Abnormal) Collected:  12/11/17 0405    Specimen:  Blood Updated:  12/11/17 0617     Glucose 172 (H) mg/dL      BUN 21 mg/dL      Creatinine 1.00 mg/dL      Sodium 138 mmol/L      Potassium 4.0 mmol/L      Chloride 108 mmol/L      CO2 22.0 mmol/L      Calcium 8.4 (L) mg/dL      eGFR Non African Amer 54 (L) mL/min/1.73      BUN/Creatinine Ratio 21.0     Anion Gap 8.0 mmol/L     Narrative:       National Kidney Foundation Guidelines    Stage     Description        GFR  1         Normal or High     90+  2         Mild decrease      60-89  3         Moderate decrease  30-59  4         Severe decrease    15-29  5         Kidney failure     <15    CBC (No Diff) [[id number]]  (Abnormal) Collected:  12/11/17 0405    Specimen:  Blood Updated:  12/11/17 0650     WBC 10.60 10*3/mm3      RBC 3.97 10*6/mm3      Hemoglobin 11.2 (L) g/dL      Hematocrit 34.8 %      MCV 87.7 fL      MCH 28.2 pg      MCHC 32.2 g/dL      RDW 14.7 (H) %      RDW-SD 47.3 fl      MPV 10.3 fL      Platelets 209 10*3/mm3           Imaging Results (last 72 hours)     Procedure Component Value Units Date/Time    CT Head Without Contrast Stroke Protocol [[id number]] Collected:  12/09/17 1531     Updated:  12/09/17 1546    Narrative:       EXAMINATION: CT HEAD WO CONTRAST STROKE PROTOCOL-      INDICATION: Stroke Protocol (onset > 12 hrs)         TECHNIQUE:      CT data set of the brain was performed without intravenous contrast.     The radiation dose reduction device was turned on for each scan per the  ALARA (As Low as Reasonably Achievable) protocol.     COMPARISON: NONE     FINDINGS:   1.Intra-axial dominant right cerebral hemorrhage is seen in the right  frontal parietal region with a rounded  lobular configuration of high  attenuation intra-axial blood surrounded by edema and creating mass  effect. The intra-axial bleed measures 3.7 x 3.8 cm. Volumetric  measurement is 12.3 cubic centimeters.  2. A second intra-axial hemorrhage is identified in the right occipital  area. This could represent a calcified meningioma along the posterior  right falx but a small petechial hemorrhage cannot be excluded and this  measures 1.4 cm.  3. There is evidence of a subarachnoid component over the right  convexity with a small petechial hemorrhages at the right convexity.  There is mass effect with edema and there is a slight midline shift from  right to left.             Impression:          Intra-axial cerebral bleed, right frontal parietal, with surrounding  edema and mass effect. Measurements and volumetric measurements are  offered above.     There is a 14 mm high attenuation structure adjacent to the posterior  right falx cerebri which could be a densely calcified meningioma or  second hemorrhage.     There is a small area of subarachnoid bleed along the right parietal  convexity in the sulcal GYRAL recesses.     There is considerable mass effect in the right hemisphere.              This report was finalized on 12/9/2017 3:44 PM by Dr. Jaxon Martins MD.       XR Chest 1 View [[id number]] Collected:  12/09/17 1525     Updated:  12/09/17 1659    Narrative:       EXAM:    XR Chest, 1 View    CLINICAL HISTORY:    77 years old, female; Traumatic hemorrhage of right cerebrum without loss of   consciousness, initial encounter; Signs and symptoms; Other: Stroke protocol;   Additional info: Stroke protocol (onset > 12 hrs)    TECHNIQUE:    Frontal view of the chest.    COMPARISON:    No relevant prior studies available.    FINDINGS:    Lungs:  The lungs are clear.    Pleural space:  Unremarkable.  No pneumothorax.    Heart:  Unremarkable.    Mediastinum:  Unremarkable.    Bones/joints:  Unremarkable.      Impression:          No acute findings.    THIS DOCUMENT HAS BEEN ELECTRONICALLY SIGNED BY AMISHA JACKSON MD    CT Head Without Contrast [[id number]] Collected:  12/10/17 1253     Updated:  12/10/17 1312    Narrative:       EXAMINATION: CT HEAD WO CONTRAST - 12/10/2017      INDICATION: S06.340A-Traumatic hemorrhage of right cerebrum without loss  of consciousness, initial encounter.     TECHNIQUE: CT datasets of the brain were performed without intravenous  contrast.     The radiation dose reduction device was turned on for each scan per the  ALARA (As Low as Reasonably Achievable) protocol.     COMPARISON:  Compared to previous datasets of yesterday.     FINDINGS:   1. There is a large intra-axial right temporal frontal hemorrhage  surrounded by significant edema and local mass effect.     A small abnormal area of increased attenuation is seen rightward of the  posterior falx. There is subarachnoid bleed over the convexity.     2. New hemorrhage, increasing mass effect, or evidence of high-grade  extracerebral collection is not identified.     3. The local edema around the right intra-axial dominant clot is stable.       Impression:       No interval change is noted in the intra-axial and  extracerebral bleed complex described above. There is no evidence of  progressive hemorrhage, progressive edema or progressive mass effect.     DICTATED:     12/10/2017  EDITED:          12/10/2017        This report was finalized on 12/10/2017 1:09 PM by Dr. Jaxon Martins MD.                Physician Progress Notes (last 72 hours) (Notes from 12/8/2017 11:35 AM through 12/11/2017 11:35 AM)      Leo Proctor MD at 12/10/2017  8:22 AM  Version 1 of 1         NEUROSURGERY PROGRESS NOTE     LOS: 1 day   Patient Care Team:  No Known Provider as PCP - General    Chief Complaint: Left-sided weakness.    Interval History:   Patient Complaints: Mild headache.  Patient Denies: Nausea or vomiting.    Review of Systems:   Musculoskeletal and Neurological  "systems were reviewed and are negative except for:  Neurological: positive for headaches and weakness    Vital Signs: Blood pressure 105/51, pulse 101, temperature 98.9 °F (37.2 °C), temperature source Oral, resp. rate 14, height 157.5 cm (62\"), weight 68 kg (150 lb), SpO2 95 %.  Intake/Output:   Intake/Output Summary (Last 24 hours) at 12/10/17 0822  Last data filed at 12/10/17 0600   Gross per 24 hour   Intake           2009.3 ml   Output              250 ml   Net           1759.3 ml       Physical Exam:  The patient is awake, alert, and sitting up in bed.  She continues to have right eye deviation.  There is no neglect of her left side.  She harbors a left hemiparesis involving the face, arm, and leg.  Extremity strength on the left is at least 3/5.     Data Review:    CT scan of the brain from this morning is unchanged from the study on the prior day.  There is very modest right to left shift.  There is significant diffuse atrophy.    Assessment/Plan:  1.  Multiple areas of intracranial hemorrhage with the largest being a right frontal ICH.  Multiple areas of hemorrhage potentially do to hemorrhagic conversion of embolic infarcts.  Carotid duplex is pending.  Cardiac echo demonstrated atrial fibrillation.  2.  Atrial fibrillation.  3.  Hypertension.  3.  Disposition: Continue supportive care and observation.      Leo Proctor MD  12/10/17  8:22 AM         Electronically signed by Leo Proctor MD at 12/10/2017  8:31 AM      Aury Epsitia MD at 12/10/2017  2:29 PM  Version 1 of 1         Critical Care Note     LOS: 1 day   Patient Care Team:  No Known Provider as PCP - General    Chief Complaint/Reason for visit:    Chief Complaint   Patient presents with   • Stroke       Subjective     77-year-old woman presenting with sided weakness, right frontal lobe hemorrhage with edema. She had a preceding episode of transient facial weakness earlier this week.  CT scan revealed a 3.7 x 3.8 cm frontal " "hemorrhage with some right to left shift and a smaller posterior and superior hemorrhage.    Interval History:     Today her NIH stroke scale is 11, decreased from a 15. She did walk with physical therapy. She failed her speech swallow evaluation.    Review of Systems:    All systems were reviewed and negative except as noted in subjective.    Medical history, surgical history, social history, family history reviewed    Objective     Intake/Output:    Intake/Output Summary (Last 24 hours) at 12/10/17 1429  Last data filed at 12/10/17 0600   Gross per 24 hour   Intake           2009.3 ml   Output              250 ml   Net           1759.3 ml       Nutrition:  NPO Diet    Infusions:    niCARdipine 5-15 mg/hr Last Rate: 5 mg/hr (12/10/17 0819)   phenylephrine 0.5-3 mcg/kg/min    sodium chloride 75 mL/hr Last Rate: 75 mL/hr (12/10/17 0531)         Telemetry:  Sinus Rhythm: sinus tachycardia          Vital Signs  Blood pressure 129/58, pulse 105, temperature 98.5 °F (36.9 °C), temperature source Oral, resp. rate 18, height 157.5 cm (62\"), weight 68 kg (150 lb), SpO2 94 %.    Physical Exam:  General Appearance:  Elderly white woman, sleeping in the chair    Head:  Normocephalic, atraumatic    Eyes:             Ears:     Throat:    Neck: No carotid bruit    Back:      Lungs:   Breath sounds are bilateral, equal, clear     Heart:  Regular, S1, S2, frequent early beats, SERGEI   Abdomen:   Soft, nondistended    Rectal:   Deferred   Extremities: No pitting edema    Pulses:    Skin:    Lymph nodes:    Neurologic: Sleeping in a chair, facial droop present, left       Results Review:     I reviewed the patient's new clinical results.     Results from last 7 days  Lab Units 12/09/17  1539   SODIUM mmol/L 135   POTASSIUM mmol/L 3.7   CHLORIDE mmol/L 102   CO2 mmol/L 27.0   BUN mg/dL 11   CREATININE mg/dL 1.00   CALCIUM mg/dL 8.9   BILIRUBIN mg/dL 0.6   ALK PHOS U/L 75   ALT (SGPT) U/L 7   AST (SGOT) U/L 12   GLUCOSE mg/dL 166* "       Results from last 7 days  Lab Units 12/10/17  0542 12/09/17  1539   WBC 10*3/mm3 5.37 7.24   HEMOGLOBIN g/dL 12.2 11.9   HEMATOCRIT % 36.3 36.6   PLATELETS 10*3/mm3 246 224         No results found for: BLOODCX  No results found for: URINECX    I reviewed the patient's new imaging including images and reports.  FINDINGS:   1. There is a large intra-axial right temporal frontal hemorrhage  surrounded by significant edema and local mass effect.      A small abnormal area of increased attenuation is seen rightward of the  posterior falx. There is subarachnoid bleed over the convexity.      2. New hemorrhage, increasing mass effect, or evidence of high-grade  extracerebral collection is not identified.      3. The local edema around the right intra-axial dominant clot is stable.      IMPRESSION:  No interval change is noted in the intra-axial and  extracerebral bleed complex described above. There is no evidence of  progressive hemorrhage, progressive edema or progressive mass effect.      DICTATED:     12/10/2017  Interpretation Summary      · Left ventricular systolic function is normal. Estimated EF = 70%.  · Normal right ventricular cavity size, wall thickness, systolic function and septal motion noted.  · Saline test results are negative.  · The aortic valve exhibits sclerosis.  · No evidence of pulmonary hypertension is present.  · There is no evidence of pericardial effusion.       All medications reviewed.     famotidine 20 mg Intravenous Q12H   insulin regular 0-7 Units Subcutaneous Q6H   [START ON 12/11/2017] Pharmacy Meds to Bed Consult  Does not apply Daily         Assessment/Plan     Principal Problem:    Intracranial hemorrhage  Active Problems:    Essential hypertension  Diabetes Mellitus    77-year-old woman presenting with left facial droop and left-sided weakness. She has left-sided neglect and eyes are deviated to the right. She failed her swallow evaluation today. She was able to walk with  "physical therapy. She had a substantial area of hemorrhage in the right frontal region and smaller areas in the occiput and along the posterior falx.  Repeat CT scan today was unchanged but still impressive.  She said several irregular heartbeats but remains in sinus rhythm with PACs. She remains on low-dose Cardene for blood pressure management.  Blood sugars have been in the 200s today, likely from the 1 dose of Decadron.      PLAN:  If she fails her fees tomorrow will place a nasogastric tube and initiate feedings, oral blood pressure medication    Occupational therapy, physical therapy, speech therapy    Monitor stroke scale, if decline repeat CT scan    Increase insulin        VTE Prophylaxis:SCDS    Stress Ulcer Prophylaxis:kendrick Espitia MD  12/10/17  2:29 PM      Time: 25min  I personally provided care to this critically ill patient as documented above.  Critical care time does not include time spent on separately billed procedures.  Non of my critical care time was concurrent with other critical care providers.      Electronically signed by Aury Espitia MD at 12/10/2017  2:40 PM      Leo Proctor MD at 12/11/2017  6:38 AM  Version 1 of 1         NEUROSURGERY PROGRESS NOTE     LOS: 2 days   Patient Care Team:  No Known Provider as PCP - General    Chief Complaint: Left sided weakness and neglect.    Interval History:   Patient Complaints: None.  Patient Denies: Headache, nausea, or vomiting.    Review of Systems:   Musculoskeletal and Neurological systems were reviewed and are negative except for:  Neurological: positive for weakness    Vital Signs: Blood pressure 128/64, pulse 80, temperature 97.8 °F (36.6 °C), temperature source Oral, resp. rate 16, height 157.5 cm (62\"), weight 68 kg (150 lb), SpO2 96 %.  Intake/Output:   Intake/Output Summary (Last 24 hours) at 12/11/17 6348  Last data filed at 12/11/17 1012   Gross per 24 hour   Intake             2010 ml   Output          " "    625 ml   Net             1385 ml       Physical Exam:  The patient is awake and alert.  She follow simple commands.  Her speech is fluent.  She is oriented to \"February\" of 2017.  She understands the circumstances of her admission.  She continues to have right eye deviation but her gaze will now move to the midline which is improved.  Left hemiparesis.  Left-sided neglect.     Data Review:    No significant stenosis noted on her carotid duplex study.    Assessment/Plan:  1.  Multiple areas of intracranial hemorrhage with the largest being a right frontal ICH.  Multiple areas of hemorrhage potentially do to hemorrhagic conversion of embolic infarcts.  Carotid duplex is pending.  Cardiac echo demonstrated atrial fibrillation.  2.  Atrial fibrillation.  3.  Hypertension.  3.  Disposition: Continue supportive care and observation.      Leo Proctor MD  12/11/17  6:38 AM         Electronically signed by Leo Proctor MD at 12/11/2017  6:45 AM           Consult Notes (last 72 hours) (Notes from 12/8/2017 11:35 AM through 12/11/2017 11:35 AM)      Leo Proctor MD at 12/9/2017  6:43 PM  Version 1 of 1     Consult Orders:    1. Inpatient Consult to Neurosurgery [[id number]] ordered by Aury Espitia MD at 12/09/17 1614                NEUROSURGERY CONSULTATION    Referring Provider: Juan Miguel Van MD    Patient Care Team:  No Known Provider as PCP - General    Chief Complaint: Left-sided weakness.    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  Several days ago she apparently developed some mouth drooping which resolved.  She did talk with family members last evening and everything was in order.  Today she did not answer and she was found to be in bed with left sided weakness and mumbling speech.  She was brought to the emergency room where her blood pressure was noted to be elevated.  She does have a history of hypertension.  She " "is not on blood thinners.      Review of Systems:  Musculoskeletal and Neurological systems were reviewed and are negative except for:  Neurological: positive for headaches and weakness    History:  Past Medical History:   Diagnosis Date   • Dementia    • Diabetes mellitus    • GERD (gastroesophageal reflux disease)    • Hypertension    • Rheumatoid arthritis    • TIA (transient ischemic attack)    , Past Surgical History:   Procedure Laterality Date   • CATARACT EXTRACTION, BILATERAL     • EYE SURGERY     , Family History   Problem Relation Age of Onset   • Family history unknown: Yes   , Social History   Substance Use Topics   • Smoking status: Never Smoker   • Smokeless tobacco: None   • Alcohol use No   ,   (Not in a hospital admission) and Allergies:  Penicillins      Physical Exam:  Vital Signs: Blood pressure 138/73, pulse 106, temperature 98.7 °F (37.1 °C), temperature source Oral, resp. rate 18, height 157.5 cm (62\"), weight 68 kg (150 lb), SpO2 96 %.   There is no overt cranial trauma.  There is no raccoon's sign or garcaí sign.  MUSCULOSKELETAL:  Neck tenderness to palpation is not observed.  Neck is supple.  ROM in neck is normal.  NEUROLOGICAL:  Strength is intact on her right side.  On the left side her strength is 3-4 minus/5.  Muscle tone is normal throughout.  Sensation is diminished on the left side.  There is left-sided neglect.  Deep tendon reflexes are 1+ and symmetrical.  Maritza's Sign is negative bilaterally.   CRANIAL NERVES:  Cranial Nerve II:  Visual fields are full to confrontation.  Cranial Nerve III, IV, and VI: PERRLADC.  Right eye deviation is noted.  Nystagmus is not present.  Cranial Nerve V: Facial sensation is intact to light touch.  Cranial Nerve VII: Muscles of facial expression demonstate left facial weakness.  Cranial Nerve VIII: Hearing is intact to finger rub bilaterally.  Cranial Nerve IX and X: Palate elevates symmetrically.  Cranial Nerve XI: Shoulder shrug is intact " bilaterally.  Cranial Nerve XII: Tongue is midline without evidence of atrophy or fasciculation.      Data Review:  CT of the brain demonstrates diffuse atrophy.  There are multiple areas of hemorrhage with the largest being in the right frontal region.  This was measured by the radiologist to be 3.7 x 3.8 cm.  Volume was said to be 12.3 mL's.  There is modest right to left shift.  There is a smaller area of hemorrhage posterior and superior to the ICH noted above.  A third area of hemorrhage is noted just right of midline in the occipital region.    Diagnosis:  Multiple areas of hemorrhage potentially secondary to hemorrhagic embolic infarction.    Treatment Recommendations:  The patient is to be admitted to the intensive care unit for observation and monitoring.  Follow-up CT will be performed in the morning.  If the large area of hemorrhage increases in size or she develops significant swelling then craniotomy will be a consideration.      Leo Proctor MD  12/09/17  6:43 PM             Electronically signed by Leo Proctor MD at 12/9/2017  6:56 PM

## 2017-12-11 NOTE — THERAPY TREATMENT NOTE
Acute Care - Physical Therapy Treatment Note  [facility]     Patient Name: Lois Brennan  : 1940  MRN: [id number]  Today's Date: 2017  Onset of Illness/Injury or Date of Surgery Date: 17  Date of Referral to PT: 17  Referring Physician: Dr. Espitia    Admit Date: 2017    Visit Dx:    ICD-10-CM ICD-9-CM   1. Intracranial hematoma, right, without loss of consciousness, initial encounter S06.340A 853.01   2. Impaired mobility and ADLs Z74.09 799.89   3. Impaired functional mobility, balance, gait, and endurance Z74.09 V49.89     Patient Active Problem List   Diagnosis   • Intracranial hemorrhage   • Essential hypertension   • Type 2 diabetes mellitus with complication, without long-term current use of insulin               Adult Rehabilitation Note       17 0819          Rehab Assessment/Intervention    Discipline physical therapist  -      Document Type therapy note (daily note)  -MJ      Subjective Information agree to therapy;complains of;pain   R shoulder  -MJ      Patient Effort, Rehab Treatment good  -MJ      Symptoms Noted During/After Treatment significant change in vital signs   elevated HR  -MJ      Precautions/Limitations fall precautions;other (see comments)   L neglect  -MJ      Recorded by [MJ] Hamida De Paz, PT      Vital Signs    Pre Systolic BP Rehab 117  -MJ      Pre Treatment Diastolic BP 54  -MJ      Post Systolic BP Rehab 140  -MJ      Post Treatment Diastolic BP 64  -MJ      Pretreatment Heart Rate (beats/min) 109  -MJ      Intratreatment Heart Rate (beats/min) 153  -MJ      Posttreatment Heart Rate (beats/min) 110  -MJ      Pre SpO2 (%) 97  -MJ      O2 Delivery Pre Treatment room air  -MJ      Post SpO2 (%) 97  -MJ      O2 Delivery Post Treatment room air  -MJ      Pre Patient Position Sitting  -MJ      Intra Patient Position Standing  -MJ      Post Patient Position Sitting  -MJ      Recorded by [MJ] Hamida De Paz, PT      Pain Assessment    Pain  Assessment 0-10  -MJ      Pain Score 5  -MJ      Post Pain Score 5  -MJ      Pain Location Shoulder   Right  -MJ      Pain Intervention(s) Ambulation/increased activity;Repositioned  -MJ      Response to Interventions tolerated  -MJ      Recorded by [MJ] Hamida De Paz, PT      Vision Assessment/Intervention    Visual Impairment decreased tracking across midline;decreased visual tracking   decreased tracking to L  -MJ      Recorded by [MJ] Hamida De Paz PT      Cognitive Assessment/Intervention    Current Cognitive/Communication Assessment impaired  -MJ      Orientation Status oriented to;person;place;required verbal cueing (specifiy in comments);disoriented to;time   VCs for name of hospital  -MJ      Follows Commands/Answers Questions able to follow single-step instructions;50% of the time;needs cueing;needs increased time;needs repetition  -      Personal Safety moderate impairment;decreased awareness, need for assist;decreased awareness, need for safety  -MJ      Personal Safety Interventions fall prevention program maintained;gait belt;muscle strengthening facilitated;nonskid shoes/slippers when out of bed  -MJ      Recorded by [MJ] Hamida De Paz, PT      Bed Mobility, Assessment/Treatment    Bed Mob, Supine to Sit, [place] not tested  -      Bed Mobility, Comment UIC  -MJ      Recorded by [MJ] Hamida De Paz PT      Transfer Assessment/Treatment    Transfers, Sit-Stand [place] moderate assist (50% patient effort)   initially with posterior lean; verbal & tactile cues needed  -      Transfers, Stand-Sit [place] moderate assist (50% patient effort)  -      Transfers, Sit-Stand-Sit, Assist Device rolling walker   gait belt  -      Transfer, Comment attempted side steps to L for pivot to C but unable to initiate side steps completing forward steps.  Commode brought behind patient  -MJ      Recorded by [MJ] Hamida De Paz, PT      Gait Assessment/Treatment    Gait, [place]  Level moderate assist (50% patient effort);1 person + 1 person to manage equipment   chair follow  -MJ      Gait, Assistive Device rolling walker  -MJ      Gait, Distance (Feet) 15  -MJ      Gait, Gait Pattern Analysis swing-to gait  -MJ      Gait, Gait Deviations bhupinder decreased;double stance time increased;decreased heel strike  -MJ      Gait, Safety Issues balance decreased during turns;sequencing ability decreased;step length decreased;weight-shifting ability decreased  -MJ      Gait, Impairments strength decreased;impaired balance  -MJ      Gait, Comment elevated HR limited distance  -MJ      Recorded by [MJ] Hamida De Paz PT      Balance Skills Training    Sitting-Level of Assistance Contact guard  -MJ      Sitting-Balance Support Feet supported;Right upper extremity supported  -      Standing-Level of Assistance Moderate assistance  -      Static Standing Balance Support assistive device  -      Standing-Balance Activities Weight Shift R-L;Weight Shift A-P  -MJ      Gait Balance-Level of Assistance Moderate assistance  -MJ      Gait Balance Support assistive device  -MJ      Recorded by [MJ] Hamida De Paz PT      Therapy Exercises    Bilateral Lower Extremities sitting;AROM:;10 reps;ankle pumps/circles;LAQ  -MJ      Right Upper Extremity sitting;AROM:;10 reps;elbow flexion/extension;shoulder extension/flexion  -MJ      Left Upper Extremity sitting;AAROM:;elbow flexion/extension;shoulder extension/flexion  -MJ      Recorded by [MJ] Hamida De Paz PT      Positioning and Restraints    Pre-Treatment Position sitting in chair/recliner  -      Post Treatment Position chair  -MJ      In Chair notified nsg;sitting;call light within reach;encouraged to call for assist;exit alarm on;LUE elevated;legs elevated;heels elevated  -MJ      Recorded by [MJ] Hamida De Paz PT        User Key  (r) = Recorded By, (t) = Taken By, (c) = Cosigned By    Initials Name Effective Dates     Hamida De Paz, PT  10/30/17 -                 IP PT Goals       12/11/17 0908 12/10/17 1021       Transfer Training PT LTG    Transfer Training PT LTG, Date Established  12/10/17  -SJ     Transfer Training PT LTG, Time to Achieve  2 wks  -SJ     Transfer Training PT LTG, Activity Type  bed to chair /chair to bed;sit to stand/stand to sit  -SJ     Transfer Training PT LTG, [place] Level  contact guard assist  -SJ     Transfer Training PT LTG, Outcome goal ongoing  -MJ goal ongoing  -SJ     Gait Training PT LTG    Gait Training Goal PT LTG, Date Established  12/10/17  -SJ     Gait Training Goal PT LTG, Time to Achieve  2 wks  -SJ     Gait Training Goal PT LTG, [place] Level  minimum assist (75% patient effort);2 person assist required  -SJ     Gait Training Goal PT LTG, Distance to Achieve  100  -SJ     Gait Training Goal PT LTG, Outcome goal ongoing  -MJ goal ongoing  -SJ     Static Sitting Balance PT LTG    Static Sitting Balance PT LTG, Date Established  12/10/17  -SJ     Static Sitting Balance PT LTG, Time to Achieve  2 wks  -SJ     Static Sitting Balance PT LTG, [place] Level  supervision required  -SJ     Static Sitting Balance PT LTG, Assist Device  UE Support  -SJ     Static Sitting Balance PT LTG, Outcome goal ongoing  -MJ goal ongoing  -SJ       User Key  (r) = Recorded By, (t) = Taken By, (c) = Cosigned By    Initials Name Provider Type    QUE Arboleda, PT Physical Therapist    SABI De Paz, PT Physical Therapist          Physical Therapy Education     Title: PT OT SLP Therapies (Active)     Topic: Physical Therapy (Active)     Point: Mobility training (Active)    Learning Progress Summary    Learner Readiness Method Response Comment Documented by Status   Patient Acceptance E NR  MJ 12/11/17 0908 Active    Acceptance E NR  SJ 12/10/17 1025 Active               Point: Home exercise program (Active)    Learning Progress Summary    Learner Readiness Method Response Comment Documented by Status    Patient Acceptance E NR   12/11/17 0908 Active    Acceptance E NR   12/10/17 1025 Active               Point: Body mechanics (Active)    Learning Progress Summary    Learner Readiness Method Response Comment Documented by Status   Patient Acceptance E NR   12/11/17 0908 Active    Acceptance E NR   12/10/17 1025 Active               Point: Precautions (Active)    Learning Progress Summary    Learner Readiness Method Response Comment Documented by Status   Patient Acceptance E NR   12/11/17 0908 Active    Acceptance E NR   12/10/17 1025 Active                      User Key     Initials Effective Dates Name Provider Type Discipline     06/19/15 -  Yumiko Arboleda, PT Physical Therapist PT     10/30/17 -  Hamida De Paz, PT Physical Therapist PT                    PT Recommendation and Plan  Anticipated Equipment Needs At Discharge: other (see comments) (TBD)  Anticipated Discharge Disposition: inpatient rehabilitation facility  Planned Therapy Interventions: balance training, bed mobility training, gait training, home exercise program, neuromuscular re-education, patient/family education, strengthening, transfer training  PT Frequency: daily, per priority policy  Plan of Care Review  Plan Of Care Reviewed With: patient  Progress: progress toward functional goals as expected  Outcome Summary/Follow up Plan: Pt. demonstrates L lateral lean while seated in chair.  During sit->stand, initially demonstrates posterior lean requiring A and VC's to correct.  Pt. unable to demonstrate L side steps to complete pivot today.  Amb distance limited by increased HR.          Outcome Measures       12/11/17 0819 12/10/17 0931 12/10/17 0930    How much help from another person do you currently need...    Turning from your back to your side while in flat bed without using bedrails? 2  -MJ 2  -SJ     Moving from lying on back to sitting on the side of a flat bed without bedrails? 2  -MJ 2  -SJ     Moving to and from a  bed to a chair (including a wheelchair)? 2  -MJ 2  -SJ     Standing up from a chair using your arms (e.g., wheelchair, bedside chair)? 2  -MJ 2  -SJ     Climbing 3-5 steps with a railing? 1  -MJ 1  -SJ     To walk in hospital room? 2  -MJ 2  -SJ     AM-PAC 6 Clicks Score 11  -MJ 11  -SJ     How much help from another is currently needed...    Putting on and taking off regular lower body clothing?   1  -TA    Bathing (including washing, rinsing, and drying)   2  -TA    Toileting (which includes using toilet bed pan or urinal)   1  -TA    Putting on and taking off regular upper body clothing   1  -TA    Eating meals   1  -TA    Modified Stanton Scale    Modified Plummer Scale 4 - Moderately severe disability.  Unable to walk without assistance, and unable to attend to own bodily needs without assistance.  -MJ 4 - Moderately severe disability.  Unable to walk without assistance, and unable to attend to own bodily needs without assistance.  -SJ 4 - Moderately severe disability.  Unable to walk without assistance, and unable to attend to own bodily needs without assistance.  -TA    Functional Assessment    Outcome Measure Options AM-PAC 6 Clicks Basic Mobility (PT);Modified Plummer  -MJ AM-PAC 6 Clicks Basic Mobility (PT);Modified Stanton  -SJ AM-PAC 6 Clicks Daily Activity (OT);Modified Stanton  -TA      User Key  (r) = Recorded By, (t) = Taken By, (c) = Cosigned By    Initials Name Provider Type    QUE Arboleda, PT Physical Therapist    BECCA Ramos, OT Occupational Therapist    SABI De Paz, PT Physical Therapist           Time Calculation:         PT Charges       12/11/17 0912          Time Calculation    Start Time 0819  -MJ      PT Received On 12/11/17  -      PT Goal Re-Cert Due Date 12/20/17  -      Time Calculation- PT    Total Timed Code Minutes- PT 31 minute(s)  -        User Key  (r) = Recorded By, (t) = Taken By, (c) = Cosigned By    Initials Name Provider Type    SABI De Paz,  PT Physical Therapist          Therapy Charges for Today     Code Description Service Date Service Provider Modifiers Qty    38790073806 HC PT THER PROC EA 15 MIN 12/11/2017 Hamida De Paz, PT GP 2    89158615820 HC PT THER SUPP EA 15 MIN 12/11/2017 Hamida De Paz, PT GP 2          PT G-Codes  Outcome Measure Options: AM-PAC 6 Clicks Basic Mobility (PT), Modified Stanton De Paz, PT  12/11/2017

## 2017-12-11 NOTE — PROGRESS NOTES
"NEUROSURGERY PROGRESS NOTE     LOS: 2 days   Patient Care Team:  No Known Provider as PCP - General    Chief Complaint: Left sided weakness and neglect.    Interval History:   Patient Complaints: None.  Patient Denies: Headache, nausea, or vomiting.    Review of Systems:   Musculoskeletal and Neurological systems were reviewed and are negative except for:  Neurological: positive for weakness    Vital Signs: Blood pressure 128/64, pulse 80, temperature 97.8 °F (36.6 °C), temperature source Oral, resp. rate 16, height 157.5 cm (62\"), weight 68 kg (150 lb), SpO2 96 %.  Intake/Output:   Intake/Output Summary (Last 24 hours) at 12/11/17 0638  Last data filed at 12/11/17 0436   Gross per 24 hour   Intake             2010 ml   Output              625 ml   Net             1385 ml       Physical Exam:  The patient is awake and alert.  She follow simple commands.  Her speech is fluent.  She is oriented to \"February\" of 2017.  She understands the circumstances of her admission.  She continues to have right eye deviation but her gaze will now move to the midline which is improved.  Left hemiparesis.  Left-sided neglect.     Data Review:    No significant stenosis noted on her carotid duplex study.    Assessment/Plan:  1.  Multiple areas of intracranial hemorrhage with the largest being a right frontal ICH.  Multiple areas of hemorrhage potentially do to hemorrhagic conversion of embolic infarcts.  Carotid duplex is pending.  Cardiac echo demonstrated atrial fibrillation.  2.  Atrial fibrillation.  3.  Hypertension.  3.  Disposition: Continue supportive care and observation.      Leo Proctor MD  12/11/17  6:38 AM      "

## 2017-12-11 NOTE — CONSULTS
Patient does not meet diabetes education order criteria (A1c >7.5%), therefore patient was not seen for diabetes education at this time. Patient has a history of type 2 diabetes and A1c was  7.0%.  Please re consult as needed.

## 2017-12-11 NOTE — PROGRESS NOTES
"Adult Nutrition  Assessment/PES    Patient Name:  Lois Brennan  YOB: 1940  MRN: [id number]  Admit Date:  12/9/2017    Assessment Date:  12/11/2017    Comments:  EN order set initiated; Diabetisource AC at 25 ml/hr w/ advancement as tolerated to goal rate of 65 ml/hr. Daily goal volume is 1250 ml. Free water at 25 ml/hr          Reason for Assessment       12/11/17 1125    Reason for Assessment    Reason For Assessment/Visit dysphagia;multidisciplinary rounds;TF/PN    Identified At Risk By Screening Criteria dysphagia or difficulty swallowing;tube feeding or parenteral nutrition    Time Spent (min) 45    Diagnosis --   per notes of this adm    Neurological Dysphagia              Nutrition/Diet History       12/11/17 1128    Nutrition/Diet History    Reported/Observed By RN    Other RN reports pt failed SLP FEES \"epically\" aspirated all consistencies; failed eval yesterday as well.            Anthropometrics       12/11/17 1129    Anthropometrics    RD Documented Weight on Admission --   pt reports wt at 151; requested RN to get actual wt             Labs/Tests/Procedures/Meds       12/11/17 1130    Labs/Tests/Procedures/Meds    Labs/Tests Review Reviewed    Procedure Review SLP    Swallow eval status Done    Type of SLP Evaluation --   FEES results severe oropharyngeal dysphagia unsafe for po diet at this time.    Medication Review Reviewed, pertinent     Results from last 7 days  Lab Units 12/11/17  0405 12/09/17  1539   SODIUM mmol/L 138 135   POTASSIUM mmol/L 4.0 3.7   CHLORIDE mmol/L 108 102   CO2 mmol/L 22.0 27.0   BUN mg/dL 21 11   CREATININE mg/dL 1.00 1.00   CALCIUM mg/dL 8.4* 8.9   BILIRUBIN mg/dL  --  0.6   ALK PHOS U/L  --  75   ALT (SGPT) U/L  --  7   AST (SGOT) U/L  --  12   GLUCOSE mg/dL 172* 166*                Physical Findings       12/11/17 1130    Physical Findings/Assessment    Additional Documentation Physical Appearance (Group)    Physical Appearance    Overall Physical " "Appearance --   appears well nourished            Estimated/Assessed Needs       12/11/17 1131    Calculation Measurements    Weight Used For Calculations 68 kg (149 lb 14.6 oz)    Height Used for Calculations 1.575 m (5' 2\")    Estimated/Assessed Energy Needs    Energy Need Method Kcal/kg;Crosby-St Jeor    kcal/kg 25    25 Kcal/Kg (kcal) 1700    Age 77    RMR (Crosby-St. Jeor Equation) 1128.25    Activity Factors (Crosby St Jeor)  Out of bed, ambulatory  1.3    Total estimated needs (Crosby St. Jeor) 1466    Estimated REE  ([place]) 1239    Activity Factors 1.3    Estimated Kcal ([place])  1610    Estimated Kcal Range  2750-6407 ; will provide 1500 kcal/d w/ presence of hemiparesis    Estimated/Assessed Protein Needs    Weight Used for Protein Calculation 68 kg (149 lb 14.6 oz)    Protein (gm/kg) 1.2    1.2 Gm Protein (gm) 81.6    Estimated Protein Range 68- 81  gm /d (1.0 -1.2 gm/kg)    Estimated/Assessed Fluid Needs    Fluid Need Method --   20-30 ml/kg =1360 -2040 ml H20/d                Problem/Interventions:        Problem 1       12/11/17 1135    Nutrition Diagnoses Problem 1    Problem 1 Needs Alternate Route    Etiology (related to) Medical Diagnosis    Neurological ICH;Dysphagia    Signs/Symptoms (evidenced by) NPO;SLP/Swallow eval    Swallow eval status Done    Type of SLP Evaluation Other (comment)   FEES results unsafe for any po intake                    Intervention Goal       12/11/17 1136    Intervention Goal    General Nutrition support treatment    TF/PN Inititiate TF/PN            Nutrition Intervention       12/11/17 1136    Nutrition Intervention    RD/Tech Action Recommend/ordered;Follow Tx progress;Care plan reviewd    Recommended/Ordered EN            Nutrition Prescription       12/11/17 1137    Nutrition Prescription EN    Enteral Prescription Enteral begin/change    Enteral Route NG    Product Diabetisource    TF Delivery Method Continuous    Continuous TF Goal Rate " (mL/hr) 65 mL/hr    Continuous TF Starting Rate (mL/hr) 25 mL/hr    Continuous TF Goal Volume (mL) 1250 mL    Continuous TF Starting Volume (mL) 600 mL    Water flush (mL)  25 mL    Water Flush Frequency Per hour    New EN Prescription Ordered? Yes   vo given by Dr. Silva to initiate TF during MDR    Other Orders    Labs NUT Panel;PreAlb;CRP            Education/Evaluation       12/11/17 1138    Monitor/Evaluation    Monitor Per protocol;Pertinent labs;I&O;TF delivery/tolerance;Symptoms        Electronically signed by:  Heather Kerns RD  12/11/17 11:39 AM

## 2017-12-11 NOTE — PLAN OF CARE
Problem: Patient Care Overview (Adult)  Goal: Plan of Care Review  Outcome: Ongoing (interventions implemented as appropriate)    12/11/17 1008   Coping/Psychosocial Response Interventions   Plan Of Care Reviewed With patient   Patient Care Overview   Progress progress toward functional goals is gradual   Outcome Evaluation   Outcome Summary/Follow up Plan FEES complete. Severe oropharyngeal dysphagia. Incomplete mastication of a solids. Increased A-P transit time w/ all trials. Asp before the swallow w/ thins via tsp 2' delayed initiation to the lateral channel and decreased airway closure. Initially pt safe w/ pudding and nectar but quickly fatigued. Asp during the swallow w/ nectar thick liquids and pudding 2' timing and decreased airway closure. Asp after the swallow 2' residue spilling from the lateral channels and posterior portion of the epiglottis. Pt at high risk for asp of honey via tsp 2' moderate lateral channel residue. On initial swallow of solid, residue of inadequately masticated solid remained in valleculae. It was swallowed w/ a liquids wash on nectar thick liquids which resulted in asp of nectar thick liquids but cleared solid residue. SLP unable to recommend safe PO diet at this time. REC: NPO w/ meds via alt route. SLP to follow for dysphagia tx.          Problem: Inpatient SLP  Goal: Dysphagia- Patient will improve swallowing skills to begin to take some PO safely  Outcome: Ongoing (interventions implemented as appropriate)    12/11/17 1008   Begin to Take Some PO Safely   Begin to Take Some PO Safely- SLP, Date Established 12/11/17   Begin to Take Some PO Safely- SLP, Time to Achieve by discharge   Begin to Take Some PO Safely- SLP, Date Goal Reviewed 12/11/17   Begin to Take Some PO Safely- SLP, Outcome goal ongoing

## 2017-12-11 NOTE — PROGRESS NOTES
Pulmonary/Critical Care Follow-up     LOS: 2 days   Patient Care Team:  No Known Provider as PCP - General        Chief Complaint   Patient presents with   • Stroke     Subjective    77-year-old woman with a history of diabetes, RA on chronic low dose prednisone, HTN who presented with left sided weakness, right frontal lobe hemorrhage with edema. She had a preceding episode of transient facial weakness earlier this week.  CT scan revealed a 3.7 x 3.8 cm frontal hemorrhage with some right to left shift and a smaller posterior and superior hemorrhage.    Patient had atrial fibrillation during her echocardiogram.      Interval History:   Patient is confused.  She continuously states she wants to go home.  She thinks her daughter is in the room.  She has ongoing left-sided weakness.  Patient is in atrial fibrillation with rapid ventricular rate this afternoon.    History taken from: Chart/patient    PMH/FH/Social History were reviewed and updated appropriately in the electronic medical record.     Review of Systems:    Review of 14 systems was completed with positives and pertinent negatives noted in the subjective section.  All other systems reviewed and are negative.   Exceptions are noted below:    Unable to obtain secondary to confusion      Objective     Vital Signs  Temp:  [97.8 °F (36.6 °C)-98.6 °F (37 °C)] 98.6 °F (37 °C)  Heart Rate:  [] 79  Resp:  [16-18] 18  BP: (117-160)/(48-96) 152/71  12/10 0701 - 12/11 0700  In: 2010 [I.V.:2010]  Out: 625 [Urine:625]  Body mass index is 27.44 kg/(m^2).     Physical Exam:     Constitutional:    Alert, cooperative, Confused appearing, in no acute distress   Head:    Normocephalic, left facial droop    Eyes:            Lids and lashes normal, conjunctivae and sclerae normal, no   icterus, no pallor, corneas clear, PER   ENMT:   Ears appear intact with no abnormalities noted      No oral lesions, no thrush, oral mucosa moist   Neck:   No adenopathy, supple, trachea  midline, no thyromegaly, no JVD       Lungs/Resp:     Normal effort, symmetric chest rise, no crepitus, clear to      auscultation bilaterally, no chest wall tenderness                Heart/CV:    Tachycardic, irregularly irregular, normal S1 and S2, no            murmur   Abdomen/GI:     Normal bowel sounds, no masses, no organomegaly, soft,        non-tender, non-distended   :     Deferred   Extremities/MSK:   No clubbing or cyanosis.  No edema.  Normal tone.  No deformities.    Pulses:   Pulses palpable and equal bilaterally   Skin:   No bleeding, bruising or rash   Heme/Lymph:   No cervical or supraclavicular adenopathy.    Neurologic:    Psychiatric:       Left side weakness 1/5 LUE  0/5 LLE, left facial droop.      Anxious.             Results Review:     I reviewed the patient's new clinical results.     Results from last 7 days  Lab Units 12/11/17  0405 12/09/17  1539   SODIUM mmol/L 138 135   POTASSIUM mmol/L 4.0 3.7   CHLORIDE mmol/L 108 102   CO2 mmol/L 22.0 27.0   BUN mg/dL 21 11   CREATININE mg/dL 1.00 1.00   CALCIUM mg/dL 8.4* 8.9   BILIRUBIN mg/dL  --  0.6   ALK PHOS U/L  --  75   ALT (SGPT) U/L  --  7   AST (SGOT) U/L  --  12   GLUCOSE mg/dL 172* 166*       Results from last 7 days  Lab Units 12/11/17  0405 12/10/17  0542 12/09/17  1539   WBC 10*3/mm3 10.60 5.37 7.24   HEMOGLOBIN g/dL 11.2* 12.2 11.9   HEMATOCRIT % 34.8 36.3 36.6   PLATELETS 10*3/mm3 209 246 224           Results from last 7 days  Lab Units 12/11/17  0405   MAGNESIUM mg/dL 2.0   PHOSPHORUS mg/dL 3.4       Results from last 7 days  Lab Units 12/10/17  0542   HEMOGLOBIN A1C % 7.00*       I reviewed the patient's new imaging including images and reports.    CT head 12/10/17:  IMPRESSION:  No interval change is noted in the intra-axial and  extracerebral bleed complex described above. There is no evidence of  progressive hemorrhage, progressive edema or progressive mass effect.    CT head 12/9/17:  IMPRESSION:      Intra-axial cerebral  bleed, right frontal parietal, with surrounding  edema and mass effect. Measurements and volumetric measurements are  offered above.      There is a 14 mm high attenuation structure adjacent to the posterior  right falx cerebri which could be a densely calcified meningioma or  second hemorrhage.      There is a small area of subarachnoid bleed along the right parietal  convexity in the sulcal GYRAL recesses.      There is considerable mass effect in the right hemisphere.    Echocardiogram 12/9/17:  · Left ventricular systolic function is normal. Estimated EF = 70%.  · Normal right ventricular cavity size, wall thickness, systolic function and septal motion noted.  · Saline test results are negative.  · The aortic valve exhibits sclerosis.  · No evidence of pulmonary hypertension is present.  · There is no evidence of pericardial effusion.  · Atrial fibrillation noted as primary rhythm.      Medication Review:     famotidine 20 mg Intravenous Q12H   insulin regular 0-9 Units Subcutaneous Q6H       niCARdipine 5-15 mg/hr Last Rate: Stopped (12/11/17 0011)   phenylephrine 0.5-3 mcg/kg/min    sodium chloride 75 mL/hr Last Rate: 75 mL/hr (12/11/17 0623)       Assessment/Plan     Principal Problem:    Intracranial hemorrhage  Active Problems:    Essential hypertension    Type 2 diabetes mellitus with complication, without long-term current use of insulin    Pharyngeal dysphagia    GERD (gastroesophageal reflux disease)    Dementia    Rheumatoid arthritis    Paroxysmal atrial fibrillation      Plan:  1.  Place feeding tube.  2.  Initiate tube feeding.  3.  Discontinue normal saline.  4.  Start Cardizem for atrial fibrillation.  5.  Physical therapy.  6.  Occupational therapy  7.  Speech therapy.  8.  Need to further discuss goals of care.    Aquiles Silva MD  12/11/17  5:00 PM        *. Please note that portions of this note were completed with a voice recognition program. Efforts were made to edit the dictations, but  occasionally words are mistranscribed.

## 2017-12-11 NOTE — MBS/VFSS/FEES
Acute Care - Speech Language Pathology   Swallow Initial Evaluation [facility]   Fiberoptic Endoscopic Evaluation of Swallowing (FEES)       Patient Name: Lois Brennan  : 1940  MRN: [id number]  Today's Date: 2017  Onset of Illness/Injury or Date of Surgery Date: 17            Admit Date: 2017    Visit Dx:     ICD-10-CM ICD-9-CM   1. Intracranial hematoma, right, without loss of consciousness, initial encounter S06.340A 853.01   2. Impaired mobility and ADLs Z74.09 799.89   3. Impaired functional mobility, balance, gait, and endurance Z74.09 V49.89     Patient Active Problem List   Diagnosis   • Intracranial hemorrhage   • Essential hypertension   • Type 2 diabetes mellitus with complication, without long-term current use of insulin     Past Medical History:   Diagnosis Date   • Dementia    • Diabetes mellitus    • GERD (gastroesophageal reflux disease)    • Hypertension    • Rheumatoid arthritis    • TIA (transient ischemic attack)      Past Surgical History:   Procedure Laterality Date   • CATARACT EXTRACTION, BILATERAL     • EYE SURGERY            SWALLOW EVALUATION (last 72 hours)      Swallow Evaluation       17 0930 12/10/17 1040             Rehab Evaluation    Document Type  evaluation  -LS       Subjective Information  no complaints;agree to therapy  -LS       General Information    Patient Profile Review  yes  -LS       Subjective Patient Observations  alert and oriented, cooperative  -LS       Pertinent History Of Current Problem  admit w/ ICH in R fronatalparital lobe, mild dementia at baseline per RN  -LS       Current Diet Limitations  NPO  -LS       Prior Level of Function- Communication  other (comment)   mild dementia  -LS       Prior Level of Function- Swallowing  no diet consistency restrictions  -LS       Plans/Goals Discussed With  patient  -LS       Barriers to Rehab  medically complex  -LS       Clinical Impression    Patient's Goals For Discharge patient did  not state  -        Criteria for Skilled Therapeutic Interventions Met skilled criteria for dysphagia intervention met  -LS skilled criteria for dysphagia intervention met  -LS       FCM, Swallowing 1-->Level 1  -LS 1-->Level 1  -LS       Therapy Frequency 5 times/wk  -LS PRN  -LS       SLP Diet Recommendation NPO: unsafe for food/liquid intake  -LS NPO: unsafe for food/liquid intake  -       Recommended Diagnostics FEES   completed 12/11/17  - FEES  -LS       SLP Rec. for Method of Medication Administration meds via alternate route  -LS meds via alternate route  -       Pain Assessment    Pain Assessment  No/denies pain  -       Oral Motor Structure and Function    Oral Musculature General Assessment  oral labial impairment  -       Oral Motor Assessment Comment  L facial and labial droop  -LS       Clinical Swallow Exam    Mode of Presentation  fed by clinician;cup;spoon  -       Oral Phase Results  impaired oral phase, signs of dysfunction present  -LS       Pharyngeal Phase Results  sign/symptoms of pharyngeal impairment  -       Summary of Clinical Exam  R/O pharyngeal dysphagia. Oral phase deficits c/b L facial/labial droop  and oral residue on the L w/ pureed. S/S of asp included overt coughing w/ tsp of thins, multiple swallows with pureed and nectar thick. SLP unable to rec safe diet at the bedside. REC: NPO w/ meds via alt route, FEES to be completed 12/11.  -LS       Fiberoptic Endoscopic Swallowing Exam    Risks/Benefits Reviewed risks/benefits explained;agreed to eval;patient  -LS        Positioning Needs for Swallow Exam upright in chair  -        Anatomy and Physiology    Velopharyngeal WFL  -LS        Base of Tongue symmetrical  -LS        Epiglottis WFL  -LS        Laryngeal Function Breathing asymmetrical;decreased movement left  -LS        Laryngeal Function Phonation symmetrical;decreased movement left  -LS        Laryngeal Function Breath Hold incomplete closure  -LS         Secretions 1- Secretions present around the laryngeal vestibule  -LS        Secretion Description thin;white  -LS        Spontaneous Swallow frequency reduced  -LS        Sensory reduced sensation  -LS        Oral-Phary Transit increased transit time;increased prep time;inadequate mastication  -LS        Anatomy Hypopharynx    Observation Anatomic Considerations no anatomic structural deviation via FEES  -LS        FEES    Pharyngeal Phase Impairment thin:;nectar:;honey:;pudding:;cohesive solid:;impaired timing with aspiration during;aspiration after from residue  -LS        Post Swallow Residue pudding:;mixed:;thin:;residue present in valleculae;residue present pyriform sinuses   lateral channel  -LS        Response to Aspiration absent response, silent aspiration;unproductive reflexive cough  -LS        Attempted Compensatory Maneuvers bolus size;bolus presentation style  -LS        Pharyngeal Phase Results impaired pharyngeal phase of swallowing  -LS        FEES Summary Severe oropharyengeal dysphagia. Incompelted mastication of  a solids. Increased A-P transit time w/ all trials. Asp before the swallow w/ thins via tsp 2' delayed initiation to the lateral channel and decreased airway closure.  Initially pt safe w/ pudding and nectar but quickly fatigued. Asp during the swallow w/ nectar thick liquids and pudding 2' timing and decreased airway closure. Asp after the swallow 2' residue spilling from the lateral channels and posterior portion of the epiglottis. Pt at high risk for asp of honey via tsp 2' moderate lateral channel residue. On initial swallow of solid, residue of inadequetly masticated solid remained in valleculea. It was swallowed w/ a liquids wash on nectar thick liquids which resulted in asp of nectar thick liquids but cleared solid residue. SLP unable to recommend safe Po diet at this time. REC: NPO w/ meds via alt route. SLP to follow for dysphagia tx.    -LS          User Key  (r) = Recorded By,  (t) = Taken By, (c) = Cosigned By    Initials Name Effective Dates    LS Sharri Conklin, MS [facility]-SLP 06/22/15 -         EDUCATION  The patient has been educated in the following areas:   Dysphagia (Swallowing Impairment) Oral Care/Hydration NPO rationale.    SLP Recommendation and Plan     SLP Diet Recommendation: NPO: unsafe for food/liquid intake     SLP Rec. for Method of Medication Administration: meds via alternate route     Recommended Diagnostics: FEES (completed 12/11/17)  Criteria for Skilled Therapeutic Interventions Met: skilled criteria for dysphagia intervention met        Therapy Frequency: 5 times/wk             Plan of Care Review  Plan Of Care Reviewed With: patient  Progress: progress toward functional goals is gradual  Outcome Summary/Follow up Plan: Severe oropharyngeal dysphagia. Incomplete mastication of  a solids. Increased A-P transit time w/ all trials. Asp before the swallow w/ thins via tsp 2' delayed initiation to the lateral channel and decreased airway closure.  Initially pt safe w/ pudding and nectar but quickly fatigued. Asp during the swallow w/ nectar thick liquids and pudding 2' timing and decreased airway closure. Asp after the swallow 2' residue spilling from the lateral channels and posterior portion of the epiglottis. Pt at high risk for asp of honey via tsp 2' moderate lateral channel residue. On initial swallow of solid, residue of inadequately masticated solid remained in valleculae. It was swallowed w/ a liquids wash on nectar thick liquids which resulted in asp of nectar thick liquids but cleared solid residue. SLP unable to recommend safe Po diet at this time. REC: NPO w/ meds via alt route. SLP to follow for dysphagia tx.            IP SLP Goals       12/11/17 1008          Begin to Take Some PO Safely    Begin to Take Some PO Safely- SLP, Date Established 12/11/17  -LS      Begin to Take Some PO Safely- SLP, Time to Achieve by discharge  -LS      Begin to Take Some PO  Safely- SLP, Date Goal Reviewed 12/11/17  -LS      Begin to Take Some PO Safely- SLP, Outcome goal ongoing  -LS        User Key  (r) = Recorded By, (t) = Taken By, (c) = Cosigned By    Initials Name Provider Type    NASRIN Conklin MS CCC-SLP Speech and Language Pathologist               Time Calculation:         Time Calculation- SLP       12/11/17 1010          Time Calculation- SLP    SLP Start Time 0930  -LS      SLP Received On 12/11/17  -LS        User Key  (r) = Recorded By, (t) = Taken By, (c) = Cosigned By    Initials Name Provider Type    NASRIN Conklin MS CCC-SLP Speech and Language Pathologist          Therapy Charges for Today     Code Description Service Date Service Provider Modifiers Qty    10072006723 HC ST EVAL ORAL PHARYNG SWALLOW 3 12/10/2017 Sharri Conklin MS CCC-SLP GN 1    92857880603 HC ST EVAL SPEECH AND PROD W LANG  1 12/10/2017 Sharri Conklin MS CCC-SLP GN 1    99799934025 HC ST FIBEROPTIC ENDO EVAL SWALL 5 12/11/2017 Sharri Conklin MS CCC-SLP GN 1               Sharri Conklin MS CCC-SLP  12/11/2017

## 2017-12-11 NOTE — PLAN OF CARE
Problem: Patient Care Overview (Adult)  Goal: Plan of Care Review  Outcome: Ongoing (interventions implemented as appropriate)    12/11/17 0908   Coping/Psychosocial Response Interventions   Plan Of Care Reviewed With patient   Patient Care Overview   Progress progress toward functional goals as expected   Outcome Evaluation   Outcome Summary/Follow up Plan Pt. demonstrates L lateral lean while seated in chair. During sit->stand, initially demonstrates posterior lean requiring A and VC's to correct. Pt. unable to demonstrate L side steps to complete pivot today. Amb distance limited by increased HR.         Problem: Inpatient Physical Therapy  Goal: Transfer Training Goal 1 LTG- PT  Outcome: Ongoing (interventions implemented as appropriate)    12/10/17 1021 12/11/17 0908   Transfer Training PT LTG   Transfer Training PT LTG, Date Established 12/10/17 --    Transfer Training PT LTG, Time to Achieve 2 wks --    Transfer Training PT LTG, Activity Type bed to chair /chair to bed;sit to stand/stand to sit --    Transfer Training PT LTG, [place] Level contact guard assist --    Transfer Training PT LTG, Outcome --  goal ongoing       Goal: Gait Training Goal LTG- PT  Outcome: Ongoing (interventions implemented as appropriate)    12/10/17 1021 12/11/17 0908   Gait Training PT LTG   Gait Training Goal PT LTG, Date Established 12/10/17 --    Gait Training Goal PT LTG, Time to Achieve 2 wks --    Gait Training Goal PT LTG, [place] Level minimum assist (75% patient effort);2 person assist required --    Gait Training Goal PT LTG, Distance to Achieve 100 --    Gait Training Goal PT LTG, Outcome --  goal ongoing       Goal: Static Sitting Balance Goal LTG- PT  Outcome: Ongoing (interventions implemented as appropriate)    12/10/17 1021 12/11/17 0908   Static Sitting Balance PT LTG   Static Sitting Balance PT LTG, Date Established 12/10/17 --    Static Sitting Balance PT LTG, Time to Achieve 2 wks --    Static Sitting  Balance PT LTG, [place] Level supervision required --    Static Sitting Balance PT LTG, Assist Device UE Support --    Static Sitting Balance PT LTG, Outcome --  goal ongoing

## 2017-12-11 NOTE — PROGRESS NOTES
Discharge Planning Assessment  [facility]     Patient Name: Lois Brennan  MRN: [id number]  Today's Date: 12/11/2017    Admit Date: 12/9/2017          Discharge Needs Assessment       12/11/17 1532    Living Environment    Lives With alone    Living Arrangements house    Transportation Available car;family or friend will provide    Discharge Needs Assessment    Concerns To Be Addressed adjustment to diagnosis/illness concerns;basic needs concerns;cognitive/perceptual concerns    Readmission Within The Last 30 Days no previous admission in last 30 days    Anticipated Changes Related to Illness inability to care for self    Equipment Currently Used at Home none            Discharge Plan       12/11/17 1533    Case Management/Social Work Plan    Plan Rehab    Patient/Family In Agreement With Plan yes    Additional Comments Will need rehab.  Call to her QUINTEN Stover to discuss home and goals.  Await CB         Discharge Placement     No information found                Demographic Summary     None            Functional Status       12/11/17 1531    Functional Status Current    Ambulation 3-->assistive equipment and person    Transferring 3-->assistive equipment and person    Toileting 2-->assistive person    Bathing 2-->assistive person    Dressing 2-->assistive person    Eating 0-->independent    Communication 0-->understands/communicates without difficulty    Swallowing (if score 2 or more for any item, consult Rehab Services) 2-->difficulty swallowing liquids/foods    Functional Status Prior    Ambulation 0-->independent    Transferring 0-->independent    Toileting 0-->independent    Bathing 0-->independent    Dressing 0-->independent    Eating 0-->independent    Communication 0-->understands/communicates without difficulty    Swallowing 0-->swallows foods/liquids without difficulty    IADL    Medications independent    Meal Preparation independent    Housekeeping independent    Laundry independent     Shopping independent    Oral Care independent            Psychosocial     None            Abuse/Neglect     None            Legal     None            Substance Abuse     None            Patient Forms     None          Nnamdi Hatch RN

## 2017-12-12 ENCOUNTER — APPOINTMENT (OUTPATIENT)
Dept: GENERAL RADIOLOGY | Facility: HOSPITAL | Age: 77
End: 2017-12-12

## 2017-12-12 LAB
DEPRECATED RDW RBC AUTO: 46.8 FL (ref 37–54)
ERYTHROCYTE [DISTWIDTH] IN BLOOD BY AUTOMATED COUNT: 14.4 % (ref 11.3–14.5)
GLUCOSE BLDC GLUCOMTR-MCNC: 116 MG/DL (ref 70–130)
GLUCOSE BLDC GLUCOMTR-MCNC: 135 MG/DL (ref 70–130)
GLUCOSE BLDC GLUCOMTR-MCNC: 147 MG/DL (ref 70–130)
GLUCOSE BLDC GLUCOMTR-MCNC: 150 MG/DL (ref 70–130)
HCT VFR BLD AUTO: 33.3 % (ref 34.5–44)
HGB BLD-MCNC: 10.9 G/DL (ref 11.5–15.5)
MCH RBC QN AUTO: 28.8 PG (ref 27–31)
MCHC RBC AUTO-ENTMCNC: 32.7 G/DL (ref 32–36)
MCV RBC AUTO: 87.9 FL (ref 80–99)
PLATELET # BLD AUTO: 201 10*3/MM3 (ref 150–450)
PMV BLD AUTO: 10.2 FL (ref 6–12)
RBC # BLD AUTO: 3.79 10*6/MM3 (ref 3.89–5.14)
WBC NRBC COR # BLD: 8.66 10*3/MM3 (ref 3.5–10.8)

## 2017-12-12 PROCEDURE — 74000 HC ABDOMEN KUB: CPT

## 2017-12-12 PROCEDURE — 85027 COMPLETE CBC AUTOMATED: CPT | Performed by: INTERNAL MEDICINE

## 2017-12-12 PROCEDURE — 0 DIATRIZOATE MEGLUMINE & SODIUM PER 1 ML: Performed by: INTERNAL MEDICINE

## 2017-12-12 PROCEDURE — 92610 EVALUATE SWALLOWING FUNCTION: CPT

## 2017-12-12 PROCEDURE — 97110 THERAPEUTIC EXERCISES: CPT

## 2017-12-12 PROCEDURE — 82962 GLUCOSE BLOOD TEST: CPT

## 2017-12-12 PROCEDURE — 63710000001 PREDNISONE PER 5 MG: Performed by: INTERNAL MEDICINE

## 2017-12-12 PROCEDURE — 93005 ELECTROCARDIOGRAM TRACING: CPT | Performed by: INTERNAL MEDICINE

## 2017-12-12 PROCEDURE — 92507 TX SP LANG VOICE COMM INDIV: CPT

## 2017-12-12 PROCEDURE — 99233 SBSQ HOSP IP/OBS HIGH 50: CPT | Performed by: INTERNAL MEDICINE

## 2017-12-12 PROCEDURE — 93010 ELECTROCARDIOGRAM REPORT: CPT | Performed by: INTERNAL MEDICINE

## 2017-12-12 PROCEDURE — 92612 ENDOSCOPY SWALLOW (FEES) VID: CPT

## 2017-12-12 PROCEDURE — 97530 THERAPEUTIC ACTIVITIES: CPT

## 2017-12-12 RX ORDER — PREDNISONE 1 MG/1
7.5 TABLET ORAL DAILY
Status: DISCONTINUED | OUTPATIENT
Start: 2017-12-12 | End: 2017-12-15 | Stop reason: HOSPADM

## 2017-12-12 RX ORDER — DILTIAZEM HYDROCHLORIDE 180 MG/1
180 CAPSULE, COATED, EXTENDED RELEASE ORAL EVERY 24 HOURS
Status: DISCONTINUED | OUTPATIENT
Start: 2017-12-12 | End: 2017-12-13

## 2017-12-12 RX ORDER — AMLODIPINE BESYLATE 5 MG/1
5 TABLET ORAL EVERY 24 HOURS
Status: DISCONTINUED | OUTPATIENT
Start: 2017-12-12 | End: 2017-12-12

## 2017-12-12 RX ORDER — LABETALOL HYDROCHLORIDE 5 MG/ML
20 INJECTION, SOLUTION INTRAVENOUS EVERY 4 HOURS PRN
Status: DISCONTINUED | OUTPATIENT
Start: 2017-12-12 | End: 2017-12-15 | Stop reason: HOSPADM

## 2017-12-12 RX ORDER — DILTIAZEM HYDROCHLORIDE 180 MG/1
180 CAPSULE, COATED, EXTENDED RELEASE ORAL
Status: DISCONTINUED | OUTPATIENT
Start: 2017-12-12 | End: 2017-12-12

## 2017-12-12 RX ORDER — AMLODIPINE BESYLATE 5 MG/1
5 TABLET ORAL
Status: DISCONTINUED | OUTPATIENT
Start: 2017-12-12 | End: 2017-12-12

## 2017-12-12 RX ORDER — FAMOTIDINE 20 MG/1
20 TABLET, FILM COATED ORAL EVERY 12 HOURS SCHEDULED
Status: DISCONTINUED | OUTPATIENT
Start: 2017-12-13 | End: 2017-12-15 | Stop reason: HOSPADM

## 2017-12-12 RX ADMIN — PREDNISONE 7.5 MG: 5 TABLET ORAL at 20:44

## 2017-12-12 RX ADMIN — NICARDIPINE HYDROCHLORIDE 5 MG/HR: 0.1 INJECTION, SOLUTION INTRAVENOUS at 05:37

## 2017-12-12 RX ADMIN — INSULIN HUMAN 2 UNITS: 100 INJECTION, SOLUTION PARENTERAL at 00:33

## 2017-12-12 RX ADMIN — FAMOTIDINE 20 MG: 10 INJECTION, SOLUTION INTRAVENOUS at 08:02

## 2017-12-12 RX ADMIN — Medication 45 ML: at 10:15

## 2017-12-12 RX ADMIN — FAMOTIDINE 20 MG: 10 INJECTION, SOLUTION INTRAVENOUS at 20:44

## 2017-12-12 RX ADMIN — DILTIAZEM HYDROCHLORIDE 180 MG: 180 CAPSULE, COATED, EXTENDED RELEASE ORAL at 17:54

## 2017-12-12 NOTE — THERAPY TREATMENT NOTE
Acute Care - Occupational Therapy Treatment Note  [facility]     Patient Name: Lois Brennan  : 1940  MRN: [id number]  Today's Date: 2017  Onset of Illness/Injury or Date of Surgery Date: 17  Date of Referral to OT: 17  Referring Physician: Dr. Espitia      Admit Date: 2017    Visit Dx:     ICD-10-CM ICD-9-CM   1. Intracranial hematoma, right, without loss of consciousness, initial encounter S06.340A 853.01   2. Impaired mobility and ADLs Z74.09 799.89   3. Impaired functional mobility, balance, gait, and endurance Z74.09 V49.89     Patient Active Problem List   Diagnosis   • Intracranial hemorrhage   • Essential hypertension   • Type 2 diabetes mellitus with complication, without long-term current use of insulin   • Pharyngeal dysphagia   • GERD (gastroesophageal reflux disease)   • Dementia   • Rheumatoid arthritis   • Paroxysmal atrial fibrillation             Adult Rehabilitation Note       17 0839 17 0835 17 0819    Rehab Assessment/Intervention    Discipline physical therapist  -MJ occupational therapist  -CL physical therapist  -MJ    Document Type therapy note (daily note)  -MJ therapy note (daily note)  -CL therapy note (daily note)  -MJ    Subjective Information agree to therapy;no complaints  -MJ agree to therapy;no complaints  -CL agree to therapy;complains of;pain   R shoulder  -MJ    Patient Effort, Rehab Treatment good  -MJ excellent  -CL good  -MJ    Symptoms Noted During/After Treatment significant change in vital signs   monitor HR throughout  -MJ significant change in vital signs  -CL significant change in vital signs   elevated HR  -MJ    Symptoms Noted Comment RN informed and completed ambulation with PT/OT and pt  -MJ Noted increased HR throughout, RN aware and present.   -CL     Precautions/Limitations  fall precautions;other (see comments)   L sided weakness/numbness, L neglect  -CL fall precautions;other (see comments)   L neglect  -MJ     Recorded by [MJ] Hamida De Paz, PT [CL] Martine Silverio OT [MJ] Hamida De Paz, PT    Vital Signs    Pre Systolic BP Rehab 130  -  -  -MJ    Pre Treatment Diastolic BP 65  -MJ 65  -CL 54  -MJ    Post Systolic BP Rehab 130  -  -  -MJ    Post Treatment Diastolic BP 55  -MJ 55  -CL 64  -MJ    Pretreatment Heart Rate (beats/min)  105  -  -MJ    Intratreatment Heart Rate (beats/min) 160   questionable accuracy  -   Pt w/ noted A-arley RN present and aware  -  -MJ    Posttreatment Heart Rate (beats/min)   110  -MJ    Pre SpO2 (%) 98  -MJ 98  -CL 97  -MJ    O2 Delivery Pre Treatment room air  -MJ room air  -CL room air  -MJ    Post SpO2 (%) 96  -MJ 96  -CL 97  -MJ    O2 Delivery Post Treatment room air  -MJ room air  -CL room air  -MJ    Pre Patient Position Sitting  -MJ Sitting  -CL Sitting  -MJ    Intra Patient Position Standing  -MJ Standing  -CL Standing  -MJ    Post Patient Position Sitting  -MJ Standing  -CL Sitting  -MJ    Recorded by [MJ] Hamida De Paz, PT [CL] Martine Silverio OT [MJ] Hamida De Paz, PT    Pain Assessment    Pain Assessment 0-10  -MJ 0-10  -CL 0-10  -MJ    Pain Score 0  -MJ 0  -CL 5  -MJ    Post Pain Score 0  -MJ 0  -CL 5  -MJ    Pain Location   Shoulder   Right  -MJ    Pain Intervention(s) Repositioned;Ambulation/increased activity  -MJ Repositioned;Ambulation/increased activity  -CL Ambulation/increased activity;Repositioned  -MJ    Response to Interventions tolerated  -MJ Tolerated.   -CL tolerated  -MJ    Recorded by [MJ] Hamida De Paz, PT [CL] Martine Silverio OT [MJ] Hamida De Paz, PT    Vision Assessment/Intervention    Visual Impairment decreased visual tracking   able to track to midline and slightly beyond with VCs  -MJ left neglect  -CL decreased tracking across midline;decreased visual tracking   decreased tracking to L  -MJ    Visual Impairment Comment verbal and visual cues to track to midline and slightly beyond  -MJ Pt demo tracking to L  this date w/ MAX VCs/TCs.   -CL     Recorded by [MJ] Hamida De Paz, PT [CL] Martine Silverio OT [MJ] Hamida De Paz PT    Cognitive Assessment/Intervention    Current Cognitive/Communication Assessment impaired  -MJ impaired  -CL impaired  -MJ    Orientation Status oriented to;person;place;disoriented to;time  -MJ oriented to;person;place;disoriented to;time  -CL oriented to;person;place;required verbal cueing (specifiy in comments);disoriented to;time   VCs for name of hospital  -MJ    Follows Commands/Answers Questions able to follow single-step instructions;75% of the time;needs increased time;needs cueing  -MJ 75% of the time;needs cueing;needs increased time;needs repetition  -CL able to follow single-step instructions;50% of the time;needs cueing;needs increased time;needs repetition  -MJ    Personal Safety moderate impairment;decreased awareness, need for assist;decreased awareness, need for safety;decreased insight to deficits  -MJ moderate impairment;decreased awareness, need for assist;decreased awareness, need for safety;decreased insight to deficits  -CL moderate impairment;decreased awareness, need for assist;decreased awareness, need for safety  -MJ    Personal Safety Interventions fall prevention program maintained;gait belt;muscle strengthening facilitated;nonskid shoes/slippers when out of bed  -MJ fall prevention program maintained;gait belt;nonskid shoes/slippers when out of bed  -CL fall prevention program maintained;gait belt;muscle strengthening facilitated;nonskid shoes/slippers when out of bed  -MJ    Recorded by [MJ] Hamida De Paz, PT [CL] Martine Silverio, [place] [MJ] Hamida De Paz PT    Bed Mobility, Assessment/Treatment    Bed Mob, Supine to Sit, [place] not tested  -MJ  not tested  -MJ    Bed Mob, Sit to Supine, [place] not tested  -MJ      Bed Mobility, Comment UIC  -MJ UIC.   -CL UIC  -MJ    Recorded by [MJ] Hamida De Paz, PT [CL] Martine Silverio OT [MJ] Hamida De Paz, PT     Transfer Assessment/Treatment    Transfers, Sit-Stand [place] moderate assist (50% patient effort);verbal cues required  -MJ moderate assist (50% patient effort);2 person assist required;verbal cues required  -CL moderate assist (50% patient effort)   initially with posterior lean; verbal & tactile cues needed  -MJ    Transfers, Stand-Sit [place] moderate assist (50% patient effort);verbal cues required  -MJ moderate assist (50% patient effort);2 person assist required;verbal cues required  -CL moderate assist (50% patient effort)  -MJ    Transfers, Sit-Stand-Sit, Assist Device rolling walker   A for LUE placement and to maintain  -MJ rolling walker  -CL rolling walker   gait belt  -MJ    Transfer, Safety Issues step length decreased;balance decreased during turns;sequencing ability decreased  -MJ      Transfer, Impairments strength decreased;impaired balance  -MJ      Transfer, Comment sit to stand from bedside chair; VC's for hand positioning.  Initially posterior lean upon coming to stand.  -MJ Pt performed x2 reps, noted intial posterior lean. VCs and assist to place L hand onto RW.   -CL attempted side steps to L for pivot to BSC but unable to initiate side steps completing forward steps.  Commode brought behind patient  -MJ    Recorded by [MJ] Hamida De Paz, PT [CL] Martine Silverio OT [MJ] Hamida De Paz, PT    Gait Assessment/Treatment    Gait, [place] Level moderate assist (50% patient effort);1 person + 1 person to manage equipment;verbal cues required   VC's to increase LLE step length  -MJ  moderate assist (50% patient effort);1 person + 1 person to manage equipment   chair follow  -MJ    Gait, Assistive Device rolling walker  -MJ  rolling walker  -MJ    Gait, Distance (Feet) 75   15+60; seated rest break between  -MJ  15  -MJ    Gait, Gait Pattern Analysis swing-to gait   progressed to swing through LLE with VCs  -MJ  swing-to gait  -MJ    Gait, Gait Deviations bhupinder  decreased;left:;decreased heel strike;step length decreased  -MJ  bhupinder decreased;double stance time increased;decreased heel strike  -MJ    Gait, Safety Issues step length decreased;balance decreased during turns  -MJ  balance decreased during turns;sequencing ability decreased;step length decreased;weight-shifting ability decreased  -MJ    Gait, Impairments strength decreased;impaired balance  -MJ  strength decreased;impaired balance  -MJ    Gait, Comment requires A to maintain LUE hand placement on rwx; L lateral lean noted; responds well to VCs for increased LLE step length and to look ahead  -MJ  elevated HR limited distance  -MJ    Recorded by [MJ] Hamida De Paz, PT  [MJ] Hamida De Paz, PT    Functional Mobility    Functional Mobility- Ind. Level  moderate assist (50% patient effort);2 person assist required;verbal cues required  -CL     Functional Mobility- Device  rolling walker  -CL     Functional Mobility-Distance (Feet)  75  -CL     Functional Mobility- Comment  Pt required 1 seated rest break. Pt requires constant assist to maintain grasp onto RW w/ L hand and assist to guide RW. Pt w/ significant L lateral LOB requiring Mod A for safety.   -CL     Recorded by  [CL] Martine Silverio OT     Motor Skills/Interventions    Additional Documentation Balance Skills Training (Group)  -MJ Balance Skills Training (Group)  -CL     Recorded by [MJ] Hamida De Paz, PT [CL] Martine Silverio OT     Balance Skills Training    Sitting-Level of Assistance Contact guard  -MJ Contact guard  -CL Contact guard  -MJ    Sitting-Balance Support Feet supported;Right upper extremity supported  -MJ Right upper extremity supported;Feet supported  -CL Feet supported;Right upper extremity supported  -MJ    Sitting-Balance Activities Trunk control activities  -MJ Forward lean;Trunk control activities  -CL     Standing-Level of Assistance Moderate assistance  -MJ Moderate assistance  -CL Moderate assistance  -MJ    Static Standing  Balance Support assistive device  -MJ assistive device  -CL assistive device  -MJ    Standing-Balance Activities Weight Shift R-L;Weight Shift A-P;Forward lean   forward lean to obtain center of balance due to post lean   -MJ Weight Shift A-P;Weight Shift R-L  -CL Weight Shift R-L;Weight Shift A-P  -MJ    Gait Balance-Level of Assistance Moderate assistance  -MJ Moderate assistance  -CL Moderate assistance  -MJ    Gait Balance Support assistive device   req A to maintain LUE hand placement  -MJ assistive device  -CL assistive device  -MJ    Gait Balance Activities scanning environment R/L  -MJ scanning environment R/L  -CL     Recorded by [MJ] Hamida De Paz, PT [CL] Martine Silverio OT [MJ] Hamida De Paz PT    Therapy Exercises    Bilateral Lower Extremities sitting;AROM:;10 reps;ankle pumps/circles;LAQ  -MJ  sitting;AROM:;10 reps;ankle pumps/circles;LAQ  -MJ    Right Upper Extremity   sitting;AROM:;10 reps;elbow flexion/extension;shoulder extension/flexion  -MJ    Left Upper Extremity  AAROM:;PROM:;10 reps;elbow flexion/extension;hand pumps;pronation/supination;shoulder extension/flexion;shoulder ER/IR   wrist F/E  -CL sitting;AAROM:;elbow flexion/extension;shoulder extension/flexion  -MJ    Recorded by [MJ] Hamida De Paz, PT [CL] Martine Silverio OT [MJ] Hamida De Paz PT    Positioning and Restraints    Pre-Treatment Position sitting in chair/recliner  -MJ sitting in chair/recliner  -CL sitting in chair/recliner  -MJ    Post Treatment Position chair  -MJ chair  -CL chair  -MJ    In Chair reclined;notified nsg;call light within reach;encouraged to call for assist;exit alarm on;legs elevated  -MJ notified nsg;reclined;call light within reach;encouraged to call for assist;exit alarm on;with PT  -CL notified nsg;sitting;call light within reach;encouraged to call for assist;exit alarm on;LUE elevated;legs elevated;heels elevated  -MJ    Recorded by [MJ] Hamida De Paz, PT [CL] Martine Silverio OT [MJ] Hamida De Paz,  PT      User Key  (r) = Recorded By, (t) = Taken By, (c) = Cosigned By    Initials Name Effective Dates    CL Martine Silverio, OT 06/08/16 -     MJ Hamida De Paz, PT 10/30/17 -                 OT Goals       12/12/17 1316 12/10/17 1026       Transfer Training OT LTG    Transfer Training OT LTG, Date Established  12/10/17  -TA     Transfer Training OT LTG, Time to Achieve  1 wk  -TA     Transfer Training OT LTG, Activity Type  bed to chair /chair to bed;sit to stand/stand to sit;toilet  -TA     Transfer Training OT LTG, [place] Level  minimum assist (75% patient effort);2 person assist required  -TA     Transfer Training OT LTG, Assist Device  --   AAD  -TA     Transfer Training OT LTG, Outcome goal ongoing  -CL goal ongoing  -TA     Strength OT LTG    Strength Goal OT LTG, Date Established  12/10/17  -TA     Strength Goal OT LTG, Time to Achieve  1 wk  -TA     Strength Goal OT LTG, Measure to Achieve  --   Increase LUE MMS by 1/2 MMG to support fxl I  -TA     Strength Goal OT LTG, Outcome goal ongoing  -CL goal ongoing  -TA     Grooming OT LTG    Grooming Goal OT LTG, Date Established  12/10/17  -TA     Grooming Goal OT LTG, Time to Achieve  1 wk  -TA     Grooming Goal OT LTG, [place] Level  minimum assist (75% patient effort);verbal cues required;nonverbal cues required (demo/gesture)  -TA     Grooming Goal OT LTG, Position  sitting in chair  -TA     Grooming Goal OT LTG, Outcome goal ongoing  -CL goal ongoing  -TA     Toileting OT LTG    Toileting Goal OT LTG, Date Established  12/10/17  -TA     Toileting Goal OT LTG, Time to Achieve  1 wk  -TA     Toileting Goal OT LTG, [place] Level  maximum assist (25% patient effort)  -TA     Toileting Goal OT LTG, Outcome goal ongoing  -CL goal ongoing  -TA       User Key  (r) = Recorded By, (t) = Taken By, (c) = Cosigned By    Initials Name Provider Type    TA Azam Ramos, OT Occupational Therapist    CL Martine Silverio, OT Occupational Therapist           Occupational Therapy Education     Title: PT OT SLP Therapies (Active)     Topic: Occupational Therapy (Active)     Point: ADL training (Active)    Description: Instruct learner(s) on proper safety adaptation and remediation techniques during self care or transfers.   Instruct in proper use of assistive devices.    Learning Progress Summary    Learner Readiness Method Response Comment Documented by Status   Patient Acceptance E,D NR Pt educated on appropriate safety precautions, t/f techniques, HEP, and benefits of therapy.  12/12/17 1315 Active    Acceptance E NR Role of OT; reinforced need for call for assist with OOB activities. TA 12/10/17 1024 Active               Point: Home exercise program (Active)    Description: Instruct learner(s) on appropriate technique for monitoring, assisting and/or progressing therapeutic exercises/activities.    Learning Progress Summary    Learner Readiness Method Response Comment Documented by Status   Patient Acceptance E,D NR Pt educated on appropriate safety precautions, t/f techniques, HEP, and benefits of therapy.  12/12/17 1315 Active               Point: Precautions (Active)    Description: Instruct learner(s) on prescribed precautions during self-care and functional transfers.    Learning Progress Summary    Learner Readiness Method Response Comment Documented by Status   Patient Acceptance E,D NR Pt educated on appropriate safety precautions, t/f techniques, HEP, and benefits of therapy.  12/12/17 1315 Active    Acceptance E NR Role of OT; reinforced need for call for assist with OOB activities. TA 12/10/17 1024 Active               Point: Body mechanics (Active)    Description: Instruct learner(s) on proper positioning and spine alignment during self-care, functional mobility activities and/or exercises.    Learning Progress Summary    Learner Readiness Method Response Comment Documented by Status   Patient Acceptance E,D NR Pt educated on appropriate safety  precautions, t/f techniques, HEP, and benefits of therapy. CL 12/12/17 1315 Active                      User Key     Initials Effective Dates Name Provider Type Discipline    TA 03/14/16 -  Azam Ramos, OT Occupational Therapist OT    CL 06/08/16 -  Martine Silverio, OT Occupational Therapist OT                  OT Recommendation and Plan  Anticipated Discharge Disposition: inpatient rehabilitation facility  Planned Therapy Interventions: activity intolerance, ADL retraining, balance training, bed mobility training, home exercise program, neuromuscular re-education, ROM (Range of Motion), strengthening, transfer training, visual retraining  Therapy Frequency: daily (Per priority policy)  Plan of Care Review  Plan Of Care Reviewed With: patient  Progress: improving  Outcome Summary/Follow up Plan: Pt demo improved activity tolerance by ambulating 75ft w/ Mod Ax2, though demo significant LOB to the L and requires constant assist to maintain grasp onto RW w/ L hand. Recommend cont skilled IPOT POC. Recommend pt DC to IP rehab.         Outcome Measures       12/12/17 0839 12/12/17 0835 12/11/17 0819    How much help from another person do you currently need...    Turning from your back to your side while in flat bed without using bedrails? 2  -MJ  2  -MJ    Moving from lying on back to sitting on the side of a flat bed without bedrails? 2  -MJ  2  -MJ    Moving to and from a bed to a chair (including a wheelchair)? 2  -MJ  2  -MJ    Standing up from a chair using your arms (e.g., wheelchair, bedside chair)? 2  -MJ  2  -MJ    Climbing 3-5 steps with a railing? 1  -MJ  1  -MJ    To walk in hospital room? 2  -MJ  2  -MJ    AM-PAC 6 Clicks Score 11  -MJ  11  -MJ    How much help from another is currently needed...    Putting on and taking off regular lower body clothing?  1  -CL     Bathing (including washing, rinsing, and drying)  2  -CL     Toileting (which includes using toilet bed pan or urinal)  1  -CL     Putting on  and taking off regular upper body clothing  2  -CL     Taking care of personal grooming (such as brushing teeth)  2  -CL     Eating meals  1  -CL     Score  9  -CL     Modified [place] Scale    Modified [place] Scale 4 - Moderately severe disability.  Unable to walk without assistance, and unable to attend to own bodily needs without assistance.  -MJ 4 - Moderately severe disability.  Unable to walk without assistance, and unable to attend to own bodily needs without assistance.  -CL 4 - Moderately severe disability.  Unable to walk without assistance, and unable to attend to own bodily needs without assistance.  -MJ    Functional Assessment    Outcome Measure Options AM-PAC 6 Clicks Basic Mobility (PT);Modified [place]  -MJ  AM-PAC 6 Clicks Basic Mobility (PT);Modified [place]  -MJ      12/10/17 0931 12/10/17 0930       How much help from another person do you currently need...    Turning from your back to your side while in flat bed without using bedrails? 2  -SJ      Moving from lying on back to sitting on the side of a flat bed without bedrails? 2  -SJ      Moving to and from a bed to a chair (including a wheelchair)? 2  -SJ      Standing up from a chair using your arms (e.g., wheelchair, bedside chair)? 2  -SJ      Climbing 3-5 steps with a railing? 1  -SJ      To walk in hospital room? 2  -SJ      AM-PAC 6 Clicks Score 11  -SJ      How much help from another is currently needed...    Putting on and taking off regular lower body clothing?  1  -TA     Bathing (including washing, rinsing, and drying)  2  -TA     Toileting (which includes using toilet bed pan or urinal)  1  -TA     Putting on and taking off regular upper body clothing  1  -TA     Eating meals  1  -TA     Modified [place] Scale    Modified [place] Scale 4 - Moderately severe disability.  Unable to walk without assistance, and unable to attend to own bodily needs without assistance.  -SJ 4 - Moderately severe disability.  Unable to walk without  assistance, and unable to attend to own bodily needs without assistance.  -TA     Functional Assessment    Outcome Measure Options AM-PAC 6 Clicks Basic Mobility (PT);Modified [place]  -SJ AM-PAC 6 Clicks Daily Activity (OT);Modified [place]  -TA       User Key  (r) = Recorded By, (t) = Taken By, (c) = Cosigned By    Initials Name Provider Type    SJ Yumiko Arboleda, PT Physical Therapist    TA Azam Ramos, OT Occupational Therapist    CL Martine Silverio, OT Occupational Therapist    SABI De Paz, PT Physical Therapist           Time Calculation:         Time Calculation- OT       12/12/17 1318          Time Calculation- OT    OT Start Time 0835  -CL      Total Timed Code Minutes- OT 12 minute(s)  -CL      OT Received On 12/12/17  -CL      OT Goal Re-Cert Due Date 12/20/17  -CL        User Key  (r) = Recorded By, (t) = Taken By, (c) = Cosigned By    Initials Name Provider Type    CL Martine Silverio OT Occupational Therapist           Therapy Charges for Today     Code Description Service Date Service Provider Modifiers Qty    [id number]  OT THERAPEUTIC ACT EA 15 MIN 12/12/2017 Martine Silverio OT GO 1               Martine Silverio OT  12/12/2017

## 2017-12-12 NOTE — PLAN OF CARE
Problem: Patient Care Overview (Adult)  Goal: Plan of Care Review  Outcome: Ongoing (interventions implemented as appropriate)    12/12/17 1316   Coping/Psychosocial Response Interventions   Plan Of Care Reviewed With patient   Patient Care Overview   Progress improving   Outcome Evaluation   Outcome Summary/Follow up Plan Pt demo improved activity tolerance by ambulating 75ft w/ Mod Ax2, though demo significant LOB to the L and requires constant assist to maintain grasp onto RW w/ L hand. Recommend cont skilled IPOT POC. Recommend pt DC to IP rehab.          Problem: Inpatient Occupational Therapy  Goal: Transfer Training Goal 1 LTG- OT  Outcome: Ongoing (interventions implemented as appropriate)    12/10/17 1026 12/12/17 1316   Transfer Training OT LTG   Transfer Training OT LTG, Date Established 12/10/17 --    Transfer Training OT LTG, Time to Achieve 1 wk --    Transfer Training OT LTG, Activity Type bed to chair /chair to bed;sit to stand/stand to sit;toilet --    Transfer Training OT LTG, [place] Level minimum assist (75% patient effort);2 person assist required --    Transfer Training OT LTG, Assist Device (AAD) --    Transfer Training OT LTG, Outcome --  goal ongoing       Goal: Strength Goal LTG- OT  Outcome: Ongoing (interventions implemented as appropriate)    12/10/17 1026 12/12/17 1316   Strength OT LTG   Strength Goal OT LTG, Date Established 12/10/17 --    Strength Goal OT LTG, Time to Achieve 1 wk --    Strength Goal OT LTG, Measure to Achieve (Increase LUE MMS by 1/2 MMG to support fxl I) --    Strength Goal OT LTG, Outcome --  goal ongoing       Goal: Grooming Goal LTG- OT  Outcome: Ongoing (interventions implemented as appropriate)    12/10/17 1026 12/12/17 1316   Grooming OT LTG   Grooming Goal OT LTG, Date Established 12/10/17 --    Grooming Goal OT LTG, Time to Achieve 1 wk --    Grooming Goal OT LTG, [place] Level minimum assist (75% patient effort);verbal cues required;nonverbal  cues required (demo/gesture) --    Grooming Goal OT LTG, Position sitting in chair --    Grooming Goal OT LTG, Outcome --  goal ongoing       Goal: Toileting Goal LTG- OT  Outcome: Ongoing (interventions implemented as appropriate)    12/10/17 1026 12/12/17 1316   Toileting OT LTG   Toileting Goal OT LTG, Date Established 12/10/17 --    Toileting Goal OT LTG, Time to Achieve 1 wk --    Toileting Goal OT LTG, [place] Level maximum assist (25% patient effort) --    Toileting Goal OT LTG, Outcome --  goal ongoing

## 2017-12-12 NOTE — PROGRESS NOTES
Adult Nutrition  Assessment/PES    Patient Name:  Lois Brennan  YOB: 1940  MRN: [id number]  Admit Date:  12/9/2017    Assessment Date:  12/12/2017    Comments: RD follow-up. Keofeed kinked, therefore tube feeding has not been started yet. RN reports that pt is more alert today with better speech, plan to reconsult SLP for swallow re-eval. If pt does not pass swallow eval then rec placing keofeed and initiating current tube feeding order. Will continue to follow.           Reason for Assessment       12/12/17 1349    Reason for Assessment    Reason For Assessment/Visit follow up protocol;multidisciplinary rounds;TF/PN    Time Spent (min) 45    Diagnosis --   per notes this adm   Principal Problem:    Intracranial hemorrhage  Active Problems:    Essential hypertension    Type 2 diabetes mellitus with complication, without long-term current use of insulin    Pharyngeal dysphagia    GERD (gastroesophageal reflux disease)    Dementia    Rheumatoid arthritis    Paroxysmal atrial fibrillation             Nutrition/Diet History       12/12/17 1349    Nutrition/Diet History    Reported/Observed By RN    Other keofeed kinked, have not started TF yet. Per RN- plan to re-consult SLP as pt is more alert and her speech is better today              Labs/Tests/Procedures/Meds       12/12/17 1351    Labs/Tests/Procedures/Meds    Labs/Tests Review Reviewed    Medication Review Reviewed, pertinent     Results from last 7 days  Lab Units 12/11/17  0405 12/09/17  1539   SODIUM mmol/L 138 135   POTASSIUM mmol/L 4.0 3.7   CHLORIDE mmol/L 108 102   CO2 mmol/L 22.0 27.0   BUN mg/dL 21 11   CREATININE mg/dL 1.00 1.00   CALCIUM mg/dL 8.4* 8.9   BILIRUBIN mg/dL  --  0.6   ALK PHOS U/L  --  75   ALT (SGPT) U/L  --  7   AST (SGOT) U/L  --  12   GLUCOSE mg/dL 172* 166*                Nutrition Prescription Ordered       12/12/17 1352    Nutrition Prescription PO    Current PO Diet NPO    Nutrition Prescription EN    Enteral Route  --   pending    Product Diabetisource    TF Delivery Method Continuous    Continuous TF Goal Rate (mL/hr) 65 mL/hr    Continuous TF Goal Volume (mL) 1300 mL    Water flush (mL)  25 mL    Water Flush Frequency Per hour            Evaluation of Received Nutrient/Fluid Intake       12/12/17 1355    EN Evaluation    Number of Days EN Intake Evaluated --   TF has not yet been initiated            Problem/Interventions:          Problem 2       12/12/17 1355    Nutrition Diagnoses Problem 2    Problem 2 Inadequate Nutrient Intake    Etiology (related to) --   clinical condition, enteral access    Signs/Symptoms (evidenced by) --   keofeed placement pending, RN to re-consult SLP for swallow re-eval                  Intervention Goal       12/12/17 1358    Intervention Goal    General Nutrition support treatment    TF/PN Inititiate TF/PN            Nutrition Intervention       12/12/17 1358    Nutrition Intervention    RD/Tech Action Care plan reviewd;Follow Tx progress            Nutrition Prescription       12/12/17 1358    Nutrition Prescription EN    Enteral Prescription Continue same protocol   rec starting current TF once keofeed placement verified            Education/Evaluation       12/12/17 1358    Monitor/Evaluation    Monitor Per protocol;Pertinent labs;Symptoms;TF delivery/tolerance   swallow function        Electronically signed by:  Caroline Vincent MS RD/LD CNSC  12/12/17 1:59 PM

## 2017-12-12 NOTE — PLAN OF CARE
Problem: Patient Care Overview (Adult)  Goal: Plan of Care Review  Outcome: Ongoing (interventions implemented as appropriate)    12/12/17 0924   Coping/Psychosocial Response Interventions   Plan Of Care Reviewed With patient   Patient Care Overview   Progress improving   Outcome Evaluation   Outcome Summary/Follow up Plan Pt. continues to demonstrate progress with mobility. Ambulation distance increased to 15+60' today requiring mod A secondary to L lateral lean and to maintain L hand placement on rwx. She requires and responds well to verbal cues for increase LLE step length. Will benefit from continued POC and inpatient rehab recommended at d/c.         Problem: Inpatient Physical Therapy  Goal: Transfer Training Goal 1 LTG- PT  Outcome: Ongoing (interventions implemented as appropriate)    12/10/17 1021 12/12/17 0924   Transfer Training PT LTG   Transfer Training PT LTG, Date Established 12/10/17 --    Transfer Training PT LTG, Time to Achieve 2 wks --    Transfer Training PT LTG, Activity Type bed to chair /chair to bed;sit to stand/stand to sit --    Transfer Training PT LTG, [place] Level contact guard assist --    Transfer Training PT LTG, Outcome --  goal ongoing       Goal: Gait Training Goal LTG- PT  Outcome: Ongoing (interventions implemented as appropriate)    12/10/17 1021 12/12/17 0924   Gait Training PT LTG   Gait Training Goal PT LTG, Date Established 12/10/17 --    Gait Training Goal PT LTG, Time to Achieve 2 wks --    Gait Training Goal PT LTG, [place] Level minimum assist (75% patient effort);2 person assist required --    Gait Training Goal PT LTG, Distance to Achieve 100 --    Gait Training Goal PT LTG, Outcome --  goal ongoing       Goal: Static Sitting Balance Goal LTG- PT  Outcome: Ongoing (interventions implemented as appropriate)    12/10/17 1021 12/12/17 0924   Static Sitting Balance PT LTG   Static Sitting Balance PT LTG, Date Established 12/10/17 --    Static Sitting Balance  PT LTG, Time to Achieve 2 wks --    Static Sitting Balance PT LTG, [place] Level supervision required --    Static Sitting Balance PT LTG, Assist Device UE Support --    Static Sitting Balance PT LTG, Outcome --  goal ongoing

## 2017-12-12 NOTE — PROGRESS NOTES
Discharge Planning Assessment  [facility]     Patient Name: Lois Brennan  MRN: [id number]  Today's Date: 12/12/2017    Admit Date: 12/9/2017          Discharge Needs Assessment     None            Discharge Plan       12/12/17 0831    Case Management/Social Work Plan    Additional Comments Spoke with Ms. Brennna's daughter, Keely on phone.  Her mother was living alone in [address] level home in [facility].  Her daughter lives 15 min. away.  She has never used DME, HH or Home O2.  She uses [facility] in [place] for her Rx.  Her insurance covers cost of her meds. Her daughter states she plans to take her to her home when discharged.  She does not want her to live alone now.  CM will follow for upcoming d/c needs.  May need rehab.  Will discuss needs with multi-disciplinary team.          Discharge Placement     No information found                Demographic Summary     None            Functional Status     None            Psychosocial     None            Abuse/Neglect     None            Legal     None            Substance Abuse     None            Patient Forms     None          Nnamdi Hatch RN

## 2017-12-12 NOTE — THERAPY TREATMENT NOTE
Acute Care - Physical Therapy Treatment Note  [facility]     Patient Name: Lois Brennan  : 1940  MRN: [id number]  Today's Date: 2017  Onset of Illness/Injury or Date of Surgery Date: 17  Date of Referral to PT: 17  Referring Physician: Dr. Espitia    Admit Date: 2017    Visit Dx:    ICD-10-CM ICD-9-CM   1. Intracranial hematoma, right, without loss of consciousness, initial encounter S06.340A 853.01   2. Impaired mobility and ADLs Z74.09 799.89   3. Impaired functional mobility, balance, gait, and endurance Z74.09 V49.89     Patient Active Problem List   Diagnosis   • Intracranial hemorrhage   • Essential hypertension   • Type 2 diabetes mellitus with complication, without long-term current use of insulin   • Pharyngeal dysphagia   • GERD (gastroesophageal reflux disease)   • Dementia   • Rheumatoid arthritis   • Paroxysmal atrial fibrillation               Adult Rehabilitation Note       17 0839 17 0819       Rehab Assessment/Intervention    Discipline physical therapist  -MJ physical therapist  -MJ     Document Type therapy note (daily note)  -MJ therapy note (daily note)  -MJ     Subjective Information agree to therapy;no complaints  -MJ agree to therapy;complains of;pain   R shoulder  -MJ     Patient Effort, Rehab Treatment good  -MJ good  -MJ     Symptoms Noted During/After Treatment significant change in vital signs   monitor HR throughout  -MJ significant change in vital signs   elevated HR  -MJ     Symptoms Noted Comment RN informed and completed ambulation with PT/OT and pt  -MJ      Precautions/Limitations  fall precautions;other (see comments)   L neglect  -MJ     Recorded by [MJ] Hamida De Paz, PT [MJ] Hamida De Paz, PT     Vital Signs    Pre Systolic BP Rehab 130  -  -MJ     Pre Treatment Diastolic BP 65  -MJ 54  -MJ     Post Systolic BP Rehab 130  -  -MJ     Post Treatment Diastolic BP 55  -MJ 64  -MJ     Pretreatment Heart Rate  (beats/min)  109  -MJ     Intratreatment Heart Rate (beats/min) 160   questionable accuracy  -  -MJ     Posttreatment Heart Rate (beats/min)  110  -MJ     Pre SpO2 (%) 98  -MJ 97  -MJ     O2 Delivery Pre Treatment room air  -MJ room air  -MJ     Post SpO2 (%) 96  -MJ 97  -MJ     O2 Delivery Post Treatment room air  -MJ room air  -MJ     Pre Patient Position Sitting  -MJ Sitting  -MJ     Intra Patient Position Standing  -MJ Standing  -MJ     Post Patient Position Sitting  -MJ Sitting  -MJ     Recorded by [MJ] Hamida De Paz, PT [MJ] Hamida De Paz, PT     Pain Assessment    Pain Assessment 0-10  -MJ 0-10  -MJ     Pain Score 0  -MJ 5  -MJ     Post Pain Score 0  -MJ 5  -MJ     Pain Location  Shoulder   Right  -MJ     Pain Intervention(s) Repositioned;Ambulation/increased activity  -MJ Ambulation/increased activity;Repositioned  -MJ     Response to Interventions tolerated  -MJ tolerated  -MJ     Recorded by [MJ] Hamida De Paz, PT [MJ] Hamida De Paz PT     Vision Assessment/Intervention    Visual Impairment decreased visual tracking   able to track to midline and slightly beyond with VCs  -MJ decreased tracking across midline;decreased visual tracking   decreased tracking to L  -MJ     Visual Impairment Comment verbal and visual cues to track to midline and slightly beyond  -MJ      Recorded by [MJ] Hamida De Paz, PT [MJ] Hamida De Paz PT     Cognitive Assessment/Intervention    Current Cognitive/Communication Assessment impaired  -MJ impaired  -MJ     Orientation Status oriented to;person;place;disoriented to;time  -MJ oriented to;person;place;required verbal cueing (specifiy in comments);disoriented to;time   VCs for name of hospital  -MJ     Follows Commands/Answers Questions able to follow single-step instructions;75% of the time;needs increased time;needs cueing  -MJ able to follow single-step instructions;50% of the time;needs cueing;needs increased time;needs repetition  -MJ     Personal Safety  moderate impairment;decreased awareness, need for assist;decreased awareness, need for safety;decreased insight to deficits  - moderate impairment;decreased awareness, need for assist;decreased awareness, need for safety  -     Personal Safety Interventions fall prevention program maintained;gait belt;muscle strengthening facilitated;nonskid shoes/slippers when out of bed  -MJ fall prevention program maintained;gait belt;muscle strengthening facilitated;nonskid shoes/slippers when out of bed  -MJ     Recorded by [MJ] Hamida De Paz, PT [MJ] Hamida De Paz, PT     Bed Mobility, Assessment/Treatment    Bed Mob, Supine to Sit, [place] not tested  -MJ not tested  -MJ     Bed Mob, Sit to Supine, [place] not tested  -MJ      Bed Mobility, Comment UIC  -MJ UIC  -MJ     Recorded by [MJ] Hamida De Paz, PT [MJ] Hamida De Paz PT     Transfer Assessment/Treatment    Transfers, Sit-Stand [place] moderate assist (50% patient effort);verbal cues required  - moderate assist (50% patient effort)   initially with posterior lean; verbal & tactile cues needed  -     Transfers, Stand-Sit [place] moderate assist (50% patient effort);verbal cues required  -MJ moderate assist (50% patient effort)  -     Transfers, Sit-Stand-Sit, Assist Device rolling walker   A for LUE placement and to maintain  - rolling walker   gait belt  -     Transfer, Safety Issues step length decreased;balance decreased during turns;sequencing ability decreased  -      Transfer, Impairments strength decreased;impaired balance  -      Transfer, Comment sit to stand from bedside chair; VC's for hand positioning.  Initially posterior lean upon coming to stand.  - attempted side steps to L for pivot to [facility] but unable to initiate side steps completing forward steps.  Commode brought behind patient  -MJ     Recorded by [MJ] Hamida De Paz, PT [MJ] Hamida De Paz, PT     Gait Assessment/Treatment    Gait, [place] Level  moderate assist (50% patient effort);1 person + 1 person to manage equipment;verbal cues required   VC's to increase LLE step length  -MJ moderate assist (50% patient effort);1 person + 1 person to manage equipment   chair follow  -MJ     Gait, Assistive Device rolling walker  -MJ rolling walker  -MJ     Gait, Distance (Feet) 75   15+60; seated rest break between  -MJ 15  -MJ     Gait, Gait Pattern Analysis swing-to gait   progressed to swing through LLE with VCs  -MJ swing-to gait  -MJ     Gait, Gait Deviations bhupinder decreased;left:;decreased heel strike;step length decreased  -MJ bhupinder decreased;double stance time increased;decreased heel strike  -MJ     Gait, Safety Issues step length decreased;balance decreased during turns  -MJ balance decreased during turns;sequencing ability decreased;step length decreased;weight-shifting ability decreased  -MJ     Gait, Impairments strength decreased;impaired balance  -MJ strength decreased;impaired balance  -MJ     Gait, Comment requires A to maintain LUE hand placement on rwx; L lateral lean noted; responds well to VCs for increased LLE step length and to look ahead  -MJ elevated HR limited distance  -MJ     Recorded by [MJ] Hamida De Paz, PT [MJ] Hamida De Paz, PT     Motor Skills/Interventions    Additional Documentation Balance Skills Training (Group)  -MJ      Recorded by [MJ] Hamida De Paz, PT      Balance Skills Training    Sitting-Level of Assistance Contact guard  -MJ Contact guard  -MJ     Sitting-Balance Support Feet supported;Right upper extremity supported  -MJ Feet supported;Right upper extremity supported  -MJ     Sitting-Balance Activities Trunk control activities  -MJ      Standing-Level of Assistance Moderate assistance  -MJ Moderate assistance  -MJ     Static Standing Balance Support assistive device  -MJ assistive device  -MJ     Standing-Balance Activities Weight Shift R-L;Weight Shift A-P;Forward lean   forward lean to obtain center of  balance due to post lean   -MJ Weight Shift R-L;Weight Shift A-P  -MJ     Gait Balance-Level of Assistance Moderate assistance  - Moderate assistance  -     Gait Balance Support assistive device   req A to maintain LUE hand placement  - assistive device  -     Gait Balance Activities scanning environment R/L  -MJ      Recorded by [MJ] Hamida De Paz, PT [MJ] Hamida De Paz, PT     Therapy Exercises    Bilateral Lower Extremities sitting;AROM:;10 reps;ankle pumps/circles;LAQ  -MJ sitting;AROM:;10 reps;ankle pumps/circles;LAQ  -MJ     Right Upper Extremity  sitting;AROM:;10 reps;elbow flexion/extension;shoulder extension/flexion  -MJ     Left Upper Extremity  sitting;AAROM:;elbow flexion/extension;shoulder extension/flexion  -MJ     Recorded by [MJ] Hamida De Paz, PT [MJ] Hamida De Paz, PT     Positioning and Restraints    Pre-Treatment Position sitting in chair/recliner  -MJ sitting in chair/recliner  -MJ     Post Treatment Position chair  -MJ chair  -MJ     In Chair reclined;notified nsg;call light within reach;encouraged to call for assist;exit alarm on;legs elevated  -MJ notified nsg;sitting;call light within reach;encouraged to call for assist;exit alarm on;LUE elevated;legs elevated;heels elevated  -MJ     Recorded by [MJ] Hamida De Paz, PT [MJ] Hamida De Paz, PT       User Key  (r) = Recorded By, (t) = Taken By, (c) = Cosigned By    Initials Name Effective Dates     Hamida De Paz, PT 10/30/17 -                 IP PT Goals       12/12/17 0924 12/11/17 0908 12/10/17 1021    Transfer Training PT LTG    Transfer Training PT LTG, Date Established   12/10/17  -SJ    Transfer Training PT LTG, Time to Achieve   2 wks  -SJ    Transfer Training PT LTG, Activity Type   bed to chair /chair to bed;sit to stand/stand to sit  -SJ    Transfer Training PT LTG, [place] Level   contact guard assist  -SJ    Transfer Training PT LTG, Outcome goal ongoing  -MJ goal ongoing  -MJ goal ongoing  -SJ    Gait  Training PT LTG    Gait Training Goal PT LTG, Date Established   12/10/17  -SJ    Gait Training Goal PT LTG, Time to Achieve   2 wks  -SJ    Gait Training Goal PT LTG, [place] Level   minimum assist (75% patient effort);2 person assist required  -SJ    Gait Training Goal PT LTG, Distance to Achieve   100  -SJ    Gait Training Goal PT LTG, Outcome goal ongoing  -MJ goal ongoing  -MJ goal ongoing  -SJ    Static Sitting Balance PT LTG    Static Sitting Balance PT LTG, Date Established   12/10/17  -SJ    Static Sitting Balance PT LTG, Time to Achieve   2 wks  -SJ    Static Sitting Balance PT LTG, [place] Level   supervision required  -SJ    Static Sitting Balance PT LTG, Assist Device   UE Support  -SJ    Static Sitting Balance PT LTG, Outcome goal ongoing  -MJ goal ongoing  -MJ goal ongoing  -SJ      User Key  (r) = Recorded By, (t) = Taken By, (c) = Cosigned By    Initials Name Provider Type    QUE Arboleda, PT Physical Therapist    SABI De Paz, PT Physical Therapist          Physical Therapy Education     Title: PT OT SLP Therapies (Active)     Topic: Physical Therapy (Active)     Point: Mobility training (Active)    Learning Progress Summary    Learner Readiness Method Response Comment Documented by Status   Patient Acceptance E NR  MJ 12/12/17 0924 Active    Acceptance E NR   12/11/17 0908 Active    Acceptance E NR   12/10/17 1025 Active               Point: Home exercise program (Active)    Learning Progress Summary    Learner Readiness Method Response Comment Documented by Status   Patient Acceptance E NR  MJ 12/12/17 0924 Active    Acceptance E NR   12/11/17 0908 Active    Acceptance E NR   12/10/17 1025 Active               Point: Body mechanics (Active)    Learning Progress Summary    Learner Readiness Method Response Comment Documented by Status   Patient Acceptance E NR  MJ 12/12/17 0924 Active    Acceptance E NR  MJ 12/11/17 0908 Active    Acceptance E NR   12/10/17 1025  Active               Point: Precautions (Active)    Learning Progress Summary    Learner Readiness Method Response Comment Documented by Status   Patient Acceptance E NR  MJ 12/12/17 0924 Active    Acceptance E NR  MJ 12/11/17 0908 Active    Acceptance E NR  SJ 12/10/17 1025 Active                      User Key     Initials Effective Dates Name Provider Type Discipline     06/19/15 -  Yumiko Arboleda, PT Physical Therapist PT     10/30/17 -  Hamida De Paz, PT Physical Therapist PT                    PT Recommendation and Plan  Anticipated Equipment Needs At Discharge: other (see comments) (TBD)  Anticipated Discharge Disposition: inpatient rehabilitation facility  Planned Therapy Interventions: balance training, bed mobility training, gait training, home exercise program, neuromuscular re-education, patient/family education, strengthening, transfer training  PT Frequency: daily, per priority policy  Plan of Care Review  Plan Of Care Reviewed With: patient  Progress: improving  Outcome Summary/Follow up Plan: Pt. continues to demonstrate progress with mobility.  Ambulation distance increased to 15+60' today requiring mod A secondary to L lateral lean and to maintain L hand placement on rwx.  She requires and responds well to verbal cues for increase LLE step length.  Will benefit from continued POC and inpatient rehab recommended at d/c.          Outcome Measures       12/12/17 0839 12/11/17 0819 12/10/17 0931    How much help from another person do you currently need...    Turning from your back to your side while in flat bed without using bedrails? 2  -MJ 2  -MJ 2  -SJ    Moving from lying on back to sitting on the side of a flat bed without bedrails? 2  -MJ 2  -MJ 2  -SJ    Moving to and from a bed to a chair (including a wheelchair)? 2  -MJ 2  -MJ 2  -SJ    Standing up from a chair using your arms (e.g., wheelchair, bedside chair)? 2  -MJ 2  -MJ 2  -SJ    Climbing 3-5 steps with a railing? 1  -MJ 1  -MJ 1   -SJ    To walk in hospital room? 2  -MJ 2  -MJ 2  -SJ    AM-PAC 6 Clicks Score 11  -MJ 11  -MJ 11  -SJ    Modified [place] Scale    Modified [place] Scale 4 - Moderately severe disability.  Unable to walk without assistance, and unable to attend to own bodily needs without assistance.  -MJ 4 - Moderately severe disability.  Unable to walk without assistance, and unable to attend to own bodily needs without assistance.  -MJ 4 - Moderately severe disability.  Unable to walk without assistance, and unable to attend to own bodily needs without assistance.  -    Functional Assessment    Outcome Measure Options AM-PAC 6 Clicks Basic Mobility (PT);Modified Lares  - AM-PAC 6 Clicks Basic Mobility (PT);Modified [place]  - AM-PAC 6 Clicks Basic Mobility (PT);Modified Lares  -      12/10/17 0930          How much help from another is currently needed...    Putting on and taking off regular lower body clothing? 1  -TA      Bathing (including washing, rinsing, and drying) 2  -TA      Toileting (which includes using toilet bed pan or urinal) 1  -TA      Putting on and taking off regular upper body clothing 1  -TA      Eating meals 1  -TA      Modified [place] Scale    Modified [place] Scale 4 - Moderately severe disability.  Unable to walk without assistance, and unable to attend to own bodily needs without assistance.  -TA      Functional Assessment    Outcome Measure Options AM-PAC 6 Clicks Daily Activity (OT);Modified [place]  -TA        User Key  (r) = Recorded By, (t) = Taken By, (c) = Cosigned By    Initials Name Provider Type     Yumiko Arboleda, PT Physical Therapist    BECCA Ramos, OT Occupational Therapist    SABI De Paz, MAC Physical Therapist           Time Calculation:         PT Charges       12/12/17 0928          Time Calculation    Start Time 0839  -MJ      PT Received On 12/12/17  -      PT Goal Re-Cert Due Date 12/20/17  -      Time Calculation- PT    Total Timed Code Minutes- PT 25  minute(s)  -SABI        User Key  (r) = Recorded By, (t) = Taken By, (c) = Cosigned By    Initials Name Provider Type    SABI De Paz, PT Physical Therapist          Therapy Charges for Today     Code Description Service Date Service Provider Modifiers Qty    00783473696 HC PT THER PROC EA 15 MIN 12/11/2017 Hamida De Paz, PT GP 2    87368606009 HC PT THER SUPP EA 15 MIN 12/11/2017 Hamida De Paz, PT GP 2    76992328215 HC PT THER PROC EA 15 MIN 12/12/2017 Hamida DeP az, PT GP 2          PT G-Codes  Outcome Measure Options: AM-PAC 6 Clicks Basic Mobility (PT), Shabbir De Paz, MAC  12/12/2017

## 2017-12-12 NOTE — PROGRESS NOTES
"NEUROSURGERY PROGRESS NOTE     LOS: 3 days   Patient Care Team:  No Known Provider as PCP - General    Chief Complaint: Left-sided weakness and neglect.    Interval History:   Patient Complaints: Some frontal headache.  Patient Denies: Nausea or vomiting.    Review of Systems:   Musculoskeletal and Neurological systems were reviewed and are negative except for:  Neurological: positive for headaches and weakness    Vital Signs: Blood pressure 154/64, pulse 86, temperature 98.6 °F (37 °C), temperature source Oral, resp. rate 16, height 157.5 cm (62\"), weight 67.8 kg (149 lb 7.6 oz), SpO2 94 %.  Intake/Output:   Intake/Output Summary (Last 24 hours) at 12/12/17 0628  Last data filed at 12/11/17 2200   Gross per 24 hour   Intake           1304.6 ml   Output              400 ml   Net            904.6 ml       Physical Exam:  The patient is awake and sitting up in bed.  She is oriented to \"my home\" and to [place].  She follow simple commands.  Her speech is fluent.    She continues to have left-sided neglect but now will turn her head more to the left.  She continues to have some right gaze deviation or preference.  Left hemiparesis is noted.     Data Review:      Results from last 7 days  Lab Units 12/12/17  0434   WBC 10*3/mm3 8.66   HEMOGLOBIN g/dL 10.9*   HEMATOCRIT % 33.3*   PLATELETS 10*3/mm3 201         Assessment/Plan:  1.  Multiple areas of intracranial hemorrhage with the largest being a right frontal ICH.  Multiple areas of hemorrhage potentially do to hemorrhagic conversion of embolic infarcts. Cardiac echo demonstrated atrial fibrillation.  2.  Atrial fibrillation.  3.  Dysphagia: Tube feeds to be placed.  4.  Hypertension.  5.  Disposition: Continue supportive care and observation.      Leo Proctor MD  12/12/17  6:28 AM    "

## 2017-12-12 NOTE — PLAN OF CARE
Problem: Patient Care Overview (Adult)  Goal: Plan of Care Review  Outcome: Ongoing (interventions implemented as appropriate)    12/12/17 1644   Coping/Psychosocial Response Interventions   Plan Of Care Reviewed With patient   Outcome Evaluation   Outcome Summary/Follow up Plan Dysphagia: Showing significant improvement at the bedside compared to yesetrday. Keofeed not working. FEES: Significant improvement compared to yesterday. Inconsistent aspiration with thin liquids during the swallow 2' mistimed swallow sequence. Use of small single sip helped though due to fatigue factor, too high aspiration risk. Inconsistent sensation with aspiration. No penetration or aspiration with nectar-thick liquids. Mild diffuse residue with all consistencies. DNT solids per pt preference. Recommend: Dysphagia Level 3 puree with some soft, nectar-thick liquids, small bites/sips, ok with straws (place on R side of mouth). Pills in applesauce, crush prn. Check mouth for pocketing. Will continue to follow for tx.     Participated well in cog-comm tx. Still some L neglect though able to complete word search puzzles. Now that pt has returned to PO diet, will further assess higher-level cog-comm skills as was independendtly manageing household prior to admission (though do note dtr plans for pt to live with her after this). Will follow.          Problem: Stroke (Hemorrhagic) (Adult)  Goal: Signs and Symptoms of Listed Potential Problems Will be Absent or Manageable (Stroke)  Outcome: Ongoing (interventions implemented as appropriate)    Problem: Inpatient SLP  Goal: Dysphagia- Patient will improve swallowing skills to begin to take some PO safely  Outcome: Outcome(s) achieved Date Met:  12/12/17 12/11/17 1008 12/12/17 1644   Begin to Take Some PO Safely   Begin to Take Some PO Safely- SLP, Date Established 12/11/17 --    Begin to Take Some PO Safely- SLP, Time to Achieve by discharge --    Begin to Take Some PO Safely- SLP, Date Goal  Reviewed 12/11/17 --    Begin to Take Some PO Safely- SLP, Outcome --  goal met       Goal: Dysphagia- Patient will safely consume diet as per recommendation with no signs/symptoms of aspiration  Outcome: Ongoing (interventions implemented as appropriate)    12/12/17 1644   Safely Consume Diet   Safely Consume Diet- SLP, Date Established 12/12/17   Safely Consume Diet- SLP, Time to Achieve by discharge   Safely Consume Diet- SLP, Outcome goal ongoing       Goal: Cognitive-linguistic-Patient will improve Cognitive-linguistic skills to allow optimal participation in care  Outcome: Ongoing (interventions implemented as appropriate)    12/12/17 1644   Cognitive Linguistic- Optimal Participation in Care   Cognitive Linguistic Optimal Participation in Care- SLP, Date Established 12/11/17   Cognitive Linguistic Optimal Participation in Care- SLP, Time to Achieve by discharge   Cognitive Linguistic Optimal Participation in Care- SLP, Outcome goal ongoing

## 2017-12-12 NOTE — PROGRESS NOTES
Pulmonary/Critical Care Follow-up     LOS: 3 days   Patient Care Team:  No Known Provider as PCP - General        Chief Complaint   Patient presents with   • Stroke     Subjective    77-year-old woman with a history of diabetes, RA on chronic low dose prednisone, HTN who presented with left sided weakness, right frontal lobe hemorrhage with edema. She had a preceding episode of transient facial weakness earlier this week.  CT scan revealed a 3.7 x 3.8 cm frontal hemorrhage with some right to left shift and a smaller posterior and superior hemorrhage.    Patient had atrial fibrillation during her echocardiogram.      Interval History:   Patient is doing much better today.  She was able to ambulate a bit with physical therapy.  She passed a FEES with speech therapy and by mouth diet was started.  The patient's daughter is present today.  She reports that her  is actually in the hospital on 6B and would like the patient transferred there when she is rating her out of the ICU if possible.  She continues to have some arrhythmias though her rate is better controlled on diltiazem.  No headache, nausea, or vomiting.    History taken from: Chart/patient    PMH/FH/Social History were reviewed and updated appropriately in the electronic medical record.     Review of Systems:    Review of 14 systems was completed with positives and pertinent negatives noted in the subjective section.  All other systems reviewed and are negative.   Exceptions are noted below:    Unable to obtain secondary to confusion      Objective     Vital Signs  Temp:  [98.3 °F (36.8 °C)-98.6 °F (37 °C)] 98.6 °F (37 °C)  Heart Rate:  [] 104  Resp:  [16-20] 16  BP: (108-168)/() 130/65  12/11 0701 - 12/12 0700  In: 1304.6 [I.V.:1304.6]  Out: 700 [Urine:700]  Body mass index is 27.34 kg/(m^2).     Physical Exam:     Constitutional:    Alert, cooperative, in no acute distress   Head:    Normocephalic, left facial droop    Eyes:            Lids  and lashes normal, conjunctivae and sclerae normal, no   icterus, no pallor, corneas clear, PER   ENMT:   Ears appear intact with no abnormalities noted      No oral lesions, no thrush, oral mucosa moist   Neck:   No adenopathy, supple, trachea midline, no thyromegaly, no JVD       Lungs/Resp:     Normal effort, symmetric chest rise, no crepitus, clear to      auscultation bilaterally, no chest wall tenderness                Heart/CV:    Tachycardic, irregularly irregular, normal S1 and S2, no            murmur   Abdomen/GI:     Normal bowel sounds, no masses, no organomegaly, soft,        non-tender, non-distended   :     Deferred   Extremities/MSK:   No clubbing or cyanosis.  No edema.  Normal tone.  No deformities.    Pulses:   Pulses palpable and equal bilaterally   Skin:   No bleeding, bruising or rash   Heme/Lymph:   No cervical or supraclavicular adenopathy.    Neurologic:    Psychiatric:       Left side weakness 1/5 LUE  3/5 LLE, left facial droop.      Anxious.             Results Review:     I reviewed the patient's new clinical results.     Results from last 7 days  Lab Units 12/11/17  0405 12/09/17  1539   SODIUM mmol/L 138 135   POTASSIUM mmol/L 4.0 3.7   CHLORIDE mmol/L 108 102   CO2 mmol/L 22.0 27.0   BUN mg/dL 21 11   CREATININE mg/dL 1.00 1.00   CALCIUM mg/dL 8.4* 8.9   BILIRUBIN mg/dL  --  0.6   ALK PHOS U/L  --  75   ALT (SGPT) U/L  --  7   AST (SGOT) U/L  --  12   GLUCOSE mg/dL 172* 166*       Results from last 7 days  Lab Units 12/12/17  0434 12/11/17  0405 12/10/17  0542   WBC 10*3/mm3 8.66 10.60 5.37   HEMOGLOBIN g/dL 10.9* 11.2* 12.2   HEMATOCRIT % 33.3* 34.8 36.3   PLATELETS 10*3/mm3 201 209 246           Results from last 7 days  Lab Units 12/11/17  0405   MAGNESIUM mg/dL 2.0   PHOSPHORUS mg/dL 3.4       Results from last 7 days  Lab Units 12/10/17  0542   HEMOGLOBIN A1C % 7.00*       I reviewed the patient's new imaging including images and reports.    CT head 12/10/17:  IMPRESSION:  No  interval change is noted in the intra-axial and  extracerebral bleed complex described above. There is no evidence of  progressive hemorrhage, progressive edema or progressive mass effect.    CT head 12/9/17:  IMPRESSION:      Intra-axial cerebral bleed, right frontal parietal, with surrounding  edema and mass effect. Measurements and volumetric measurements are  offered above.      There is a 14 mm high attenuation structure adjacent to the posterior  right falx cerebri which could be a densely calcified meningioma or  second hemorrhage.      There is a small area of subarachnoid bleed along the right parietal  convexity in the sulcal GYRAL recesses.      There is considerable mass effect in the right hemisphere.    Echocardiogram 12/9/17:  · Left ventricular systolic function is normal. Estimated EF = 70%.  · Normal right ventricular cavity size, wall thickness, systolic function and septal motion noted.  · Saline test results are negative.  · The aortic valve exhibits sclerosis.  · No evidence of pulmonary hypertension is present.  · There is no evidence of pericardial effusion.  · Atrial fibrillation noted as primary rhythm.      Medication Review:     famotidine 20 mg Intravenous Q12H   insulin regular 0-9 Units Subcutaneous Q6H       diltiaZEM 5-15 mg/hr Last Rate: Stopped (12/11/17 5322)   niCARdipine 5-15 mg/hr Last Rate: 2.5 mg/hr (12/12/17 0806)   phenylephrine 0.5-3 mcg/kg/min        Assessment/Plan     Principal Problem:    Intracranial hemorrhage  Active Problems:    Essential hypertension    Type 2 diabetes mellitus with complication, without long-term current use of insulin    Pharyngeal dysphagia    GERD (gastroesophageal reflux disease)    Dementia    Rheumatoid arthritis    Paroxysmal atrial fibrillation      Plan:  1.  Advance diet per speech recommendations, dysphagia level III.  A with some soft, nectar thick liquids, small bites/sips okay with straws, pills in applesauce crushed as needed.  2.   Okay to telemetry/hospitalists.  3.  Cardiology evaluation tomorrow for new onset atrial fibrillation.  4.  Start po Cardizem for atrial fibrillation.  Wean diltiazem drip.  5.  Physical therapy.  6.  Occupational therapy  7.  Speech therapy.  8.  Cardiology evaluation tomorrow for new onset atrial fibrillation.  9.  Restart home prednisone 7.5 mg daily.     Aquiles Silva MD  12/12/17  9:01 AM      *. Please note that portions of this note were completed with a voice recognition program. Efforts were made to edit the dictations, but occasionally words are mistranscribed.

## 2017-12-12 NOTE — MBS/VFSS/FEES
Acute Care - Speech Language Pathology Treatment Note  [facility]   + Fiberoptic Endoscopic Evaluation of Swallowing (FEES)     Patient Name: Lois Brennan  : 1940  MRN: [id number]  Today's Date: 2017         Admit Date: 2017    Visit Dx:      ICD-10-CM ICD-9-CM   1. Intracranial hematoma, right, without loss of consciousness, initial encounter S06.340A 853.01   2. Impaired mobility and ADLs Z74.09 799.89   3. Impaired functional mobility, balance, gait, and endurance Z74.09 V49.89     Patient Active Problem List   Diagnosis   • Intracranial hemorrhage   • Essential hypertension   • Type 2 diabetes mellitus with complication, without long-term current use of insulin   • Pharyngeal dysphagia   • GERD (gastroesophageal reflux disease)   • Dementia   • Rheumatoid arthritis   • Paroxysmal atrial fibrillation              Adult Rehabilitation Note       17 1100 17 0839 17 0835    Rehab Assessment/Intervention    Discipline speech language pathologist  -SM physical therapist  -MJ occupational therapist  -CL    Document Type re-evaluation;therapy note (daily note)  - therapy note (daily note)  - therapy note (daily note)  -CL    Subjective Information  agree to therapy;no complaints  -MJ agree to therapy;no complaints  -CL    Patient Effort, Rehab Treatment  good  -MJ excellent  -CL    Symptoms Noted During/After Treatment  significant change in vital signs   monitor HR throughout  - significant change in vital signs  -CL    Symptoms Noted Comment  RN informed and completed ambulation with PT/OT and pt  -MJ Noted increased HR throughout, RN aware and present.   -CL    Precautions/Limitations   fall precautions;other (see comments)   L sided weakness/numbness, L neglect  -CL    Recorded by [] Violet Hidalgo MS CCC-SLP [MJ] Hamida De Paz, PT [CL] Martine Silverio OT    Vital Signs    Pre Systolic BP Rehab  130  -  -CL    Pre Treatment Diastolic BP  65  -MJ 65  -CL     Post Systolic BP Rehab  130  -  -CL    Post Treatment Diastolic BP  55  -MJ 55  -CL    Pretreatment Heart Rate (beats/min)   105  -CL    Intratreatment Heart Rate (beats/min)  160   questionable accuracy  -   Pt w/ noted Paulino RN present and aware  -CL    Pre SpO2 (%)  98  -MJ 98  -CL    O2 Delivery Pre Treatment  room air  -MJ room air  -CL    Post SpO2 (%)  96  -MJ 96  -CL    O2 Delivery Post Treatment  room air  -MJ room air  -CL    Pre Patient Position  Sitting  -MJ Sitting  -CL    Intra Patient Position  Standing  -MJ Standing  -CL    Post Patient Position  Sitting  -MJ Standing  -CL    Recorded by  [MJ] Hamida De Paz, PT [CL] Martine Silverio OT    Pain Assessment    Pain Assessment  0-10  -MJ 0-10  -CL    Pain Score  0  -MJ 0  -CL    Post Pain Score  0  -MJ 0  -CL    Pain Intervention(s)  Repositioned;Ambulation/increased activity  -MJ Repositioned;Ambulation/increased activity  -CL    Response to Interventions  tolerated  -MJ Tolerated.   -CL    Recorded by  [MJ] Hamida De Paz, PT [CL] Martine Silverio OT    Vision Assessment/Intervention    Visual Impairment  decreased visual tracking   able to track to midline and slightly beyond with VCs  -MJ left neglect  -CL    Visual Impairment Comment  verbal and visual cues to track to midline and slightly beyond  -MJ Pt demo tracking to L this date w/ MAX VCs/TCs.   -CL    Recorded by  [MJ] Hamida De Paz, PT [CL] Martine Silverio OT    Cognitive Assessment/Intervention    Current Cognitive/Communication Assessment  impaired  -MJ impaired  -CL    Orientation Status  oriented to;person;place;disoriented to;time  -MJ oriented to;person;place;disoriented to;time  -CL    Follows Commands/Answers Questions  able to follow single-step instructions;75% of the time;needs increased time;needs cueing  -MJ 75% of the time;needs cueing;needs increased time;needs repetition  -CL    Personal Safety  moderate impairment;decreased awareness, need for assist;decreased  awareness, need for safety;decreased insight to deficits  -MJ moderate impairment;decreased awareness, need for assist;decreased awareness, need for safety;decreased insight to deficits  -CL    Personal Safety Interventions  fall prevention program maintained;gait belt;muscle strengthening facilitated;nonskid shoes/slippers when out of bed  -MJ fall prevention program maintained;gait belt;nonskid shoes/slippers when out of bed  -CL    Additional Documentation Cognitive Assessment Intervention (Group)  -SM      Recorded by [SM] Violet Hidalgo MS CCC-SLP [MJ] Hamida De Paz, PT [CL] Martine Silverio OT    Cognitive Assessment Intervention    Pragmatics flat affect;initiation problems;topic maintenance problems;poor eye contact  -SM      Organization other (see comments)   successfully completed word search puzzle  -SM      Recorded by [SM] Violet Hidalgo MS CCC-SLP      Improve articulation    Improve articulation: by over-articulating at word level;by over-articulating at phrase level  -SM      Status: Improve articulation Progressing as expected  -SM      Articulation Progress 70%;with inconsistent cues  -SM      Recorded by [SM] Violet Hidalgo MS CCC-SLP      Bed Mobility, Assessment/Treatment    Bed Mob, Supine to Sit, Burke  not tested  -     Bed Mob, Sit to Supine, Burke  not tested  -     Bed Mobility, Comment  UIC  - UIC.   -CL    Recorded by  [MJ] Hamida De Paz, PT [CL] Martine Silverio OT    Transfer Assessment/Treatment    Transfers, Sit-Stand Burke  moderate assist (50% patient effort);verbal cues required  -MJ moderate assist (50% patient effort);2 person assist required;verbal cues required  -CL    Transfers, Stand-Sit Burke  moderate assist (50% patient effort);verbal cues required  -MJ moderate assist (50% patient effort);2 person assist required;verbal cues required  -CL    Transfers, Sit-Stand-Sit, Assist Device  rolling walker   A for LUE placement and  to maintain  -MJ rolling walker  -CL    Transfer, Safety Issues  step length decreased;balance decreased during turns;sequencing ability decreased  -MJ     Transfer, Impairments  strength decreased;impaired balance  -MJ     Transfer, Comment  sit to stand from bedside chair; VC's for hand positioning.  Initially posterior lean upon coming to stand.  -MJ Pt performed x2 reps, noted intial posterior lean. VCs and assist to place L hand onto RW.   -CL    Recorded by  [MJ] Hamida De Paz, PT [CL] Martine Silverio OT    Gait Assessment/Treatment    Gait, [place] Level  moderate assist (50% patient effort);1 person + 1 person to manage equipment;verbal cues required   VC's to increase LLE step length  -MJ     Gait, Assistive Device  rolling walker  -MJ     Gait, Distance (Feet)  75   15+60; seated rest break between  -MJ     Gait, Gait Pattern Analysis  swing-to gait   progressed to swing through LLE with VCs  -MJ     Gait, Gait Deviations  bhupinder decreased;left:;decreased heel strike;step length decreased  -MJ     Gait, Safety Issues  step length decreased;balance decreased during turns  -MJ     Gait, Impairments  strength decreased;impaired balance  -MJ     Gait, Comment  requires A to maintain LUE hand placement on rwx; L lateral lean noted; responds well to VCs for increased LLE step length and to look ahead  -MJ     Recorded by  [MJ] Hamida De Paz, PT     Functional Mobility    Functional Mobility- Ind. Level   moderate assist (50% patient effort);2 person assist required;verbal cues required  -CL    Functional Mobility- Device   rolling walker  -CL    Functional Mobility-Distance (Feet)   75  -CL    Functional Mobility- Comment   Pt required 1 seated rest break. Pt requires constant assist to maintain grasp onto RW w/ L hand and assist to guide RW. Pt w/ significant L lateral LOB requiring Mod A for safety.   -CL    Recorded by   [CL] Martine Silverio OT    Motor Skills/Interventions    Additional Documentation   Balance Skills Training (Group)  -MJ Balance Skills Training (Group)  -CL    Recorded by  [MJ] Hamida De Paz, PT [CL] Martine Silverio OT    Balance Skills Training    Sitting-Level of Assistance  Contact guard  -MJ Contact guard  -CL    Sitting-Balance Support  Feet supported;Right upper extremity supported  -MJ Right upper extremity supported;Feet supported  -CL    Sitting-Balance Activities  Trunk control activities  -MJ Forward lean;Trunk control activities  -CL    Standing-Level of Assistance  Moderate assistance  -MJ Moderate assistance  -CL    Static Standing Balance Support  assistive device  -MJ assistive device  -CL    Standing-Balance Activities  Weight Shift R-L;Weight Shift A-P;Forward lean   forward lean to obtain center of balance due to post lean   -MJ Weight Shift A-P;Weight Shift R-L  -CL    Gait Balance-Level of Assistance  Moderate assistance  -MJ Moderate assistance  -CL    Gait Balance Support  assistive device   req A to maintain LUE hand placement  - assistive device  -CL    Gait Balance Activities  scanning environment R/L  -MJ scanning environment R/L  -CL    Recorded by  [MJ] Hamida De Paz, PT [CL] Martine Silverio OT    Therapy Exercises    Bilateral Lower Extremities  sitting;AROM:;10 reps;ankle pumps/circles;LAQ  -MJ     Left Upper Extremity   AAROM:;PROM:;10 reps;elbow flexion/extension;hand pumps;pronation/supination;shoulder extension/flexion;shoulder ER/IR   wrist F/E  -CL    Recorded by  [MJ] Hamida De Paz, PT [CL] Martine Silverio OT    Positioning and Restraints    Pre-Treatment Position  sitting in chair/recliner  -MJ sitting in chair/recliner  -CL    Post Treatment Position  chair  - chair  -CL    In Chair  reclined;notified nsg;call light within reach;encouraged to call for assist;exit alarm on;legs elevated  -MJ notified nsg;reclined;call light within reach;encouraged to call for assist;exit alarm on;with PT  -CL    Recorded by  [MJ] Hamida De Paz, PT [CL] Martine Silverio, OT       12/11/17 0819          Rehab Assessment/Intervention    Discipline physical therapist  -MJ      Document Type therapy note (daily note)  -MJ      Subjective Information agree to therapy;complains of;pain   R shoulder  -MJ      Patient Effort, Rehab Treatment good  -MJ      Symptoms Noted During/After Treatment significant change in vital signs   elevated HR  -MJ      Precautions/Limitations fall precautions;other (see comments)   L neglect  -MJ      Recorded by [MJ] Hamida De Paz, PT      Vital Signs    Pre Systolic BP Rehab 117  -MJ      Pre Treatment Diastolic BP 54  -MJ      Post Systolic BP Rehab 140  -MJ      Post Treatment Diastolic BP 64  -MJ      Pretreatment Heart Rate (beats/min) 109  -MJ      Intratreatment Heart Rate (beats/min) 153  -MJ      Posttreatment Heart Rate (beats/min) 110  -MJ      Pre SpO2 (%) 97  -MJ      O2 Delivery Pre Treatment room air  -MJ      Post SpO2 (%) 97  -MJ      O2 Delivery Post Treatment room air  -MJ      Pre Patient Position Sitting  -MJ      Intra Patient Position Standing  -MJ      Post Patient Position Sitting  -MJ      Recorded by [MJ] Hamida De Paz, PT      Pain Assessment    Pain Assessment 0-10  -MJ      Pain Score 5  -MJ      Post Pain Score 5  -MJ      Pain Location Shoulder   Right  -MJ      Pain Intervention(s) Ambulation/increased activity;Repositioned  -MJ      Response to Interventions tolerated  -MJ      Recorded by [MJ] Hamida De Paz, PT      Vision Assessment/Intervention    Visual Impairment decreased tracking across midline;decreased visual tracking   decreased tracking to L  -MJ      Recorded by [MJ] Hamida De Paz, PT      Cognitive Assessment/Intervention    Current Cognitive/Communication Assessment impaired  -MJ      Orientation Status oriented to;person;place;required verbal cueing (specifiy in comments);disoriented to;time   VCs for name of hospital  -MJ      Follows Commands/Answers Questions able to follow single-step instructions;50% of  the time;needs cueing;needs increased time;needs repetition  -      Personal Safety moderate impairment;decreased awareness, need for assist;decreased awareness, need for safety  -      Personal Safety Interventions fall prevention program maintained;gait belt;muscle strengthening facilitated;nonskid shoes/slippers when out of bed  -MJ      Recorded by [MJ] Hamida De Paz, PT      Bed Mobility, Assessment/Treatment    Bed Mob, Supine to Sit, Hessel not tested  -      Bed Mobility, Comment UIC  -MJ      Recorded by [MJ] Hamida De Paz, PT      Transfer Assessment/Treatment    Transfers, Sit-Stand Hessel moderate assist (50% patient effort)   initially with posterior lean; verbal & tactile cues needed  -      Transfers, Stand-Sit Hessel moderate assist (50% patient effort)  -      Transfers, Sit-Stand-Sit, Assist Device rolling walker   gait belt  -      Transfer, Comment attempted side steps to L for pivot to BSC but unable to initiate side steps completing forward steps.  Commode brought behind patient  -MJ      Recorded by [MJ] Hamida De Paz, PT      Gait Assessment/Treatment    Gait, Hessel Level moderate assist (50% patient effort);1 person + 1 person to manage equipment   chair follow  -      Gait, Assistive Device rolling walker  -      Gait, Distance (Feet) 15  -MJ      Gait, Gait Pattern Analysis swing-to gait  -      Gait, Gait Deviations bhupinder decreased;double stance time increased;decreased heel strike  -      Gait, Safety Issues balance decreased during turns;sequencing ability decreased;step length decreased;weight-shifting ability decreased  -      Gait, Impairments strength decreased;impaired balance  -      Gait, Comment elevated HR limited distance  -      Recorded by [MJ] Hamida De Paz, PT      Balance Skills Training    Sitting-Level of Assistance Contact guard  -      Sitting-Balance Support Feet supported;Right upper extremity supported   -MJ      Standing-Level of Assistance Moderate assistance  -MJ      Static Standing Balance Support assistive device  -MJ      Standing-Balance Activities Weight Shift R-L;Weight Shift A-P  -MJ      Gait Balance-Level of Assistance Moderate assistance  -MJ      Gait Balance Support assistive device  -MJ      Recorded by [MJ] Hamida De Paz, PT      Therapy Exercises    Bilateral Lower Extremities sitting;AROM:;10 reps;ankle pumps/circles;LAQ  -MJ      Right Upper Extremity sitting;AROM:;10 reps;elbow flexion/extension;shoulder extension/flexion  -MJ      Left Upper Extremity sitting;AAROM:;elbow flexion/extension;shoulder extension/flexion  -MJ      Recorded by [MJ] Hamida De Paz, PT      Positioning and Restraints    Pre-Treatment Position sitting in chair/recliner  -MJ      Post Treatment Position chair  -MJ      In Chair notified nsg;sitting;call light within reach;encouraged to call for assist;exit alarm on;LUE elevated;legs elevated;heels elevated  -MJ      Recorded by [MJ] Hamida De Paz, PT        User Key  (r) = Recorded By, (t) = Taken By, (c) = Cosigned By    Initials Name Effective Dates     Violet Hidalgo, MS CCC-SLP 06/22/15 -     CL Martine Silverio, OT 06/08/16 -     MJ Hamida De Paz, PT 10/30/17 -               IP SLP Goals       12/12/17 1644 12/11/17 1008       Safely Consume Diet    Safely Consume Diet- SLP, Date Established 12/12/17  -SM      Safely Consume Diet- SLP, Time to Achieve by discharge  -SM      Safely Consume Diet- SLP, Outcome goal ongoing  -SM      Begin to Take Some PO Safely    Begin to Take Some PO Safely- SLP, Date Established  12/11/17  -LS     Begin to Take Some PO Safely- SLP, Time to Achieve  by discharge  -LS     Begin to Take Some PO Safely- SLP, Date Goal Reviewed  12/11/17  -LS     Begin to Take Some PO Safely- SLP, Outcome goal met  -SM goal ongoing  -LS     Cognitive Linguistic- Optimal Participation in Care    Cognitive Linguistic Optimal Participation in  Care- SLP, Date Established 12/11/17  -      Cognitive Linguistic Optimal Participation in Care- SLP, Time to Achieve by discharge  -      Cognitive Linguistic Optimal Participation in Care- SLP, Outcome goal ongoing  -        User Key  (r) = Recorded By, (t) = Taken By, (c) = Cosigned By    Initials Name Provider Type     Violet Hidalgo, MS CCC-SLP Speech and Language Pathologist    NASRIN Conklin, MS CCC-SLP Speech and Language Pathologist          EDUCATION  The patient has been educated in the following areas:   Cognitive Impairment Communication Impairment.    SLP Recommendation and Plan                               Plan of Care Review  Plan Of Care Reviewed With: patient  Outcome Summary/Follow up Plan: Dysphagia: Showing significant improvement at the bedside compared to yesetrday. Keofeed not working. FEES: Significant improvement compared to yesterday. Inconsistent aspiration with thin liquids during the swallow 2' mistimed swallow sequence. Use of small single sip helped though due to fatigue factor, too high aspiration risk. Inconsistent sensation with aspiration. No penetration or aspiration with nectar-thick liquids. Mild diffuse residue with all consistencies. DNT solids per pt preference. Recommend: Dysphagia Level 3 puree with some soft, nectar-thick liquids, small bites/sips, ok with straws (place on R side of mouth). Pills in applesauce, crush prn. Check mouth for pocketing. Will continue to follow for tx.  Participated well in cog-comm tx. Still some L neglect though able to complete word search puzzles. Now that pt has returned to PO diet, will further assess higher-level cog-comm skills as was independendtly manageing household prior to admission (though do note dtr plans for pt to live with her after this). Will follow.           Time Calculation:         Time Calculation- SLP       12/12/17 8630          Time Calculation- SLP    SLP Start Time 1100  -      SLP Received On  17  -        User Key  (r) = Recorded By, (t) = Taken By, (c) = Cosigned By    Initials Name Provider Type     Violet Hidalgo MS CCC-SLP Speech and Language Pathologist          Therapy Charges for Today     Code Description Service Date Service Provider Modifiers Qty    58540426406 HC ST EVAL ORAL PHARYNG SWALLOW 2 2017 Violet Hidalgo MS CCC-SLP GN 1    [id number] HC ST FIBEROPTIC ENDO EVAL SWALL 4 2017 Violet Hidalgo MS CCC-SLP GN 1    [id number] HC ST TREATMENT SPEECH 1 2017 Violet Hidalgo, MS KENDRICK-SLP GN 1               Violet Hidalgo MS CCC-SLP  2017 and Acute Care - Speech Language Pathology   Swallow Re-Evaluation [facility]     Patient Name: Lois Brennan  : 1940  MRN: [id number]  Today's Date: 2017  Onset of Illness/Injury or Date of Surgery Date: 17            Admit Date: 2017    Visit Dx:     ICD-10-CM ICD-9-CM   1. Intracranial hematoma, right, without loss of consciousness, initial encounter S06.340A 853.01   2. Impaired mobility and ADLs Z74.09 799.89   3. Impaired functional mobility, balance, gait, and endurance Z74.09 V49.89     Patient Active Problem List   Diagnosis   • Intracranial hemorrhage   • Essential hypertension   • Type 2 diabetes mellitus with complication, without long-term current use of insulin   • Pharyngeal dysphagia   • GERD (gastroesophageal reflux disease)   • Dementia   • Rheumatoid arthritis   • Paroxysmal atrial fibrillation     Past Medical History:   Diagnosis Date   • Dementia    • Diabetes mellitus    • GERD (gastroesophageal reflux disease)    • Hypertension    • Rheumatoid arthritis    • TIA (transient ischemic attack)      Past Surgical History:   Procedure Laterality Date   • CATARACT EXTRACTION, BILATERAL     • EYE SURGERY            SWALLOW EVALUATION (last 72 hours)      Swallow Evaluation       17 1100 17 0930 12/10/17 1040          Rehab Evaluation    Document  Type   evaluation  -LS      Subjective Information agree to therapy  -  no complaints;agree to therapy  -      General Information    Patient Profile Review   yes  -LS      Subjective Patient Observations alert and cooperative  -  alert and oriented, cooperative  -      Pertinent History Of Current Problem RN reports improvement in oral strength and speech - Keofeed issues, ? PO readiness  -SM  admit w/ [facility] in [place] [facility]atal[place], mild dementia at baseline per RN  -LS      Current Diet Limitations NPO  -SM  NPO  -LS      Prior Level of Function- Communication   other (comment)   mild dementia  -LS      Prior Level of Function- Swallowing   no diet consistency restrictions  -      Plans/Goals Discussed With patient;agreed upon  -  patient  -LS      Barriers to Rehab none identified  -SM  medically complex  -LS      Clinical Impression    Patient's Goals For Discharge return to PO diet  - patient did not state  -       Criteria for Skilled Therapeutic Interventions Met  skilled criteria for dysphagia intervention met  -LS skilled criteria for dysphagia intervention met  -      FCM, Swallowing  1-->Level 1  -LS 1-->Level 1  -LS      Therapy Frequency  5 times/wk  - PRN  -LS      SLP Diet Recommendation  NPO: unsafe for food/liquid intake  - NPO: unsafe for food/liquid intake  -      Recommended Diagnostics  FEES   completed 12/11/17  - FEES  -      SLP Rec. for Method of Medication Administration  meds via alternate route  - meds via alternate route  -      Pain Assessment    Pain Assessment No/denies pain  -  No/denies pain  -      Cognitive Assessment/Intervention    Current Cognitive/Communication Assessment impaired  -        Orientation Status oriented to;person;time;situation;required verbal cueing (specifiy in comments);place   delayed response for time  -SM        Follows Commands/Answers Questions 100% of the time;able to follow single-step instructions;needs  cueing;needs increased time;needs repetition  -SM        Oral Motor Structure and Function    Oral Musculature General Assessment   oral labial impairment  -LS      Oral Motor Assessment Comment   L facial and labial droop  -LS      Clinical Swallow Exam    Mode of Presentation   fed by clinician;cup;spoon  -LS      Oral Phase Results   impaired oral phase, signs of dysfunction present  -LS      Pharyngeal Phase Results   sign/symptoms of pharyngeal impairment  -LS      Summary of Clinical Exam Dysphagia Re-eval as ? PO readiness given significant imrpovement noted over past 24 hours. Pt is showing less s/s aspiration - no coughing with tsp amounts of thin liquids (though seems very mistimed). No multiple swallows as seen yesterday. Will repeat FEES for ? PO readiness  -SM  R/O pharyngeal dysphagia. Oral phase deficits c/b L facial/labial droop  and oral residue on the L w/ pureed. S/S of asp included overt coughing w/ tsp of thins, multiple swallows with pureed and nectar thick. SLP unable to rec safe diet at the bedside. REC: NPO w/ meds via alt route, FEES to be completed 12/11.  -LS      Fiberoptic Endoscopic Swallowing Exam    Risks/Benefits Reviewed risks/benefits explained;agreed to eval;patient  -SM risks/benefits explained;agreed to eval;patient  -LS       Positioning Needs for Swallow Exam  upright in chair  -LS       Anatomy and Physiology    Velopharyngeal WFL  -SM WFL  -LS       Base of Tongue  symmetrical  -LS       Epiglottis WFL  -SM WFL  -LS       Laryngeal Function Breathing asymmetrical  -SM asymmetrical;decreased movement left  -LS       Laryngeal Function Phonation  symmetrical;decreased movement left  -LS       Laryngeal Function Breath Hold  incomplete closure  -LS       Secretions 0- Normal, no visible secretions  -SM 1- Secretions present around the laryngeal vestibule  -LS       Secretion Description thin;clear  -SM thin;white  -LS       Ice Chips DNA  -SM        Spontaneous Swallow  frequency adequate  -SM frequency reduced  -LS       Sensory senses scope  -SM reduced sensation  -LS       Oral-Phary Transit increased prep time;liquids leaking  -SM increased transit time;increased prep time;inadequate mastication  -LS       Anatomy Hypopharynx    Observation Anatomic Considerations  no anatomic structural deviation via FEES  -LS       FEES    Pharyngeal Phase Impairment thin:;impaired timing with aspiration during  -SM thin:;nectar:;honey:;pudding:;cohesive solid:;impaired timing with aspiration during;aspiration after from residue  -LS       Post Swallow Residue other (see comments)   min residual coating - diffuse  -SM pudding:;mixed:;thin:;residue present in valleculae;residue present pyriform sinuses   lateral channel  -LS       Rosenbek's Scale thin:;8-->Level 8  -SM        Response to Aspiration other (see comments)   inconsistent cough response  -SM absent response, silent aspiration;unproductive reflexive cough  -LS       Attempted Compensatory Maneuvers bolus size   inconsistently prevented, fatigue factor  -SM bolus size;bolus presentation style  -LS       Pharyngeal Phase Results impaired pharyngeal phase of swallowing  -SM impaired pharyngeal phase of swallowing  -LS       FEES Summary FEES: Significant improvement compared to yesterday. Inconsistent aspiration with thin liquids during the swallow 2' mistimed swallow sequence. Use of small single sip helped though due to fatigue factor, too high aspiration risk. Inconsistent sensation with aspiration. No penetration or aspiration with nectar-thick liquids. Mild diffuse residue with all consistencies. DNT solids per pt preference. Recommend: Dysphagia Level 3 puree with some soft, nectar-thick liquids, small bites/sips, ok with straws (place on R side of mouth). Pills in applesauce, crush prn. Check mouth for pocketing. Will continue to follow for tx.   -SM Severe oropharyengeal dysphagia. Incompelted mastication of  a solids.  Increased A-P transit time w/ all trials. Asp before the swallow w/ thins via tsp 2' delayed initiation to the lateral channel and decreased airway closure.  Initially pt safe w/ pudding and nectar but quickly fatigued. Asp during the swallow w/ nectar thick liquids and pudding 2' timing and decreased airway closure. Asp after the swallow 2' residue spilling from the lateral channels and posterior portion of the epiglottis. Pt at high risk for asp of honey via tsp 2' moderate lateral channel residue. On initial swallow of solid, residue of inadequetly masticated solid remained in valleculea. It was swallowed w/ a liquids wash on nectar thick liquids which resulted in asp of nectar thick liquids but cleared solid residue. SLP unable to recommend safe Po diet at this time. REC: NPO w/ meds via alt route. SLP to follow for dysphagia tx.    -LS       Dysphagia Treatment Objectives and Progress    Dysphagia Treatment Objectives Other 1;Other 2  -SM Improve laryngeal closure;Improve oral skills;Improve timing of pharyngeal response;Improve laryngeal elevation;Improve hyolaryngeal excursion;Improve tongue base & pharyngeal wall squeeze  -LS       Improve oral skills    To Improve Oral Skills, patient will:  Increase tongue A-P movement;100%;with inconsistent cues   Mastication of solid  -LS       Status: Improve Oral Skills  New  -LS       Improve timing of pharyngeal response    To improve timing of pharyngeal response, patient will:  Swallow in timely way using suck-swallow response;Swallow in timely way using three second prep;Swallow in timely way using super-supraglottic swallow;80%;with consistent cues  -LS       Status: Improve timing of pharyngeal response  New  -LS       Improve laryngeal closure    To improve laryngeal closure, patient will:  Complete supraglottic swallow;Complete Valsalva/breath hold;80%;with consistent cues  -LS       Status: Improve laryngeal closure  New  -LS       Improve laryngeal elevation     To improve laryngeal elevation, patient will:  Complete super-supraglottic swallow;80%;with consistent cues  -LS       Improve tongue base & pharyngeal wall squeeze    To improve tongue base & pharyngeal wall squeeze, patient will:  Complete effortful swallow;80%;with consistent cues  -LS       Dysphagia Other 1    Dysphagia Other 1 Objective Tolerate H2O without s/s aspiration with 100% accuracy and no cues  -SM        Status: Dysphagia Other 1 New  -SM        Dysphagia Other 1 Progress continue to address  -SM        Dysphagia Other 2    Dysphagia Other 2 Objective Tolerate trials of soft solids without signs of difficulty with 100% accuracy and no cues  -SM        Status: Dysphagia Other 2 New  -SM        Dysphagia Other 2 Progress continue to address  -SM          User Key  (r) = Recorded By, (t) = Taken By, (c) = Cosigned By    Initials Name Effective Dates    BHAVYA Hidalgo, MS CCC-SLP 06/22/15 -     LS Sharri Conklin, MS CCC-SLP 06/22/15 -         EDUCATION  The patient has been educated in the following areas:   Dysphagia (Swallowing Impairment) Modified Diet Instruction.    SLP Recommendation and Plan                                           Plan of Care Review  Plan Of Care Reviewed With: patient  Outcome Summary/Follow up Plan: Dysphagia: Showing significant improvement at the bedside compared to yesetrday. Keofeed not working. FEES: Significant improvement compared to yesterday. Inconsistent aspiration with thin liquids during the swallow 2' mistimed swallow sequence. Use of small single sip helped though due to fatigue factor, too high aspiration risk. Inconsistent sensation with aspiration. No penetration or aspiration with nectar-thick liquids. Mild diffuse residue with all consistencies. DNT solids per pt preference. Recommend: Dysphagia Level 3 puree with some soft, nectar-thick liquids, small bites/sips, ok with straws (place on R side of mouth). Pills in applesauce, crush prn. Check  mouth for pocketing. Will continue to follow for tx.  Participated well in cog-comm tx. Still some L neglect though able to complete word search puzzles. Now that pt has returned to PO diet, will further assess higher-level cog-comm skills as was independendtly manageing household prior to admission (though do note dtr plans for pt to live with her after this). Will follow.           IP SLP Goals       12/12/17 1644 12/11/17 1008       Safely Consume Diet    Safely Consume Diet- SLP, Date Established 12/12/17  -SM      Safely Consume Diet- SLP, Time to Achieve by discharge  -SM      Safely Consume Diet- SLP, Outcome goal ongoing  -SM      Begin to Take Some PO Safely    Begin to Take Some PO Safely- SLP, Date Established  12/11/17  -LS     Begin to Take Some PO Safely- SLP, Time to Achieve  by discharge  -LS     Begin to Take Some PO Safely- SLP, Date Goal Reviewed  12/11/17  -LS     Begin to Take Some PO Safely- SLP, Outcome goal met  -SM goal ongoing  -LS     Cognitive Linguistic- Optimal Participation in Care    Cognitive Linguistic Optimal Participation in Care- SLP, Date Established 12/11/17  -SM      Cognitive Linguistic Optimal Participation in Care- SLP, Time to Achieve by discharge  -SM      Cognitive Linguistic Optimal Participation in Care- SLP, Outcome goal ongoing  -SM        User Key  (r) = Recorded By, (t) = Taken By, (c) = Cosigned By    Initials Name Provider Type    BHAVYA Hidalgo, MS CCC-SLP Speech and Language Pathologist     Sharri Cnoklin, MS CCC-SLP Speech and Language Pathologist               Time Calculation:         Time Calculation- SLP       12/12/17 1647          Time Calculation- SLP    SLP Start Time 1100  -SM      SLP Received On 12/12/17  -        User Key  (r) = Recorded By, (t) = Taken By, (c) = Cosigned By    Initials Name Provider Type    BHAVYA Hidalgo, MS CCC-SLP Speech and Language Pathologist          Therapy Charges for Today     Code Description  Service Date Service Provider Modifiers Qty    78913965599 HC ST EVAL ORAL PHARYNG SWALLOW 2 12/12/2017 Violet Hidalgo, MS CCC-SLP GN 1    25624099801 HC ST FIBEROPTIC ENDO EVAL SWALL 4 12/12/2017 Violet Hidalgo, MS CCC-SLP GN 1    96599843161 HC ST TREATMENT SPEECH 1 12/12/2017 Violet Hidalgo, MS CCC-SLP GN 1               Violet Hidalgo, MS CCC-SLP  12/12/2017

## 2017-12-12 NOTE — PROGRESS NOTES
Continued Stay Note   Deborah     Patient Name: Lois Brennan  MRN: [id number]  Today's Date: 12/12/2017    Admit Date: 12/9/2017          Discharge Plan       12/12/17 0929    Case Management/Social Work Plan    Additional Comments Referral made to [facility] to evaluate for In Pt. rehab.      12/12/17 0886    Case Management/Social Work Plan    Additional Comments Spoke with Ms. Brennan's daughter, Keely on phone.  Her mother was living alone in 1 level home in [place] [place] [facility].  Her daughter lives 15 min. away.  She has never used DME, HH or Home O2.  She uses [facility] in [place] [place] for her Rx.  Her insurance covers cost of her meds. Her daughter states she plans to take her to her home when discharged.  She does not want her to live alone now.  CM will follow for upcoming d/c needs.  May need rehab.  Will discuss needs with multi-disciplinary team.                Discharge Codes     None            Nnamdi Hatch RN

## 2017-12-13 DIAGNOSIS — IMO0001: Primary | ICD-10-CM

## 2017-12-13 LAB
ANION GAP SERPL CALCULATED.3IONS-SCNC: 5 MMOL/L (ref 3–11)
BUN BLD-MCNC: 28 MG/DL (ref 9–23)
BUN/CREAT SERPL: 23.3 (ref 7–25)
CALCIUM SPEC-SCNC: 8.7 MG/DL (ref 8.7–10.4)
CHLORIDE SERPL-SCNC: 104 MMOL/L (ref 99–109)
CO2 SERPL-SCNC: 27 MMOL/L (ref 20–31)
CREAT BLD-MCNC: 1.2 MG/DL (ref 0.6–1.3)
DEPRECATED RDW RBC AUTO: 46.4 FL (ref 37–54)
ERYTHROCYTE [DISTWIDTH] IN BLOOD BY AUTOMATED COUNT: 14.3 % (ref 11.3–14.5)
GFR SERPL CREATININE-BSD FRML MDRD: 44 ML/MIN/1.73
GLUCOSE BLD-MCNC: 180 MG/DL (ref 70–100)
GLUCOSE BLDC GLUCOMTR-MCNC: 143 MG/DL (ref 70–130)
GLUCOSE BLDC GLUCOMTR-MCNC: 167 MG/DL (ref 70–130)
GLUCOSE BLDC GLUCOMTR-MCNC: 202 MG/DL (ref 70–130)
GLUCOSE BLDC GLUCOMTR-MCNC: 213 MG/DL (ref 70–130)
GLUCOSE BLDC GLUCOMTR-MCNC: 286 MG/DL (ref 70–130)
HCT VFR BLD AUTO: 38.1 % (ref 34.5–44)
HGB BLD-MCNC: 12.2 G/DL (ref 11.5–15.5)
MAGNESIUM SERPL-MCNC: 2.2 MG/DL (ref 1.3–2.7)
MCH RBC QN AUTO: 28.2 PG (ref 27–31)
MCHC RBC AUTO-ENTMCNC: 32 G/DL (ref 32–36)
MCV RBC AUTO: 88 FL (ref 80–99)
PHOSPHATE SERPL-MCNC: 4.4 MG/DL (ref 2.4–5.1)
PLATELET # BLD AUTO: 217 10*3/MM3 (ref 150–450)
PMV BLD AUTO: 10.5 FL (ref 6–12)
POTASSIUM BLD-SCNC: 4.3 MMOL/L (ref 3.5–5.5)
RBC # BLD AUTO: 4.33 10*6/MM3 (ref 3.89–5.14)
SODIUM BLD-SCNC: 136 MMOL/L (ref 132–146)
WBC NRBC COR # BLD: 7.62 10*3/MM3 (ref 3.5–10.8)

## 2017-12-13 PROCEDURE — 97116 GAIT TRAINING THERAPY: CPT | Performed by: PHYSICAL THERAPIST

## 2017-12-13 PROCEDURE — 99222 1ST HOSP IP/OBS MODERATE 55: CPT | Performed by: INTERNAL MEDICINE

## 2017-12-13 PROCEDURE — 25010000002 AMIODARONE IN DEXTROSE 5% 360-4.14 MG/200ML-% SOLUTION: Performed by: INTERNAL MEDICINE

## 2017-12-13 PROCEDURE — 84100 ASSAY OF PHOSPHORUS: CPT | Performed by: NURSE PRACTITIONER

## 2017-12-13 PROCEDURE — 82962 GLUCOSE BLOOD TEST: CPT

## 2017-12-13 PROCEDURE — 97110 THERAPEUTIC EXERCISES: CPT | Performed by: PHYSICAL THERAPIST

## 2017-12-13 PROCEDURE — 80048 BASIC METABOLIC PNL TOTAL CA: CPT | Performed by: NURSE PRACTITIONER

## 2017-12-13 PROCEDURE — 63710000001 PREDNISONE PER 5 MG: Performed by: INTERNAL MEDICINE

## 2017-12-13 PROCEDURE — 85027 COMPLETE CBC AUTOMATED: CPT | Performed by: INTERNAL MEDICINE

## 2017-12-13 PROCEDURE — 83735 ASSAY OF MAGNESIUM: CPT | Performed by: NURSE PRACTITIONER

## 2017-12-13 PROCEDURE — 99233 SBSQ HOSP IP/OBS HIGH 50: CPT | Performed by: INTERNAL MEDICINE

## 2017-12-13 RX ORDER — HYDRALAZINE HYDROCHLORIDE 25 MG/1
25 TABLET, FILM COATED ORAL EVERY 8 HOURS SCHEDULED
Status: DISCONTINUED | OUTPATIENT
Start: 2017-12-13 | End: 2017-12-15 | Stop reason: HOSPADM

## 2017-12-13 RX ORDER — HYDRALAZINE HYDROCHLORIDE 10 MG/1
10 TABLET, FILM COATED ORAL EVERY 8 HOURS SCHEDULED
Status: DISCONTINUED | OUTPATIENT
Start: 2017-12-13 | End: 2017-12-13

## 2017-12-13 RX ORDER — AMIODARONE HYDROCHLORIDE 200 MG/1
400 TABLET ORAL EVERY 12 HOURS SCHEDULED
Status: DISCONTINUED | OUTPATIENT
Start: 2017-12-13 | End: 2017-12-15 | Stop reason: HOSPADM

## 2017-12-13 RX ORDER — HYDRALAZINE HYDROCHLORIDE 10 MG/1
10 TABLET, FILM COATED ORAL ONCE
Status: COMPLETED | OUTPATIENT
Start: 2017-12-13 | End: 2017-12-13

## 2017-12-13 RX ADMIN — PREDNISONE 7.5 MG: 5 TABLET ORAL at 08:54

## 2017-12-13 RX ADMIN — FAMOTIDINE 20 MG: 20 TABLET, FILM COATED ORAL at 08:54

## 2017-12-13 RX ADMIN — ACETAMINOPHEN 650 MG: 325 TABLET ORAL at 21:42

## 2017-12-13 RX ADMIN — AMIODARONE HYDROCHLORIDE 1 MG/MIN: 1.8 INJECTION, SOLUTION INTRAVENOUS at 10:46

## 2017-12-13 RX ADMIN — FAMOTIDINE 20 MG: 20 TABLET, FILM COATED ORAL at 21:42

## 2017-12-13 RX ADMIN — HYDRALAZINE HYDROCHLORIDE 25 MG: 25 TABLET ORAL at 21:42

## 2017-12-13 RX ADMIN — HYDRALAZINE HYDROCHLORIDE 10 MG: 10 TABLET ORAL at 12:08

## 2017-12-13 RX ADMIN — HYDRALAZINE HYDROCHLORIDE 10 MG: 10 TABLET ORAL at 17:14

## 2017-12-13 RX ADMIN — METOPROLOL TARTRATE 12.5 MG: 25 TABLET, FILM COATED ORAL at 21:42

## 2017-12-13 RX ADMIN — AMIODARONE HYDROCHLORIDE 400 MG: 200 TABLET ORAL at 21:42

## 2017-12-13 RX ADMIN — LABETALOL HYDROCHLORIDE 20 MG: 5 INJECTION, SOLUTION INTRAVENOUS at 12:07

## 2017-12-13 RX ADMIN — METOPROLOL TARTRATE 12.5 MG: 25 TABLET, FILM COATED ORAL at 11:56

## 2017-12-13 RX ADMIN — AMIODARONE HYDROCHLORIDE 400 MG: 200 TABLET ORAL at 11:56

## 2017-12-13 NOTE — PROGRESS NOTES
Continued Stay Note  [facility]     Patient Name: oLis Brennan  MRN: [id number]  Today's Date: 12/13/2017    Admit Date: 12/9/2017          Discharge Plan       12/13/17 0743    Case Management/Social Work Plan    Plan [facility]    Patient/Family In Agreement With Plan yes    Additional Comments Spoke ben Snider RN c [facility].  They have a bed for Ms. Brennan when she is ready to transfer there.                Discharge Codes     None            Nnamdi Hatch RN

## 2017-12-13 NOTE — PROGRESS NOTES
Pulmonary/Critical Care Follow-up     LOS: 4 days   Patient Care Team:  No Known Provider as PCP - General        Chief Complaint   Patient presents with   • Stroke     Subjective    77-year-old woman with a history of diabetes, RA on chronic low dose prednisone, HTN who presented with left sided weakness, right frontal lobe hemorrhage with edema. She had a preceding episode of transient facial weakness earlier this week.  CT scan revealed a 3.7 x 3.8 cm frontal hemorrhage with some right to left shift and a smaller posterior and superior hemorrhage.    Patient had atrial fibrillation during her echocardiogram.      Interval History:   Patient continues to make neurologic improvement.  She is tolerating a diet and feeding herself.  Her heart rate varied significantly overnight from .  Cardiology has seen the patient.  Patient has been accepted at [facility].  Blood pressure continues to be elevated.  No headache, nausea, or vomiting.    History taken from: Chart/patient    PMH/FH/Social History were reviewed and updated appropriately in the electronic medical record.     Review of Systems:    Review of 14 systems was completed with positives and pertinent negatives noted in the subjective section.  All other systems reviewed and are negative.   Exceptions are noted below:    Unable to obtain secondary to confusion      Objective     Vital Signs  Temp:  [97.1 °F (36.2 °C)-98.3 °F (36.8 °C)] 97.7 °F (36.5 °C)  Heart Rate:  [] 67  Resp:  [16-20] 18  BP: (105-151)/(53-99) 141/79  12/12 0701 - 12/13 0700  In: -   Out: 1000 [Urine:1000]  Body mass index is 27.34 kg/(m^2).     Physical Exam:     Constitutional:    Alert, cooperative, in no acute distress   Head:    Normocephalic, left facial droop    Eyes:            Lids and lashes normal, conjunctivae and sclerae normal, no   icterus, no pallor, corneas clear, PER   ENMT:   Ears appear intact with no abnormalities noted      No oral  lesions, no thrush, oral mucosa moist   Neck:   No adenopathy, supple, trachea midline, no thyromegaly, no JVD       Lungs/Resp:     Normal effort, symmetric chest rise, no crepitus, clear to      auscultation bilaterally, no chest wall tenderness                Heart/CV:    Tachycardic, irregularly irregular, normal S1 and S2, no            murmur   Abdomen/GI:     Normal bowel sounds, no masses, no organomegaly, soft,        non-tender, non-distended   :     Deferred   Extremities/MSK:   No clubbing or cyanosis.  No edema.  Normal tone.  No deformities.    Pulses:   Pulses palpable and equal bilaterally   Skin:   No bleeding, bruising or rash   Heme/Lymph:   No cervical or supraclavicular adenopathy.    Neurologic:    Psychiatric:       Left side weakness 2/5 LUE  4/5 LLE, left facial droop.      Anxious.             Results Review:     I reviewed the patient's new clinical results.     Results from last 7 days  Lab Units 12/13/17 0450 12/11/17 0405 12/09/17  1539   SODIUM mmol/L 136 138 135   POTASSIUM mmol/L 4.3 4.0 3.7   CHLORIDE mmol/L 104 108 102   CO2 mmol/L 27.0 22.0 27.0   BUN mg/dL 28* 21 11   CREATININE mg/dL 1.20 1.00 1.00   CALCIUM mg/dL 8.7 8.4* 8.9   BILIRUBIN mg/dL  --   --  0.6   ALK PHOS U/L  --   --  75   ALT (SGPT) U/L  --   --  7   AST (SGOT) U/L  --   --  12   GLUCOSE mg/dL 180* 172* 166*       Results from last 7 days  Lab Units 12/13/17 0450 12/12/17  0434 12/11/17  0405   WBC 10*3/mm3 7.62 8.66 10.60   HEMOGLOBIN g/dL 12.2 10.9* 11.2*   HEMATOCRIT % 38.1 33.3* 34.8   PLATELETS 10*3/mm3 217 201 209           Results from last 7 days  Lab Units 12/13/17  0450 12/11/17  0405   MAGNESIUM mg/dL 2.2 2.0   PHOSPHORUS mg/dL 4.4 3.4       Results from last 7 days  Lab Units 12/10/17  0542   HEMOGLOBIN A1C % 7.00*       I reviewed the patient's new imaging including images and reports.    CT head 12/10/17:  IMPRESSION:  No interval change is noted in the intra-axial and  extracerebral bleed  complex described above. There is no evidence of  progressive hemorrhage, progressive edema or progressive mass effect.    CT head 12/9/17:  IMPRESSION:      Intra-axial cerebral bleed, right frontal parietal, with surrounding  edema and mass effect. Measurements and volumetric measurements are  offered above.      There is a 14 mm high attenuation structure adjacent to the posterior  right falx cerebri which could be a densely calcified meningioma or  second hemorrhage.      There is a small area of subarachnoid bleed along the right parietal  convexity in the sulcal GYRAL recesses.      There is considerable mass effect in the right hemisphere.    Echocardiogram 12/9/17:  · Left ventricular systolic function is normal. Estimated EF = 70%.  · Normal right ventricular cavity size, wall thickness, systolic function and septal motion noted.  · Saline test results are negative.  · The aortic valve exhibits sclerosis.  · No evidence of pulmonary hypertension is present.  · There is no evidence of pericardial effusion.  · Atrial fibrillation noted as primary rhythm.      Medication Review:     diltiaZEM  mg Oral Q24H   famotidine 20 mg Oral Q12H   insulin regular 0-9 Units Subcutaneous 4x Daily AC & at Bedtime   predniSONE 7.5 mg Oral Daily          Assessment/Plan     Principal Problem:    Intracranial hemorrhage  Active Problems:    Essential hypertension    Type 2 diabetes mellitus with complication, without long-term current use of insulin    Pharyngeal dysphagia    GERD (gastroesophageal reflux disease)    Dementia    Rheumatoid arthritis    Paroxysmal atrial fibrillation      Plan:  1.  Continue by mouth diet per speech recommendations.  2.  Okay to telemetry/hospitalists.  3.  Amiodarone per cardiology.  4.  Hydralazine started on morning rounds secondary to elevated blood pressures.  5.  Physical therapy.  6.  Occupational therapy  7.  Speech therapy.  8.  Transfer to [facility]  tomorrow.  9.  Continue home prednisone 7.5 mg daily.     Aquiles Silva MD  12/13/17  8:58 AM      *. Please note that portions of this note were completed with a voice recognition program. Efforts were made to edit the dictations, but occasionally words are mistranscribed.

## 2017-12-13 NOTE — CONSULTS
[facility] at [facility]   Consult Note    Referring Provider: Dr. Silva    Reason for Consultation: atrial fibrillation    Patient Care Team:  No Known Provider as PCP - General     Problem List:  1. Reported atrial fibrillation  1. Reported atrial fibrillation with RVR during anton feed insertion.  2. Reported atrial fibrillation noted during echo   3. No EKGs with documentation of atrial fibrillation.  2. CVA  1. Admission 12/9/17, CT of the head with intra-axial cerebral bleed, right frontal parietal, with surrounding edema and mass effect.  14 mm high attenuation structure adjacent to the posterior right falx cerebri which could be a densely calcified meningioma or second hemorrhage.  Small area of subarachnoid bleed along the right parietal convexity in the salt cold gyral recesses.  Considerable mass effect in the right hemisphere.  2. Per neurosurgery bleed thought possibly secondary to ischemic injury.  3. History of TIA  4. Hypertension  5. Diabetes mellitus  6. Dyslipidemia   7. GERD  8. Rheumatoid arthritis  9. Surgeries:  1. Bilateral cataract surgery  2. Eye surgery      Allergies   Allergen Reactions   • Ace Inhibitors    • Penicillins      unknown           Current Facility-Administered Medications:   •  acetaminophen (TYLENOL) tablet 650 mg, 650 mg, Oral, Q4H PRN **OR** acetaminophen (TYLENOL) suppository 650 mg, 650 mg, Rectal, Q4H PRN, Aury Espitia MD  •  dextrose (D50W) solution 25 g, 25 g, Intravenous, Q15 Min PRN, Aury Espitia MD  •  dextrose (GLUTOSE) oral gel 15 g, 15 g, Oral, Q15 Min PRN, Aury Espitia MD  •  diltiaZEM CD (CARDIZEM CD) 24 hr capsule 180 mg, 180 mg, Oral, Q24H, Aquiles Silva MD, 180 mg at 12/12/17 1754  •  famotidine (PEPCID) tablet 20 mg, 20 mg, Oral, Q12H, Aury Espitia MD, 20 mg at 12/13/17 0854  •  glucagon (human recombinant) (GLUCAGEN DIAGNOSTIC) injection 1 mg, 1 mg, Subcutaneous, Q15 Min PRN, Aury Espitia  MD  •  insulin regular (humuLIN R,novoLIN R) injection 0-9 Units, 0-9 Units, Subcutaneous, 4x Daily AC & at Bedtime, Aury Espitia MD  •  labetalol (NORMODYNE,TRANDATE) injection 20 mg, 20 mg, Intravenous, Q4H PRN, Aquiles Silva MD  •  Magnesium Sulfate 2 gram Bolus, followed by 8 gram infusion (total Mg dose 10 grams)- Mg less than or equal to 1mg/dL, 2 g, Intravenous, PRN **OR** Magnesium Sulfate 6 gram Infusion (2 gm x 3) -Mg 1.1 -1.5 mg/dL, 2 g, Intravenous, PRN **OR** magnesium sulfate 4 gram infusion- Mg 1.6-1.9 mg/dL, 4 g, Intravenous, PRN, Aury Espitia MD  •  ondansetron (ZOFRAN) injection 4 mg, 4 mg, Intravenous, Q6H PRN, Aury Espitia MD  •  potassium chloride (MICRO-K) CR capsule 40 mEq, 40 mEq, Oral, PRN **OR** potassium chloride (KLOR-CON) packet 40 mEq, 40 mEq, Oral, PRN **OR** potassium chloride 10 mEq in 100 mL IVPB, 10 mEq, Intravenous, Q1H PRN, Aury Espitia MD  •  potassium phosphate 45 mmol in sodium chloride 0.9 % 500 mL infusion, 45 mmol, Intravenous, PRN **OR** potassium phosphate 30 mmol in sodium chloride 0.9 % 250 mL infusion, 30 mmol, Intravenous, PRN **OR** potassium phosphate 15 mmol in sodium chloride 0.9 % 100 mL infusion, 15 mmol, Intravenous, PRN **OR** sodium phosphates 45 mmol in sodium chloride 0.9 % 500 mL IVPB, 45 mmol, Intravenous, PRN **OR** sodium phosphates 30 mmol in sodium chloride 0.9 % 250 mL IVPB, 30 mmol, Intravenous, PRN **OR** sodium phosphates 15 mmol in sodium chloride 0.9 % 250 mL IVPB, 15 mmol, Intravenous, PRN, Aury Espitia MD  •  predniSONE (DELTASONE) tablet 7.5 mg, 7.5 mg, Oral, Daily, Aquiles Silva MD, 7.5 mg at 12/13/17 0854  •  sodium chloride 0.9 % flush 1-10 mL, 1-10 mL, Intravenous, PRN, Aury Espitia MD         Prescriptions Prior to Admission   Medication Sig Dispense Refill Last Dose   • amLODIPine (NORVASC) 2.5 MG tablet Take 2.5 mg by mouth Daily.      • aspirin 81 MG EC tablet Take 81 mg by  "mouth Daily.      • donepezil (ARICEPT) 10 MG tablet Take 10 mg by mouth Every Night.      • metFORMIN ER (GLUCOPHAGE-XR) 500 MG 24 hr tablet Take 500 mg by mouth Daily With Breakfast.      • metFORMIN ER (GLUCOPHAGE-XR) 500 MG 24 hr tablet Take 1,000 mg by mouth Daily With Dinner.      • nitroglycerin (NITROSTAT) 0.4 MG SL tablet Place 0.4 mg under the tongue Every 5 (Five) Minutes As Needed for Chest Pain. Take no more than 3 doses in 15 minutes.      • POTASSIUM CHLORIDE PO Take 10 mEq by mouth Daily.      • predniSONE (DELTASONE) 5 MG tablet Take 7.5 mg by mouth Daily With Breakfast.      • propranolol LA (INDERAL LA) 60 MG 24 hr capsule Take 60 mg by mouth Every 12 (Twelve) Hours.      • raNITIdine (ZANTAC) 300 MG tablet Take 300 mg by mouth 2 (Two) Times a Day.      • simvastatin (ZOCOR) 20 MG tablet Take 20 mg by mouth Every Night.            Subjective .   History of present illness:      Patient is a 77-year-old  female who we are seeing today for further evaluation of possible atrial fibrillation.  She has no previous history of any cardiac issues.  She has not will cardiac risk factors including hypertension, dyslipidemia, and diabetes.  She was in her usual state of health until 12/9/17.  On that day she was found by her family members to have left-sided weakness as well as mumbling speech.  Due to this she was brought to the hospital for further evaluation.  CT scans showed massive hemorrhage in multiple areas.  It is thus far been felt that this is possibly related to bleeds into ischemic infarcts.  Currently the patient is able to move her left side she does have some left-sided weakness.  When asked what happened to her she states that she \"fell\".  Prior to admission does have some complaints of occasional palpitations.  These are described as irregular heartbeats.  There were noted to not be very frequent.  Currently she is noted to be in sinus rhythm with frequent PACs.  There was notation " of possible atrial fibrillation on her echocardiogram telemetry as well as a reported instance of atrial fibrillation during K overfeed insert.  However, there is no clear documentation of atrial fibrillation.  Yesterday she was started on Cardizem for possible atrial fibrillation.  We have been asked to see her for further evaluation.      Social History     Social History   • Marital status:      Spouse name: N/A   • Number of children: 1   • Years of education: N/A     Occupational History   • housewife      Social History Main Topics   • Smoking status: Never Smoker   • Smokeless tobacco: Not on file   • Alcohol use No   • Drug use: No   • Sexual activity: Defer     Other Topics Concern   • Not on file     Social History Narrative     ×5 years. Does not drive. Lives next door to her sister-in-law.      Family History   Problem Relation Age of Onset   • Heart disease Mother          Review of Systems:  Review of Systems   Constitution: Positive for weakness and malaise/fatigue. Negative for fever.   HENT: Negative for nosebleeds.    Eyes: Negative for redness and visual disturbance.   Cardiovascular: Positive for palpitations. Negative for orthopnea and paroxysmal nocturnal dyspnea.   Respiratory: Negative for cough, snoring, sputum production and wheezing.    Hematologic/Lymphatic: Positive for bleeding problem.   Skin: Negative for flushing, itching and rash.   Musculoskeletal: Positive for falls. Negative for joint pain and muscle cramps.   Gastrointestinal: Positive for dysphagia. Negative for abdominal pain, diarrhea, heartburn, nausea and vomiting.   Genitourinary: Negative for hematuria.   Neurological: Positive for focal weakness. Negative for excessive daytime sleepiness, dizziness, headaches and tremors.   Psychiatric/Behavioral: Negative for substance abuse. The patient is not nervous/anxious.               Objective   Vitals:  Blood pressure 152/77, pulse 97, temperature 97.7 °F (36.5  "°C), temperature source Oral, resp. rate 18, height 157.5 cm (62\"), weight 67.8 kg (149 lb 7.6 oz), SpO2 96 %.     Intake/Output Summary (Last 24 hours) at 12/13/17 0965  Last data filed at 12/12/17 1800   Gross per 24 hour   Intake                0 ml   Output             1000 ml   Net            -1000 ml       Physical Exam   Constitutional: She is oriented to person, place, and time. She appears well-developed and well-nourished. No distress.   HENT:   Head: Normocephalic and atraumatic.   Eyes: Right eye exhibits no discharge. Left eye exhibits no discharge.   Neck: No JVD present. No tracheal deviation present.   Cardiovascular: Normal rate, normal heart sounds and intact distal pulses.  An irregular rhythm present. Exam reveals no friction rub.    No murmur heard.  Pulmonary/Chest: Effort normal and breath sounds normal. No respiratory distress.   Abdominal: Soft. Bowel sounds are normal. There is no tenderness.   Musculoskeletal: She exhibits no edema or deformity.   Neurological: She is alert and oriented to person, place, and time.   Left-sided weakness appreciated.  Mild left facial droop.   Skin: Skin is warm and dry.            Results Review:  I reviewed the patient's new clinical results.    Results from last 7 days  Lab Units 12/13/17  0450   WBC 10*3/mm3 7.62   HEMOGLOBIN g/dL 12.2   HEMATOCRIT % 38.1   PLATELETS 10*3/mm3 217       Results from last 7 days  Lab Units 12/13/17  0450  12/09/17  1539   SODIUM mmol/L 136  < > 135   POTASSIUM mmol/L 4.3  < > 3.7   CHLORIDE mmol/L 104  < > 102   CO2 mmol/L 27.0  < > 27.0   BUN mg/dL 28*  < > 11   CREATININE mg/dL 1.20  < > 1.00   CALCIUM mg/dL 8.7  < > 8.9   BILIRUBIN mg/dL  --   --  0.6   ALK PHOS U/L  --   --  75   ALT (SGPT) U/L  --   --  7   AST (SGOT) U/L  --   --  12   GLUCOSE mg/dL 180*  < > 166*   < > = values in this interval not displayed.    Results from last 7 days  Lab Units 12/13/17  0450   SODIUM mmol/L 136   POTASSIUM mmol/L 4.3   CHLORIDE " mmol/L 104   CO2 mmol/L 27.0   BUN mg/dL 28*   CREATININE mg/dL 1.20   GLUCOSE mg/dL 180*   CALCIUM mg/dL 8.7       Results from last 7 days  Lab Units 12/09/17  1539   INR  1.03       Lab Results  Lab Value Date/Time   TROPONINI 0.018 12/09/2017 1539           Results from last 7 days  Lab Units 12/10/17  0542   CHOLESTEROL mg/dL 172   TRIGLYCERIDES mg/dL 68   HDL CHOL mg/dL 54         ECHO:  · Left ventricular systolic function is normal. Estimated EF = 70%.  · Normal right ventricular cavity size, wall thickness, systolic function and septal motion noted.  · Saline test results are negative.  · The aortic valve exhibits sclerosis.  · No evidence of pulmonary hypertension is present.  · There is no evidence of pericardial effusion.      Tele:  SR with frequent PAC's     EKG: SR with PAC's, IRBBB, NST changes      Assessment/Plan     1. Reported atrial fibrillation. No current EKG evidence. ECHO reviewed and telemetry monitoring is poor. Could represent atrial fibrillation, but given that the patient is having frequent PAC's this is also a possibility.   2. Multiple areas of cerebral hemorrhage, possibly due to ischemic injury/ cardio embolic source. Neurosurgery is following.   3. Hypertension  4. Dyslipidemia  5. Dysphagia   6. Diabetes mellitus.      Plan:    1. Consider increasing Cardizem versus beta blockade initiation.  Not a candidate for any anticoagulation if atrial fibrillation present due to massive intracranial hemorrhage.      SHYANN Andrews obtained past medical, family history, social history, review of systems and functioned as a scribe for the remainder of the dictation for Dr. Haider.  I reviwed telemetry data on clinical access and she is clearlyhaving runs of a fib. Will start IV amiodarone to maintain sinus since she cannot take anticoagulation.Patient reports episodes of palpitations at home. D/c dilt.and start low dose metoprolol 12.5 bid.     SHYANN Andrews  12/13/17  9:34  AM    Dictated utilizing Dragon dictation

## 2017-12-13 NOTE — THERAPY TREATMENT NOTE
Acute Care - Physical Therapy Treatment Note  [facility]     Patient Name: Lois Brennan  : 1940  MRN: [id number]  Today's Date: 2017  Onset of Illness/Injury or Date of Surgery Date: 17  Date of Referral to PT: 17  Referring Physician: Dr. Espitia    Admit Date: 2017    Visit Dx:    ICD-10-CM ICD-9-CM   1. Intracranial hematoma, right, without loss of consciousness, initial encounter S06.340A 853.01   2. Impaired mobility and ADLs Z74.09 799.89   3. Impaired functional mobility, balance, gait, and endurance Z74.09 V49.89     Patient Active Problem List   Diagnosis   • Intracranial hemorrhage   • Essential hypertension   • Type 2 diabetes mellitus with complication, without long-term current use of insulin   • Pharyngeal dysphagia   • GERD (gastroesophageal reflux disease)   • Dementia   • Rheumatoid arthritis   • Paroxysmal atrial fibrillation               Adult Rehabilitation Note       17 0925 17 1100 17 0839    Rehab Assessment/Intervention    Discipline physical therapist  -LM speech language pathologist  -SM physical therapist  -MJ    Document Type therapy note (daily note)  -LM re-evaluation;therapy note (daily note)  - therapy note (daily note)  -    Subjective Information agree to therapy;no complaints  -LM  agree to therapy;no complaints  -MJ    Patient Effort, Rehab Treatment good  -LM  good  -MJ    Symptoms Noted During/After Treatment significant change in vital signs   HR would jump around from 150's-180's with amb  -LM  significant change in vital signs   monitor HR throughout  -MJ    Symptoms Noted Comment RN notified  -  RN informed and completed ambulation with PT/OT and pt  -MJ    Precautions/Limitations fall precautions;other (see comments)   Monitor HR closely  -LM      Recorded by [LM] Eugenia Valerio, PT [SM] Violet Hidalgo, MS CCC-SLP [MJ] Hamida De Paz, PT    Vital Signs    Pre Systolic BP Rehab 171  -LM  130  -MJ    Pre  Treatment Diastolic BP 67  -LM  65  -MJ    Post Systolic BP Rehab 155  -LM  130  -MJ    Post Treatment Diastolic BP 75  -LM  55  -MJ    Pretreatment Heart Rate (beats/min) 98  -LM      Intratreatment Heart Rate (beats/min) --   150's-180's w/ 60 feet of amb  -LM  160   questionable accuracy  -MJ    Posttreatment Heart Rate (beats/min) 102  -LM      Pre SpO2 (%) 96  -LM  98  -MJ    O2 Delivery Pre Treatment room air  -LM  room air  -MJ    Post SpO2 (%) 97  -LM  96  -MJ    O2 Delivery Post Treatment room air  -LM  room air  -MJ    Pre Patient Position Supine  -LM  Sitting  -MJ    Intra Patient Position Standing  -LM  Standing  -MJ    Post Patient Position Sitting  -LM  Sitting  -MJ    Recorded by [LM] Eugenia Valerio, PT  [MJ] Hamida De Paz, PT    Pain Assessment    Pain Assessment 0-10  -LM  0-10  -MJ    Pain Score 0  -LM  0  -MJ    Post Pain Score 0  -LM  0  -MJ    Pain Intervention(s)   Repositioned;Ambulation/increased activity  -MJ    Response to Interventions   tolerated  -MJ    Recorded by [LM] Eugenia Valerio, PT  [MJ] Hamida De Paz, PT    Vision Assessment/Intervention    Visual Impairment   decreased visual tracking   able to track to midline and slightly beyond with VCs  -MJ    Visual Impairment Comment   verbal and visual cues to track to midline and slightly beyond  -MJ    Recorded by   [MJ] Hamida De Paz PT    Cognitive Assessment/Intervention    Current Cognitive/Communication Assessment impaired  -LM  impaired  -MJ    Orientation Status oriented to;person;time;disoriented to;place  -LM  oriented to;person;place;disoriented to;time  -MJ    Follows Commands/Answers Questions 100% of the time;able to follow single-step instructions;needs cueing;needs increased time  -LM  able to follow single-step instructions;75% of the time;needs increased time;needs cueing  -MJ    Personal Safety moderate impairment;decreased awareness, need for safety  -LM  moderate impairment;decreased awareness, need for  assist;decreased awareness, need for safety;decreased insight to deficits  -    Personal Safety Interventions fall prevention program maintained;gait belt;muscle strengthening facilitated;nonskid shoes/slippers when out of bed  -  fall prevention program maintained;gait belt;muscle strengthening facilitated;nonskid shoes/slippers when out of bed  -MJ    Additional Documentation  Cognitive Assessment Intervention (Group)  -SM     Recorded by [LM] Eugenia Valerio, PT [SM] Violet Hidalgo MS CCC-SLP [MJ] Hamida De Paz, PT    Cognitive Assessment Intervention    Pragmatics  flat affect;initiation problems;topic maintenance problems;poor eye contact  -     Organization  other (see comments)   successfully completed word search puzzle  -SM     Recorded by  [SM] Violet Hidalgo MS CCC-SLP     Improve articulation    Improve articulation:  by over-articulating at word level;by over-articulating at phrase level  -SM     Status: Improve articulation  Progressing as expected  -SM     Articulation Progress  70%;with inconsistent cues  -SM     Recorded by  [SM] Violet Hidalgo MS CCC-SLP     Bed Mobility, Assessment/Treatment    Bed Mobility, Assistive Device bed rails;head of bed elevated;draw sheet  -      Bed Mobility, Scoot/Bridge, [place] maximum assist (25% patient effort);verbal cues required;nonverbal cues required (demo/gesture)  -      Bed Mob, Supine to Sit, [place] moderate assist (50% patient effort);verbal cues required  -LM  not tested  -MJ    Bed Mob, Sit to Supine, [place] not tested  -LM  not tested  -    Bed Mobility, Comment   UI  -MJ    Recorded by [LM] Eugenia Valerio, PT  [MJ] Hamida De Paz, PT    Transfer Assessment/Treatment    Transfers, Sit-Stand [place] minimum assist (75% patient effort);verbal cues required;nonverbal cues required (demo/gesture)  -  moderate assist (50% patient effort);verbal cues required  -    Transfers, Stand-Sit [place]  minimum assist (75% patient effort);verbal cues required;nonverbal cues required (demo/gesture)  -LM  moderate assist (50% patient effort);verbal cues required  -MJ    Transfers, Sit-Stand-Sit, Assist Device rolling walker   Assist for L hand placement  -LM  rolling walker   A for LUE placement and to maintain  -MJ    Transfer, Safety Issues step length decreased;balance decreased during turns;sequencing ability decreased  -LM  step length decreased;balance decreased during turns;sequencing ability decreased  -MJ    Transfer, Impairments strength decreased;impaired balance  -LM  strength decreased;impaired balance  -MJ    Transfer, Comment   sit to stand from bedside chair; VC's for hand positioning.  Initially posterior lean upon coming to stand.  -MJ    Recorded by [LM] Eugenia Valerio, PT  [MJ] Hamida De Paz, PT    Gait Assessment/Treatment    Gait, [place] moderate assist (50% patient effort);1 person + 1 person to manage equipment  -LM  moderate assist (50% patient effort);1 person + 1 person to manage equipment;verbal cues required   VC's to increase LLE step length  -MJ    Gait, Assistive Device rolling walker  -LM  rolling walker  -MJ    Gait, Distance (Feet) 60  -LM  75   15+60; seated rest break between  -MJ    Gait, Gait Pattern Analysis   swing-to gait   progressed to swing through LLE with VCs  -MJ    Gait, Gait Deviations bhupinder decreased;narrow base  -LM  bhupinder decreased;left:;decreased heel strike;step length decreased  -MJ    Gait, Safety Issues step length decreased;balance decreased during turns  -LM  step length decreased;balance decreased during turns  -MJ    Gait, Impairments strength decreased;impaired balance  -LM  strength decreased;impaired balance  -MJ    Gait, Comment Requires assist to maintain LUE on walker;  Pt leans posteriorly and to the left  -LM  requires A to maintain LUE hand placement on rwx; L lateral lean noted; responds well to VCs for increased LLE step length  and to look ahead  -    Recorded by [LM] Eugenia Valerio PT  [MJ] Hamida De Paz, PT    Stairs Assessment/Treatment    Stairs, [place] Level not tested  -      Recorded by [LM] Eugenia Valerio PT      Motor Skills/Interventions    Additional Documentation   Balance Skills Training (Group)  -    Recorded by   [MJ] Hamida De Paz PT    Balance Skills Training    Sitting-Level of Assistance Contact guard  -  Contact guard  -    Sitting-Balance Support Feet supported;Right upper extremity supported;Left upper extremity supported  -  Feet supported;Right upper extremity supported  -    Sitting-Balance Activities Trunk control activities  -  Trunk control activities  -    Standing-Level of Assistance Moderate assistance  -  Moderate assistance  -    Static Standing Balance Support assistive device  -  assistive device  -    Standing-Balance Activities Weight Shift A-P  -  Weight Shift R-L;Weight Shift A-P;Forward lean   forward lean to obtain center of balance due to post lean   -    Gait Balance-Level of Assistance Moderate assistance  -  Moderate assistance  -    Gait Balance Support assistive device  -  assistive device   req A to maintain LUE hand placement  -    Gait Balance Activities   scanning environment R/L  -    Recorded by [LM] Eugenia Valerio PT  [MJ] Hamida De Paz, PT    Therapy Exercises    Bilateral Lower Extremities AROM:;15 reps;sitting;ankle pumps/circles;glut sets;hip abduction/adduction;hip flexion;LAQ  -  sitting;AROM:;10 reps;ankle pumps/circles;LAQ  -    Recorded by [LM] Eugenia Valerio PT  [MJ] Hamida De Paz, PT    Positioning and Restraints    Pre-Treatment Position in bed  -  sitting in chair/recliner  -    Post Treatment Position chair  -  chair  -    In Chair reclined;call light within reach;encouraged to call for assist;exit alarm on;notified nsg  -  reclined;notified nsg;call light within reach;encouraged to call for assist;exit alarm  on;legs elevated  -MJ    Recorded by [LM] Eugenia Valerio, PT  [MJ] Hamida De Paz, PT      12/12/17 0835 12/11/17 0819       Rehab Assessment/Intervention    Discipline occupational therapist  -CL physical therapist  -MJ     Document Type therapy note (daily note)  -CL therapy note (daily note)  -MJ     Subjective Information agree to therapy;no complaints  -CL agree to therapy;complains of;pain   R shoulder  -MJ     Patient Effort, Rehab Treatment excellent  -CL good  -MJ     Symptoms Noted During/After Treatment significant change in vital signs  -CL significant change in vital signs   elevated HR  -MJ     Symptoms Noted Comment Noted increased HR throughout, RN aware and present.   -CL      Precautions/Limitations fall precautions;other (see comments)   L sided weakness/numbness, L neglect  -CL fall precautions;other (see comments)   L neglect  -MJ     Recorded by [CL] Martine Silverio OT [MJ] Hamida De Paz, PT     Vital Signs    Pre Systolic BP Rehab 130  -  -MJ     Pre Treatment Diastolic BP 65  -CL 54  -MJ     Post Systolic BP Rehab 130  -  -MJ     Post Treatment Diastolic BP 55  -CL 64  -MJ     Pretreatment Heart Rate (beats/min) 105  -  -MJ     Intratreatment Heart Rate (beats/min) 160   Pt w/ noted A-fib, RN present and aware  -  -MJ     Posttreatment Heart Rate (beats/min)  110  -MJ     Pre SpO2 (%) 98  -CL 97  -MJ     O2 Delivery Pre Treatment room air  -CL room air  -MJ     Post SpO2 (%) 96  -CL 97  -MJ     O2 Delivery Post Treatment room air  -CL room air  -MJ     Pre Patient Position Sitting  -CL Sitting  -MJ     Intra Patient Position Standing  -CL Standing  -MJ     Post Patient Position Standing  -CL Sitting  -MJ     Recorded by [CL] Martine Silverio, OT [MJ] Hamida De Paz, PT     Pain Assessment    Pain Assessment 0-10  -CL 0-10  -MJ     Pain Score 0  -CL 5  -MJ     Post Pain Score 0  -CL 5  -MJ     Pain Location  Shoulder   Right  -MJ     Pain Intervention(s)  Repositioned;Ambulation/increased activity  -CL Ambulation/increased activity;Repositioned  -MJ     Response to Interventions Tolerated.   -CL tolerated  -MJ     Recorded by [CL] Martine Silverio OT [MJ] Hamida De Paz, PT     Vision Assessment/Intervention    Visual Impairment left neglect  -CL decreased tracking across midline;decreased visual tracking   decreased tracking to L  -MJ     Visual Impairment Comment Pt demo tracking to L this date w/ MAX VCs/TCs.   -CL      Recorded by [CL] Martine Silverio OT [MJ] Hamida De Paz, PT     Cognitive Assessment/Intervention    Current Cognitive/Communication Assessment impaired  -CL impaired  -MJ     Orientation Status oriented to;person;place;disoriented to;time  -CL oriented to;person;place;required verbal cueing (specifiy in comments);disoriented to;time   VCs for name of hospital  -MJ     Follows Commands/Answers Questions 75% of the time;needs cueing;needs increased time;needs repetition  -CL able to follow single-step instructions;50% of the time;needs cueing;needs increased time;needs repetition  -MJ     Personal Safety moderate impairment;decreased awareness, need for assist;decreased awareness, need for safety;decreased insight to deficits  -CL moderate impairment;decreased awareness, need for assist;decreased awareness, need for safety  -MJ     Personal Safety Interventions fall prevention program maintained;gait belt;nonskid shoes/slippers when out of bed  -CL fall prevention program maintained;gait belt;muscle strengthening facilitated;nonskid shoes/slippers when out of bed  -MJ     Recorded by [CL] Martine Silverio OT [MJ] Hamida De Paz, PT     Bed Mobility, Assessment/Treatment    Bed Mob, Supine to Sit, [place]  not tested  -MJ     Bed Mobility, Comment UIC.   -CL UIC  -MJ     Recorded by [CL] Martine Silverio OT [MJ] Hamida De Paz, PT     Transfer Assessment/Treatment    Transfers, Sit-Stand [place] moderate assist (50% patient effort);2 person assist  required;verbal cues required  -CL moderate assist (50% patient effort)   initially with posterior lean; verbal & tactile cues needed  -MJ     Transfers, Stand-Sit [place] moderate assist (50% patient effort);2 person assist required;verbal cues required  -CL moderate assist (50% patient effort)  -MJ     Transfers, Sit-Stand-Sit, Assist Device rolling walker  -CL rolling walker   gait belt  -MJ     Transfer, Comment Pt performed x2 reps, noted intial posterior lean. VCs and assist to place L hand onto RW.   -CL attempted side steps to L for pivot to BSC but unable to initiate side steps completing forward steps.  Commode brought behind patient  -MJ     Recorded by [CL] Martine Silverio OT [MJ] Hamida De Paz, PT     Gait Assessment/Treatment    Gait, [place] Level  moderate assist (50% patient effort);1 person + 1 person to manage equipment   chair follow  -MJ     Gait, Assistive Device  rolling walker  -MJ     Gait, Distance (Feet)  15  -MJ     Gait, Gait Pattern Analysis  swing-to gait  -MJ     Gait, Gait Deviations  bhupinder decreased;double stance time increased;decreased heel strike  -MJ     Gait, Safety Issues  balance decreased during turns;sequencing ability decreased;step length decreased;weight-shifting ability decreased  -MJ     Gait, Impairments  strength decreased;impaired balance  -MJ     Gait, Comment  elevated HR limited distance  -MJ     Recorded by  [MJ] Hamida De Paz, PT     Functional Mobility    Functional Mobility- [place]. Level moderate assist (50% patient effort);2 person assist required;verbal cues required  -CL      Functional Mobility- Device rolling walker  -CL      Functional Mobility-Distance (Feet) 75  -CL      Functional Mobility- Comment Pt required 1 seated rest break. Pt requires constant assist to maintain grasp onto RW w/ L hand and assist to guide RW. Pt w/ significant L lateral LOB requiring Mod A for safety.   -CL      Recorded by [CL] Martine Silverio OT      Motor  Skills/Interventions    Additional Documentation Balance Skills Training (Group)  -CL      Recorded by [CL] Martine Silverio OT      Balance Skills Training    Sitting-Level of Assistance Contact guard  -CL Contact guard  -MJ     Sitting-Balance Support Right upper extremity supported;Feet supported  -CL Feet supported;Right upper extremity supported  -MJ     Sitting-Balance Activities Forward lean;Trunk control activities  -CL      Standing-Level of Assistance Moderate assistance  -CL Moderate assistance  -MJ     Static Standing Balance Support assistive device  -CL assistive device  -MJ     Standing-Balance Activities Weight Shift A-P;Weight Shift R-L  -CL Weight Shift R-L;Weight Shift A-P  -MJ     Gait Balance-Level of Assistance Moderate assistance  -CL Moderate assistance  -MJ     Gait Balance Support assistive device  -CL assistive device  -MJ     Gait Balance Activities scanning environment R/L  -CL      Recorded by [CL] Martine Silverio OT [MJ] Hamida De Paz, PT     Therapy Exercises    Bilateral Lower Extremities  sitting;AROM:;10 reps;ankle pumps/circles;LAQ  -MJ     Right Upper Extremity  sitting;AROM:;10 reps;elbow flexion/extension;shoulder extension/flexion  -MJ     Left Upper Extremity AAROM:;PROM:;10 reps;elbow flexion/extension;hand pumps;pronation/supination;shoulder extension/flexion;shoulder ER/IR   wrist F/E  -CL sitting;AAROM:;elbow flexion/extension;shoulder extension/flexion  -MJ     Recorded by [CL] Martine Silverio OT [MJ] Hamida De Paz, PT     Positioning and Restraints    Pre-Treatment Position sitting in chair/recliner  -CL sitting in chair/recliner  -MJ     Post Treatment Position chair  -CL chair  -MJ     In Chair notified nsg;reclined;call light within reach;encouraged to call for assist;exit alarm on;with PT  -CL notified nsg;sitting;call light within reach;encouraged to call for assist;exit alarm on;LUE elevated;legs elevated;heels elevated  -MJ     Recorded by [CL] Martine Silvreio OT [MJ]  Hamida De Paz, PT       User Key  (r) = Recorded By, (t) = Taken By, (c) = Cosigned By    Initials Name Effective Dates    SM Violet SONAL Hidalgo, MS CCC-SLP 06/22/15 -     LM Eugenia Valerio, PT 06/15/16 -     CL Martine Jean Claude, OT 06/08/16 -     MJ Hamida De Paz, PT 10/30/17 -                 IP PT Goals       12/13/17 0925 12/12/17 0924 12/11/17 0908    Transfer Training PT LTG    Transfer Training PT LTG, Outcome goal ongoing  -LM goal ongoing  -MJ goal ongoing  -MJ    Gait Training PT LTG    Gait Training Goal PT LTG, Outcome goal ongoing  -LM goal ongoing  -MJ goal ongoing  -MJ    Static Sitting Balance PT LTG    Static Sitting Balance PT LTG, Outcome goal ongoing  -LM goal ongoing  -MJ goal ongoing  -MJ      12/10/17 1021          Transfer Training PT LTG    Transfer Training PT LTG, Date Established 12/10/17  -SJ      Transfer Training PT LTG, Time to Achieve 2 wks  -SJ      Transfer Training PT LTG, Activity Type bed to chair /chair to bed;sit to stand/stand to sit  -SJ      Transfer Training PT LTG, [place] Level contact guard assist  -SJ      Transfer Training PT LTG, Outcome goal ongoing  -SJ      Gait Training PT LTG    Gait Training Goal PT LTG, Date Established 12/10/17  -SJ      Gait Training Goal PT LTG, Time to Achieve 2 wks  -SJ      Gait Training Goal PT LTG, [place] Level minimum assist (75% patient effort);2 person assist required  -SJ      Gait Training Goal PT LTG, Distance to Achieve 100  -SJ      Gait Training Goal PT LTG, Outcome goal ongoing  -SJ      Static Sitting Balance PT LTG    Static Sitting Balance PT LTG, Date Established 12/10/17  -SJ      Static Sitting Balance PT LTG, Time to Achieve 2 wks  -SJ      Static Sitting Balance PT LTG, [place] Level supervision required  -SJ      Static Sitting Balance PT LTG, Assist Device UE Support  -SJ      Static Sitting Balance PT LTG, Outcome goal ongoing  -SJ        User Key  (r) = Recorded By, (t) = Taken By, (c) = Cosigned By     Initials Name Provider Type    SJ Yumiko Arboleda, PT Physical Therapist     Eugenia Valerio, PT Physical Therapist     Hamida De Paz, PT Physical Therapist          Physical Therapy Education     Title: PT OT SLP Therapies (Done)     Topic: Physical Therapy (Done)     Point: Mobility training (Done)    Learning Progress Summary    Learner Readiness Method Response Comment Documented by Status   Patient Acceptance E DU,NR   12/13/17 1010 Done    Acceptance E ,[facility] 12/12/17 2007 Done    Acceptance E [facility] 12/12/17 0924 Active    Acceptance E [facility] 12/11/17 0908 Active    Acceptance E [facility] 12/10/17 1025 Active               Point: Home exercise program (Done)    Learning Progress Summary    Learner Readiness Method Response Comment Documented by Status   Patient Acceptance E DU,NR   12/13/17 1010 Done    Acceptance E VU,[facility] 12/12/17 2007 Done    Acceptance E [facility] 12/12/17 0924 Active    Acceptance E [facility] 12/11/17 0908 Active    Acceptance E [facility] 12/10/17 1025 Active               Point: Body mechanics (Done)    Learning Progress Summary    Learner Readiness Method Response Comment Documented by Status   Patient Acceptance E VU,[facility] 12/12/17 2007 Done    Acceptance E [facility] 12/12/17 0924 Active    Acceptance E [facility] 12/11/17 0908 Active    Acceptance E [facility] 12/10/17 1025 Active               Point: Precautions (Done)    Learning Progress Summary    Learner Readiness Method Response Comment Documented by Status   Patient Acceptance E DU,NR   12/13/17 1010 Done    Acceptance E VU,[facility] 12/12/17 2007 Done    Acceptance E [facility] 12/12/17 0924 Active    Acceptance E [facility] 12/11/17 0908 Active    Acceptance E [facility] 12/10/17 1025 Active                      User Key     Initials Effective Dates Name Provider Type Discipline     06/19/15 -  Yumiko Arboleda, PT Physical Therapist PT     06/15/16 -  Eugenia Valerio, PT Physical Therapist PT     02/23/17 -  Kyung Das RN Registered  Nurse Nurse     10/30/17 -  Hamida De Paz, PT Physical Therapist PT                    PT Recommendation and Plan  Anticipated Equipment Needs At Discharge: other (see comments) (TBD)  Anticipated Discharge Disposition: inpatient rehabilitation facility  Planned Therapy Interventions: balance training, bed mobility training, gait training, home exercise program, neuromuscular re-education, patient/family education, strengthening, transfer training  PT Frequency: daily, per priority policy  Plan of Care Review  Plan Of Care Reviewed With: patient  Outcome Summary/Follow up Plan: Pt transferred supine-->sit with ModA, stood with [place], and ambulated 60 feet with ModA.  Pt continues to demonstrate significant posterior lean and leans laterally to the L.  HR jumped around from 150's-180's with ambulation - therefore gait ceased.  Pt progressing and will continue to benefit from skilled PT to improve mobility and safety.          Outcome Measures       12/13/17 0925 12/12/17 0839 12/12/17 0835    How much help from another person do you currently need...    Turning from your back to your side while in flat bed without using bedrails? 2  -LM 2  -MJ     Moving from lying on back to sitting on the side of a flat bed without bedrails? 2  -LM 2  -MJ     Moving to and from a bed to a chair (including a wheelchair)? 2  -LM 2  -MJ     Standing up from a chair using your arms (e.g., wheelchair, bedside chair)? 3  -LM 2  -MJ     Climbing 3-5 steps with a railing? 1  -LM 1  -MJ     To walk in hospital room? 2  -LM 2  -MJ     AM-PAC 6 Clicks Score 12  -LM 11  -MJ     How much help from another is currently needed...    Putting on and taking off regular lower body clothing?   1  -CL    Bathing (including washing, rinsing, and drying)   2  -CL    Toileting (which includes using toilet bed pan or urinal)   1  -CL    Putting on and taking off regular upper body clothing   2  -CL    Taking care of personal grooming (such as brushing  teeth)   2  -CL    Eating meals   1  -CL    Score   9  -CL    Modified Graham Scale    Modified Graham Scale 4 - Moderately severe disability.  Unable to walk without assistance, and unable to attend to own bodily needs without assistance.  -LM 4 - Moderately severe disability.  Unable to walk without assistance, and unable to attend to own bodily needs without assistance.  -MJ 4 - Moderately severe disability.  Unable to walk without assistance, and unable to attend to own bodily needs without assistance.  -CL    Functional Assessment    Outcome Measure Options AM-PAC 6 Clicks Basic Mobility (PT);Modified Graham  -LM AM-PAC 6 Clicks Basic Mobility (PT);Modified Graham  -MJ       12/11/17 0819          How much help from another person do you currently need...    Turning from your back to your side while in flat bed without using bedrails? 2  -MJ      Moving from lying on back to sitting on the side of a flat bed without bedrails? 2  -MJ      Moving to and from a bed to a chair (including a wheelchair)? 2  -MJ      Standing up from a chair using your arms (e.g., wheelchair, bedside chair)? 2  -MJ      Climbing 3-5 steps with a railing? 1  -MJ      To walk in hospital room? 2  -MJ      AM-PAC 6 Clicks Score 11  -MJ      Modified Graham Scale    Modified Stanton Scale 4 - Moderately severe disability.  Unable to walk without assistance, and unable to attend to own bodily needs without assistance.  -MJ      Functional Assessment    Outcome Measure Options AM-PAC 6 Clicks Basic Mobility (PT);Modified Graham  -MJ        User Key  (r) = Recorded By, (t) = Taken By, (c) = Cosigned By    Initials Name Provider Type    LM Eugenia Valerio, PT Physical Therapist    CL Martine Silverio, OT Occupational Therapist    MJ Hamida De Paz, PT Physical Therapist           Time Calculation:         PT Charges       12/13/17 0925          Time Calculation    Start Time 0925  -      PT Received On 12/13/17  -      PT Goal Re-Cert Due Date  12/20/17  -LM      Time Calculation- PT    Total Timed Code Minutes- PT 29 minute(s)  -LM        User Key  (r) = Recorded By, (t) = Taken By, (c) = Cosigned By    Initials Name Provider Type    CARLEY Valerio PT Physical Therapist          Therapy Charges for Today     Code Description Service Date Service Provider Modifiers Qty    43405158029 HC GAIT TRAINING EA 15 MIN 12/13/2017 Eugenia Valerio, PT GP 1    68415218333 HC PT THER PROC EA 15 MIN 12/13/2017 Eugenia Valerio PT GP 1    52291556277 HC PT THER SUPP EA 15 MIN 12/13/2017 Eugenia Valerio PT GP 1          PT G-Codes  Outcome Measure Options: AM-PAC 6 Clicks Basic Mobility (PT), Shabbir Valerio, MAC  12/13/2017

## 2017-12-13 NOTE — PROGRESS NOTES
Multidisciplinary Rounds    Time: 20min  Patient Name: Lois Brennan  Date of Encounter: 12/13/17 4:24 PM  MRN: [id number]  Admission date: 12/9/2017      Reason for visit: MDR.     Additional information obtained during MDR:   Pt eating well; ready for transfer to floor    Current diet: Diet Dysphagia; III - Pureed With Some Mashed; Nectar / Syrup Thick; Cardiac, Consistent Carbohydrate      Intervention:  Follow treatment plan  Care plan reviewed    Follow up:   Rd per protocol      Heather Kerns RD  4:24 PM

## 2017-12-13 NOTE — PROGRESS NOTES
"NEUROSURGERY PROGRESS NOTE     LOS: 4 days   Patient Care Team:  No Known Provider as PCP - General    Chief Complaint: Left-sided weakness and neglect.    Interval History:   Patient Complaints: None.  Patient Denies: Headache, nausea, or vomiting.    Review of Systems:   Musculoskeletal and Neurological systems were reviewed and are negative except for:  Neurological: positive for weakness    Vital Signs: Blood pressure 138/75, pulse 58, temperature 97.7 °F (36.5 °C), temperature source Oral, resp. rate 16, height 157.5 cm (62\"), weight 67.8 kg (149 lb 7.6 oz), SpO2 94 %.  Intake/Output:   Intake/Output Summary (Last 24 hours) at 12/13/17 0558  Last data filed at 12/12/17 0600   Gross per 24 hour   Intake                0 ml   Output              300 ml   Net             -300 ml       Physical Exam:  The patient awakens easily and follows simple commands.  She is oriented to place and the circumstances of her admission.  Her speech is fluent.  She continues to have some right gaze preference.  Left hemiparesis is present.  Strength on the left is approximately 3-4 minus/5.     Data Review:      Results from last 7 days  Lab Units 12/13/17  0450   SODIUM mmol/L 136   POTASSIUM mmol/L 4.3   CHLORIDE mmol/L 104   CO2 mmol/L 27.0   BUN mg/dL 28*   CREATININE mg/dL 1.20   GLUCOSE mg/dL 180*   CALCIUM mg/dL 8.7         Assessment/Plan:  1.  Multiple areas of intracranial hemorrhage with the largest being a right frontal ICH.  Multiple areas of hemorrhage potentially do to hemorrhagic conversion of embolic infarcts. Cardiac echo demonstrated atrial fibrillation.  2.  Atrial fibrillation.  3.  Dysphagia: Dysphagia diet.  4.  Hypertension.  5.  Disposition: Okay for floor/telemetry from my standpoint.         Leo Proctor MD  12/13/17  5:58 AM      "

## 2017-12-13 NOTE — PLAN OF CARE
Problem: Patient Care Overview (Adult)  Goal: Plan of Care Review  Outcome: Ongoing (interventions implemented as appropriate)    12/13/17 0925   Coping/Psychosocial Response Interventions   Plan Of Care Reviewed With patient   Outcome Evaluation   Outcome Summary/Follow up Plan Pt transferred supine-->sit with ModA, stood with Jacobo, and ambulated 60 feet with ModA. Pt continues to demonstrate significant posterior lean and leans laterally to the L. HR jumped around from 150's-180's with ambulation - therefore gait ceased. Pt progressing and will continue to benefit from skilled PT to improve mobility and safety.         Problem: Inpatient Physical Therapy  Goal: Transfer Training Goal 1 LTG- PT  Outcome: Ongoing (interventions implemented as appropriate)    12/10/17 1021 12/13/17 0925   Transfer Training PT LTG   Transfer Training PT LTG, Date Established 12/10/17 --    Transfer Training PT LTG, Time to Achieve 2 wks --    Transfer Training PT LTG, Activity Type bed to chair /chair to bed;sit to stand/stand to sit --    Transfer Training PT LTG, [place] Level contact guard assist --    Transfer Training PT LTG, Outcome --  goal ongoing       Goal: Gait Training Goal LTG- PT  Outcome: Ongoing (interventions implemented as appropriate)    12/10/17 1021 12/13/17 0925   Gait Training PT LTG   Gait Training Goal PT LTG, Date Established 12/10/17 --    Gait Training Goal PT LTG, Time to Achieve 2 wks --    Gait Training Goal PT LTG, [place] Level minimum assist (75% patient effort);2 person assist required --    Gait Training Goal PT LTG, Distance to Achieve 100 --    Gait Training Goal PT LTG, Outcome --  goal ongoing       Goal: Static Sitting Balance Goal LTG- PT  Outcome: Ongoing (interventions implemented as appropriate)    12/10/17 1021 12/13/17 0925   Static Sitting Balance PT LTG   Static Sitting Balance PT LTG, Date Established 12/10/17 --    Static Sitting Balance PT LTG, Time to Achieve 2 wks --     Static Sitting Balance PT LTG, [place] Level supervision required --    Static Sitting Balance PT LTG, Assist Device UE Support --    Static Sitting Balance PT LTG, Outcome --  goal ongoing

## 2017-12-13 NOTE — PROGRESS NOTES
Continued Stay Note  [facility]     Patient Name: Lois Brennan  MRN: [id number]  Today's Date: 12/13/2017    Admit Date: 12/9/2017          Discharge Plan       12/13/17 1431    Case Management/Social Work Plan    Additional Comments Hope to transfer to [facility] 12/14 if insurance approves.  Her daughter, Keely can drive her via car.              Discharge Codes     None        Expected Discharge Date and Time     Expected Discharge Date Expected Discharge Time    Dec 14, 2017             Nnamdi Hatch RN

## 2017-12-14 LAB
GLUCOSE BLDC GLUCOMTR-MCNC: 146 MG/DL (ref 70–130)
GLUCOSE BLDC GLUCOMTR-MCNC: 158 MG/DL (ref 70–130)
GLUCOSE BLDC GLUCOMTR-MCNC: 177 MG/DL (ref 70–130)
GLUCOSE BLDC GLUCOMTR-MCNC: 186 MG/DL (ref 70–130)

## 2017-12-14 PROCEDURE — 63710000001 PREDNISONE PER 5 MG: Performed by: INTERNAL MEDICINE

## 2017-12-14 PROCEDURE — 99232 SBSQ HOSP IP/OBS MODERATE 35: CPT | Performed by: INTERNAL MEDICINE

## 2017-12-14 PROCEDURE — 82962 GLUCOSE BLOOD TEST: CPT

## 2017-12-14 RX ORDER — AMIODARONE HYDROCHLORIDE 400 MG/1
400 TABLET ORAL EVERY 12 HOURS SCHEDULED
Start: 2017-12-14 | End: 2018-01-05 | Stop reason: HOSPADM

## 2017-12-14 RX ORDER — AMLODIPINE BESYLATE 10 MG/1
10 TABLET ORAL
Status: DISCONTINUED | OUTPATIENT
Start: 2017-12-14 | End: 2017-12-15 | Stop reason: HOSPADM

## 2017-12-14 RX ADMIN — AMIODARONE HYDROCHLORIDE 400 MG: 200 TABLET ORAL at 20:40

## 2017-12-14 RX ADMIN — PREDNISONE 7.5 MG: 5 TABLET ORAL at 08:30

## 2017-12-14 RX ADMIN — AMIODARONE HYDROCHLORIDE 400 MG: 200 TABLET ORAL at 08:29

## 2017-12-14 RX ADMIN — METOPROLOL TARTRATE 12.5 MG: 25 TABLET, FILM COATED ORAL at 20:40

## 2017-12-14 RX ADMIN — HYDRALAZINE HYDROCHLORIDE 25 MG: 25 TABLET ORAL at 06:11

## 2017-12-14 RX ADMIN — FAMOTIDINE 20 MG: 20 TABLET, FILM COATED ORAL at 20:40

## 2017-12-14 RX ADMIN — METOPROLOL TARTRATE 12.5 MG: 25 TABLET, FILM COATED ORAL at 08:29

## 2017-12-14 RX ADMIN — AMLODIPINE BESYLATE 10 MG: 10 TABLET ORAL at 12:20

## 2017-12-14 RX ADMIN — HYDRALAZINE HYDROCHLORIDE 25 MG: 25 TABLET ORAL at 21:22

## 2017-12-14 RX ADMIN — FAMOTIDINE 20 MG: 20 TABLET, FILM COATED ORAL at 08:30

## 2017-12-14 RX ADMIN — HYDRALAZINE HYDROCHLORIDE 25 MG: 25 TABLET ORAL at 14:17

## 2017-12-14 NOTE — PROGRESS NOTES
Continued Stay Note  [facility]     Patient Name: Lois Brennan  MRN: [id number]  Today's Date: 12/14/2017    Admit Date: 12/9/2017          Discharge Plan       12/14/17 1018    Case Management/Social Work Plan    Additional Comments May be difficult to transport via car. Plan to transport via [facility] ambulance at 1700.                Discharge Codes     None        Expected Discharge Date and Time     Expected Discharge Date Expected Discharge Time    Dec 14, 2017             Nnamdi Hatch RN

## 2017-12-14 NOTE — PLAN OF CARE
Problem: Patient Care Overview (Adult)  Goal: Plan of Care Review  Outcome: Ongoing (interventions implemented as appropriate)  Goal: Discharge Needs Assessment  Outcome: Ongoing (interventions implemented as appropriate)    Problem: Fall Risk (Adult)  Goal: Absence of Falls  Outcome: Ongoing (interventions implemented as appropriate)    Problem: Skin Integrity Impairment, Risk/Actual (Adult)  Goal: Identify Related Risk Factors and Signs and Symptoms  Outcome: Ongoing (interventions implemented as appropriate)  Goal: Skin Integrity/Wound Healing  Outcome: Ongoing (interventions implemented as appropriate)    Problem: Stroke (Hemorrhagic) (Adult)  Goal: Signs and Symptoms of Listed Potential Problems Will be Absent or Manageable (Stroke)  Outcome: Ongoing (interventions implemented as appropriate)

## 2017-12-14 NOTE — PROGRESS NOTES
Multidisciplinary Rounds    Time: 20min  Patient Name: Lois Brennan  Date of Encounter: 12/14/17 2:35 PM  MRN: [id number]  Admission date: 12/9/2017      Reason for visit: MDR.    Additional information obtained during MDR:   RN reports pt continues to improve neurologically; ate well at breakfast this morning.  Plan to discharge to [facility] this afternoon.    Current diet: Diet Dysphagia; III - Pureed With Some Mashed; Nectar / Syrup Thick; Cardiac, Consistent Carbohydrate      Intervention:  Follow treatment plan  Care plan reviewed    Follow up:   RD to follow per protocol      Heather Kerns RD  2:35 PM

## 2017-12-14 NOTE — DISCHARGE SUMMARY
Transfer Summary    Patient name: Lois Brennan     CSN: [id number]     MRN: [id number]     : 1940     Today's date: 2017     Date of Admission: 2017     Date of Discharge:  2017    Admitting Physician:  Aury Espitia MD    Primary Care Provider: No Known Provider     Consultations:  Clifton Haider MD, Cardiology     Leo Proctor MD, Neurosurgery      Admission Diagnosis: Intracranial Hemorrhage     Transfer Diagnoses:   Hospital Problem List     * (Principal)Intracranial hemorrhage    Essential hypertension    Type 2 diabetes mellitus with complication, without long-term current use of insulin    Pharyngeal dysphagia    GERD (gastroesophageal reflux disease)    Dementia    Rheumatoid arthritis    Paroxysmal atrial fibrillation          Allergies:  Ace inhibitors and Penicillins    Code Status:  Full Code    Procedures:         History of Present Illness:    Patient is a 77 y.o. female was not answering her daughter this morning. She lives next door to her sister-in-law who has a key. When she entered the house the patient was still in bed and could not move her left side and was mumbling. She called the ambulance. She did talk to a family member on the phone last night and was normal. In the emergency room her CT scan revealed a right frontal lobe hemorrhage with edema and mass effect. She had significant hypertension and was started on a Cardene drip. Initially she was not moving her left side but her symptoms did not improve and she was able to lift her left leg. She does have a history of transient ischemic attacks and had one approximately one week ago. There is no history of atrial fibrillation she is a lifetime nonsmoker. She does use some nitroglycerin but denies ever having a heart attack. She is a lifetime nonsmoker.    Hospital Course:    The patient was started on a Cardene drip for hypertension and admitted to the neuro ICU for close monitoring. Neurosurgery  "was consulted however she did not require surgical intervention as the area of hemorrhage remained stable.  Physical, occupational, and speech therapy were consulted and worked with the patient daily.  By the day of discharge she was able to sit with moderate assistance and ambulate 60 feet with moderate assistance.  She initially had significant dysphasia and required enteral feeding but on repeat evaluation she had inconsistent aspiration with thin liquids but was able to progress to a dysphagia level III purée with nectar thick liquids.  She was seen by cardiology for onset of atrial fibrillation and was started on amiodarone.  Echocardiogram showed an EF of 70% with normal wall thickness and ventricular cavity size.  On 12/14 she was felt to have received maximal benefit from hospitalization and was felt ready for transfer to [facility] for further rehabilitation.  She will be transferred today via ambulance.      Vitals:  /98  Pulse 67  Temp 98.3 °F (36.8 °C) (Oral)   Resp 16  Ht 157.5 cm (62\")  Wt 67.8 kg (149 lb 7.6 oz)  SpO2 99%  BMI 27.34 kg/m2    Physical Exam:  Constitutional:  Appears well-developed and well-nourished. No distress.   HEENT:  Normocephalic and atraumatic. PERRL  Neck: Neck supple. No JVD present.   CV: Normal rate, regular rhythm, intact distal pulses.  No gallop, murmur, or rub.  Pulmonary/Chest: Effort normal and breath sounds normal. No respiratory distress. No wheezes, rhonchi or rales.    Abdominal: Soft. +BS. No distension and no mass. There is no tenderness.   Musculoskeletal: Normal muscle tone and strength  Neurological:  Alert and oriented to person, place, and time.  2/5 LUE, 4/5 LLE, left facial droop.   Skin: Skin is warm and dry. No rash noted.   Extremities:  No clubbing, edema or cyanosis  Psychiatric: Normal mood and affect. Behavior is normal.     Labs:    Results from last 7 days  Lab Units 12/13/17  0450   WBC 10*3/mm3 7.62   HEMOGLOBIN g/dL " 12.2   HEMATOCRIT % 38.1   PLATELETS 10*3/mm3 217       Results from last 7 days  Lab Units 12/13/17  0450  12/09/17  1539   SODIUM mmol/L 136  < > 135   POTASSIUM mmol/L 4.3  < > 3.7   CHLORIDE mmol/L 104  < > 102   CO2 mmol/L 27.0  < > 27.0   BUN mg/dL 28*  < > 11   CREATININE mg/dL 1.20  < > 1.00   CALCIUM mg/dL 8.7  < > 8.9   BILIRUBIN mg/dL  --   --  0.6   ALK PHOS U/L  --   --  75   ALT (SGPT) U/L  --   --  7   AST (SGOT) U/L  --   --  12   GLUCOSE mg/dL 180*  < > 166*   < > = values in this interval not displayed.      Magnesium   Date Value Ref Range Status   12/13/2017 2.2 1.3 - 2.7 mg/dL Final     Phosphorus   Date Value Ref Range Status   12/13/2017 4.4 2.4 - 5.1 mg/dL Final        Transfer Medications:   Lios Brennan   Home Medication Instructions KIMBERLEE:[id number]    Printed on:12/14/17 1124   Medication Information                      amiodarone (PACERONE) 400 MG tablet  Take 1 tablet by mouth Every 12 (Twelve) Hours.             amLODIPine (NORVASC) 2.5 MG tablet  Take 2.5 mg by mouth Daily.             donepezil (ARICEPT) 10 MG tablet  Take 10 mg by mouth Every Night.             metFORMIN ER (GLUCOPHAGE-XR) 500 MG 24 hr tablet  Take 500 mg by mouth Daily With Breakfast.             metFORMIN ER (GLUCOPHAGE-XR) 500 MG 24 hr tablet  Take 1,000 mg by mouth Daily With Dinner.             metoprolol tartrate (LOPRESSOR) 25 MG tablet  Take 0.5 tablets by mouth Every 12 (Twelve) Hours.             POTASSIUM CHLORIDE PO  Take 10 mEq by mouth Daily.             predniSONE (DELTASONE) 5 MG tablet  Take 7.5 mg by mouth Daily With Breakfast.             raNITIdine (ZANTAC) 300 MG tablet  Take 300 mg by mouth 2 (Two) Times a Day.             simvastatin (ZOCOR) 20 MG tablet  Take 20 mg by mouth Every Night.                 Diet:   Diet Instructions     Diet: Consistent Carbohydrate, Cardiac, Dysphagia; Nectar / Syrup Thick Liquids; Pureed With Some Mashed; Nectar / Syrup Thick       Discharge Diet:    Consistent Carbohydrate  Cardiac  Dysphagia      Fluid Consistency:  Nectar / Syrup Thick Liquids   Pureed Options:  Pureed With Some Mashed   Fluid Consistency:  Nectar / Syrup Thick                 Activity at Transfer:    Activity Instructions     Activity as Tolerated                     Follow-up Appointments  Future Appointments  Date Time Provider Department Center   1/5/2018 12:30 PM JULIAN Jarvis [facility] None   1/23/2018 3:30 PM Soy Bauman III, MD [facility] None     Additional Instructions for the Follow-ups that You Need to Schedule     Discharge Follow-up with Specified Provider: Ms. Sheila JORDAN in Dr. Proctor's office; 3 Weeks    As directed    To:  Ms. Sheila JORDAN in Dr. Proctor's office    Follow Up:  3 Weeks    Follow Up Details:  With CT of the brain without contrast                     Transfer Instructions:  Transfer to [facility] today via ambulance at 5 PM       SHYANN Shrestha, [facility]  Pulmonary & Critical Care Medicine  I performed an independent history and physical examination. Portions of the history were obtained by APRN and were modified by me according to my findings. The above note reflects my findings, assessment, and plan.    Aquiles Silva MD    See my daily progress note from today for details and my discharge time documentation.    Aquiles Silav MD 12/14/17 1:32 PM      CC: No Known Provider           .

## 2017-12-14 NOTE — PROGRESS NOTES
Discharge Planning Assessment  [facility]     Patient Name: Lois Brennan  MRN: [id number]  Today's Date: 12/14/2017    Admit Date: 12/9/2017          Discharge Needs Assessment     None            Discharge Plan       12/14/17 4955    Case Management/Social Work Plan    Plan update    Additional Comments Call received from [facility] on 2B. Facility states they do not have a confirmed bed at [facility] today. Transport to be cancelled as well as d/c.        Discharge Placement     No information found        Expected Discharge Date and Time     Expected Discharge Date Expected Discharge Time    Dec 14, 2017               Demographic Summary     None            Functional Status     None            Psychosocial     None            Abuse/Neglect     None            Legal     None            Substance Abuse     None            Patient Forms     None          Jenna A. Elliott

## 2017-12-14 NOTE — PLAN OF CARE
Problem: Stroke (Hemorrhagic) (Adult)  Goal: Signs and Symptoms of Listed Potential Problems Will be Absent or Manageable (Stroke)  Outcome: Ongoing (interventions implemented as appropriate)

## 2017-12-14 NOTE — PROGRESS NOTES
Pulmonary/Critical Care Follow-up     LOS: 5 days   Patient Care Team:  No Known Provider as PCP - General        Chief Complaint   Patient presents with   • Stroke     Subjective    77-year-old woman with a history of diabetes, RA on chronic low dose prednisone, HTN who presented with left sided weakness, right frontal lobe hemorrhage with edema. She had a preceding episode of transient facial weakness earlier this week.  CT scan revealed a 3.7 x 3.8 cm frontal hemorrhage with some right to left shift and a smaller posterior and superior hemorrhage.    Patient had atrial fibrillation during her echocardiogram.      Interval History:  Patient is doing well this morning.  She worked with physical therapy.  She is tolerating her diet.  Blood pressure remains somewhat elevated.  Heart rate is stable after initiation of amiodarone.  Plan is to transfer the patient to [facility] this afternoon.  No headache, nausea, or vomiting.    History taken from: Chart/patient    PMH/FH/Social History were reviewed and updated appropriately in the electronic medical record.     Review of Systems:    Review of 14 systems was completed with positives and pertinent negatives noted in the subjective section.  All other systems reviewed and are negative.   Exceptions are noted below:    Unable to obtain secondary to confusion      Objective     Vital Signs  Temp:  [97.9 °F (36.6 °C)-98.3 °F (36.8 °C)] 98.3 °F (36.8 °C)  Heart Rate:  [46-78] 67  Resp:  [14-18] 18  BP: (114-166)/(55-99) 158/88  12/13 0701 - 12/14 0700  In: 708 [P.O.:708]  Out: 50 [Urine:50]  Body mass index is 27.34 kg/(m^2).     Physical Exam:     Constitutional:    Alert, cooperative, in no acute distress   Head:    Normocephalic, left facial droop    Eyes:            Lids and lashes normal, conjunctivae and sclerae normal, no   icterus, no pallor, corneas clear, PER   ENMT:   Ears appear intact with no abnormalities noted      No oral lesions,  no thrush, oral mucosa moist   Neck:   No adenopathy, supple, trachea midline, no thyromegaly, no JVD       Lungs/Resp:     Normal effort, symmetric chest rise, no crepitus, clear to      auscultation bilaterally, no chest wall tenderness                Heart/CV:    Tachycardic, irregularly irregular, normal S1 and S2, no            murmur   Abdomen/GI:     Normal bowel sounds, no masses, no organomegaly, soft,        non-tender, non-distended   :     Deferred   Extremities/MSK:   No clubbing or cyanosis.  No edema.  Normal tone.  No deformities.    Pulses:   Pulses palpable and equal bilaterally   Skin:   No bleeding, bruising or rash   Heme/Lymph:   No cervical or supraclavicular adenopathy.    Neurologic:    Psychiatric:       Left side weakness 2/5 LUE  4/5 LLE, left facial droop.      Normal affect.             Results Review:     I reviewed the patient's new clinical results.     Results from last 7 days  Lab Units 12/13/17 0450 12/11/17 0405 12/09/17  1539   SODIUM mmol/L 136 138 135   POTASSIUM mmol/L 4.3 4.0 3.7   CHLORIDE mmol/L 104 108 102   CO2 mmol/L 27.0 22.0 27.0   BUN mg/dL 28* 21 11   CREATININE mg/dL 1.20 1.00 1.00   CALCIUM mg/dL 8.7 8.4* 8.9   BILIRUBIN mg/dL  --   --  0.6   ALK PHOS U/L  --   --  75   ALT (SGPT) U/L  --   --  7   AST (SGOT) U/L  --   --  12   GLUCOSE mg/dL 180* 172* 166*       Results from last 7 days  Lab Units 12/13/17 0450 12/12/17  0434 12/11/17  0405   WBC 10*3/mm3 7.62 8.66 10.60   HEMOGLOBIN g/dL 12.2 10.9* 11.2*   HEMATOCRIT % 38.1 33.3* 34.8   PLATELETS 10*3/mm3 217 201 209           Results from last 7 days  Lab Units 12/13/17  0450 12/11/17  0405   MAGNESIUM mg/dL 2.2 2.0   PHOSPHORUS mg/dL 4.4 3.4       Results from last 7 days  Lab Units 12/10/17  0542   HEMOGLOBIN A1C % 7.00*       I reviewed the patient's new imaging including images and reports.    CT head 12/10/17:  IMPRESSION:  No interval change is noted in the intra-axial and  extracerebral bleed complex  described above. There is no evidence of  progressive hemorrhage, progressive edema or progressive mass effect.    CT head 12/9/17:  IMPRESSION:      Intra-axial cerebral bleed, right frontal parietal, with surrounding  edema and mass effect. Measurements and volumetric measurements are  offered above.      There is a 14 mm high attenuation structure adjacent to the posterior  right falx cerebri which could be a densely calcified meningioma or  second hemorrhage.      There is a small area of subarachnoid bleed along the right parietal  convexity in the sulcal GYRAL recesses.      There is considerable mass effect in the right hemisphere.    Echocardiogram 12/9/17:  · Left ventricular systolic function is normal. Estimated EF = 70%.  · Normal right ventricular cavity size, wall thickness, systolic function and septal motion noted.  · Saline test results are negative.  · The aortic valve exhibits sclerosis.  · No evidence of pulmonary hypertension is present.  · There is no evidence of pericardial effusion.  · Atrial fibrillation noted as primary rhythm.      Medication Review:     amiodarone 400 mg Oral Q12H   amLODIPine 10 mg Oral Q24H   famotidine 20 mg Oral Q12H   hydrALAZINE 25 mg Oral Q8H   insulin regular 0-9 Units Subcutaneous 4x Daily AC & at Bedtime   metoprolol tartrate 12.5 mg Oral Q12H   predniSONE 7.5 mg Oral Daily          Assessment/Plan     Principal Problem:    Intracranial hemorrhage  Active Problems:    Essential hypertension    Type 2 diabetes mellitus with complication, without long-term current use of insulin    Pharyngeal dysphagia    GERD (gastroesophageal reflux disease)    Dementia    Rheumatoid arthritis    Paroxysmal atrial fibrillation      Plan:  1.  Continue by mouth diet per speech recommendations.  2.  Okay transfer to [facility] today.  3.  Amiodarone per cardiology.  4.  Continue hydralazine.  5.  Physical therapy.  6.  Occupational therapy  7.  Speech therapy.  8.  Continue  home prednisone 7.5 mg daily.   9. Add amlodipine.     Aquiles Silva MD  12/14/17  1:25 PM          *. Please note that portions of this note were completed with a voice recognition program. Efforts were made to edit the dictations, but occasionally words are mistranscribed.

## 2017-12-15 VITALS
SYSTOLIC BLOOD PRESSURE: 155 MMHG | BODY MASS INDEX: 27.51 KG/M2 | WEIGHT: 149.47 LBS | HEIGHT: 62 IN | HEART RATE: 72 BPM | RESPIRATION RATE: 16 BRPM | TEMPERATURE: 98.3 F | DIASTOLIC BLOOD PRESSURE: 81 MMHG | OXYGEN SATURATION: 97 %

## 2017-12-15 LAB
GLUCOSE BLDC GLUCOMTR-MCNC: 125 MG/DL (ref 70–130)
GLUCOSE BLDC GLUCOMTR-MCNC: 244 MG/DL (ref 70–130)

## 2017-12-15 PROCEDURE — 92526 ORAL FUNCTION THERAPY: CPT

## 2017-12-15 PROCEDURE — 97116 GAIT TRAINING THERAPY: CPT

## 2017-12-15 PROCEDURE — 82962 GLUCOSE BLOOD TEST: CPT

## 2017-12-15 PROCEDURE — 97530 THERAPEUTIC ACTIVITIES: CPT

## 2017-12-15 PROCEDURE — 63710000001 PREDNISONE PER 5 MG: Performed by: INTERNAL MEDICINE

## 2017-12-15 PROCEDURE — 99239 HOSP IP/OBS DSCHRG MGMT >30: CPT | Performed by: INTERNAL MEDICINE

## 2017-12-15 RX ADMIN — PREDNISONE 7.5 MG: 5 TABLET ORAL at 08:57

## 2017-12-15 RX ADMIN — AMIODARONE HYDROCHLORIDE 400 MG: 200 TABLET ORAL at 08:56

## 2017-12-15 RX ADMIN — AMLODIPINE BESYLATE 10 MG: 10 TABLET ORAL at 08:56

## 2017-12-15 RX ADMIN — FAMOTIDINE 20 MG: 20 TABLET, FILM COATED ORAL at 08:57

## 2017-12-15 RX ADMIN — METOPROLOL TARTRATE 12.5 MG: 25 TABLET, FILM COATED ORAL at 08:56

## 2017-12-15 NOTE — PROGRESS NOTES
Continued Stay Note  [facility]     Patient Name: Lois Brennan  MRN: [id number]  Today's Date: 12/15/2017    Admit Date: 12/9/2017          Discharge Plan       12/15/17 0821    Case Management/Social Work Plan    Plan [facility]    Additional Comments Waiting on final word on pre cert for [facility]. Called [facility].  Ambulance re-scheduled for 1600 today to transport her to [facility], pending approved from her insurance.                Discharge Codes     None        Expected Discharge Date and Time     Expected Discharge Date Expected Discharge Time    Dec 14, 2017             Nnamdi Hatch RN

## 2017-12-15 NOTE — PLAN OF CARE
Problem: Patient Care Overview (Adult)  Goal: Plan of Care Review  Outcome: Ongoing (interventions implemented as appropriate)    12/15/17 1045   Coping/Psychosocial Response Interventions   Plan Of Care Reviewed With patient;daughter   Patient Care Overview   Progress progress toward functional goals as expected   Outcome Evaluation   Outcome Summary/Follow up Plan Dysphagia tx: Participated well in excercises. Tolerating nectar-thick liquids and puree diet. Trialed nectar, thins, and pudding w/ no s/sxs aspiration. Pt unable to participate in cognitive-linguistic tx at this time 2' very labile. Handout left with swallowing exercises and word searches were in room for pt to complete. SLP will cont dys and cog-comm tx.         Problem: Inpatient SLP  Goal: Dysphagia- Patient will safely consume diet as per recommendation with no signs/symptoms of aspiration  Outcome: Ongoing (interventions implemented as appropriate)    12/12/17 1644 12/15/17 1045   Safely Consume Diet   Safely Consume Diet- SLP, Date Established 12/12/17 --    Safely Consume Diet- SLP, Time to Achieve by discharge --    Safely Consume Diet- SLP, Date Goal Reviewed --  12/15/17   Safely Consume Diet- SLP, Outcome --  goal ongoing

## 2017-12-15 NOTE — PROGRESS NOTES
Pulmonary/Critical Care Follow-up     LOS: 6 days   Patient Care Team:  No Known Provider as PCP - General        Chief Complaint   Patient presents with   • Stroke     Subjective    77-year-old woman with a history of diabetes, RA on chronic low dose prednisone, HTN who presented with left sided weakness, right frontal lobe hemorrhage with edema. She had a preceding episode of transient facial weakness earlier this week.  CT scan revealed a 3.7 x 3.8 cm frontal hemorrhage with some right to left shift and a smaller posterior and superior hemorrhage.    Patient had atrial fibrillation during her echocardiogram.      Interval History:  Patient continues to do well.  She did not work with physical therapy today.  She is tolerating her diet.  Blood pressure contol is improved.  Heart rate is stable after initiation of amiodarone.  Plan is to transfer the patient to [facility] today.  She was supposed to be transferred yesterday but insurance was pending.  No headache, nausea, or vomiting.    History taken from: Chart/patient    PMH/FH/Social History were reviewed and updated appropriately in the electronic medical record.     Review of Systems:    Review of 14 systems was completed with positives and pertinent negatives noted in the subjective section.  All other systems reviewed and are negative.   Exceptions are noted below:    Unable to obtain secondary to confusion      Objective     Vital Signs  Temp:  [98.1 °F (36.7 °C)-98.3 °F (36.8 °C)] 98.3 °F (36.8 °C)  Heart Rate:  [49-93] 76  Resp:  [16-18] 16  BP: (124-164)/() 139/74  12/14 0701 - 12/15 0700  In: 240 [P.O.:240]  Out: 900 [Urine:900]  Body mass index is 27.34 kg/(m^2).     Physical Exam:     Constitutional:    Alert, cooperative, in no acute distress   Head:    Normocephalic, left facial droop    Eyes:            Lids and lashes normal, conjunctivae and sclerae normal, no   icterus, no pallor, corneas clear, PER   ENMT:    Ears appear intact with no abnormalities noted      No oral lesions, no thrush, oral mucosa moist   Neck:   No adenopathy, supple, trachea midline, no thyromegaly, no JVD       Lungs/Resp:     Normal effort, symmetric chest rise, no crepitus, clear to      auscultation bilaterally, no chest wall tenderness                Heart/CV:    Tachycardic, irregularly irregular, normal S1 and S2, no            murmur   Abdomen/GI:     Normal bowel sounds, no masses, no organomegaly, soft,        non-tender, non-distended   :     Deferred   Extremities/MSK:   No clubbing or cyanosis.  No edema.  Normal tone.  No deformities.    Pulses:   Pulses palpable and equal bilaterally   Skin:   No bleeding, bruising or rash   Heme/Lymph:   No cervical or supraclavicular adenopathy.    Neurologic:    Psychiatric:       Left side weakness 2/5 LUE  4/5 LLE, left facial droop.      Normal affect.             Results Review:     I reviewed the patient's new clinical results.     Results from last 7 days  Lab Units 12/13/17 0450 12/11/17 0405 12/09/17  1539   SODIUM mmol/L 136 138 135   POTASSIUM mmol/L 4.3 4.0 3.7   CHLORIDE mmol/L 104 108 102   CO2 mmol/L 27.0 22.0 27.0   BUN mg/dL 28* 21 11   CREATININE mg/dL 1.20 1.00 1.00   CALCIUM mg/dL 8.7 8.4* 8.9   BILIRUBIN mg/dL  --   --  0.6   ALK PHOS U/L  --   --  75   ALT (SGPT) U/L  --   --  7   AST (SGOT) U/L  --   --  12   GLUCOSE mg/dL 180* 172* 166*       Results from last 7 days  Lab Units 12/13/17  0450 12/12/17  0434 12/11/17  0405   WBC 10*3/mm3 7.62 8.66 10.60   HEMOGLOBIN g/dL 12.2 10.9* 11.2*   HEMATOCRIT % 38.1 33.3* 34.8   PLATELETS 10*3/mm3 217 201 209           Results from last 7 days  Lab Units 12/13/17  0450 12/11/17  0405   MAGNESIUM mg/dL 2.2 2.0   PHOSPHORUS mg/dL 4.4 3.4       Results from last 7 days  Lab Units 12/10/17  0542   HEMOGLOBIN A1C % 7.00*       I reviewed the patient's new imaging including images and reports.    CT head 12/10/17:  IMPRESSION:  No  interval change is noted in the intra-axial and  extracerebral bleed complex described above. There is no evidence of  progressive hemorrhage, progressive edema or progressive mass effect.    CT head 12/9/17:  IMPRESSION:      Intra-axial cerebral bleed, right frontal parietal, with surrounding  edema and mass effect. Measurements and volumetric measurements are  offered above.      There is a 14 mm high attenuation structure adjacent to the posterior  right falx cerebri which could be a densely calcified meningioma or  second hemorrhage.      There is a small area of subarachnoid bleed along the right parietal  convexity in the sulcal GYRAL recesses.      There is considerable mass effect in the right hemisphere.    Echocardiogram 12/9/17:  · Left ventricular systolic function is normal. Estimated EF = 70%.  · Normal right ventricular cavity size, wall thickness, systolic function and septal motion noted.  · Saline test results are negative.  · The aortic valve exhibits sclerosis.  · No evidence of pulmonary hypertension is present.  · There is no evidence of pericardial effusion.  · Atrial fibrillation noted as primary rhythm.      Medication Review:     amiodarone 400 mg Oral Q12H   amLODIPine 10 mg Oral Q24H   famotidine 20 mg Oral Q12H   hydrALAZINE 25 mg Oral Q8H   insulin regular 0-9 Units Subcutaneous 4x Daily AC & at Bedtime   metoprolol tartrate 12.5 mg Oral Q12H   predniSONE 7.5 mg Oral Daily          Assessment/Plan     Principal Problem:    Intracranial hemorrhage  Active Problems:    Essential hypertension    Type 2 diabetes mellitus with complication, without long-term current use of insulin    Pharyngeal dysphagia    GERD (gastroesophageal reflux disease)    Dementia    Rheumatoid arthritis    Paroxysmal atrial fibrillation      Plan:  1.  Continue by mouth diet per speech recommendations.  2.  Okay transfer to [facility] today.  3.  Amiodarone per cardiology.  4.  Continue hydralazine.  5.   Physical therapy.  6.  Occupational therapy  7.  Speech therapy.  8.  Continue home prednisone 7.5 mg daily.   9. Continue amlodipine.     Aquiles Silva MD  12/15/17  4:03 PM      Time: Discharge 35 min    *. Please note that portions of this note were completed with a voice recognition program. Efforts were made to edit the dictations, but occasionally words are mistranscribed.

## 2017-12-15 NOTE — PLAN OF CARE
Problem: Patient Care Overview (Adult)  Goal: Plan of Care Review  Outcome: Ongoing (interventions implemented as appropriate)    12/15/17 0913   Coping/Psychosocial Response Interventions   Plan Of Care Reviewed With patient   Patient Care Overview   Progress improving   Outcome Evaluation   Outcome Summary/Follow up Plan Pt demonstrated increased indep with gait; progressed forward ambulation distance to 130 total ft with RWx and min x2 A. Cont to require verbal/tactile cues for improvements in balance and L-side coordination. Gait goal modified as previous goal achieved; will cont to progress as clinically warranted.          Problem: Stroke (Hemorrhagic) (Adult)  Goal: Signs and Symptoms of Listed Potential Problems Will be Absent or Manageable (Stroke)  Outcome: Outcome(s) achieved Date Met:  12/15/17    12/15/17 0913   Stroke (Hemorrhagic)   Problems Assessed (Stroke (Hemorrhagic)) cognitive impairment;motor/sensory impairment   Problems Present (Stroke (Hemorrhagic)) cognitive impairment;motor/sensory impairment         Problem: Inpatient Physical Therapy  Goal: Transfer Training Goal 1 LTG- PT  Outcome: Ongoing (interventions implemented as appropriate)    12/10/17 1021 12/15/17 0913   Transfer Training PT LTG   Transfer Training PT LTG, Date Established 12/10/17 --    Transfer Training PT LTG, Time to Achieve 2 wks --    Transfer Training PT LTG, Activity Type bed to chair /chair to bed;sit to stand/stand to sit --    Transfer Training PT LTG, [place] Level contact guard assist --    Transfer Training PT LTG, Outcome --  goal ongoing       Goal: Gait Training Goal LTG- PT  Outcome: Revised    12/10/17 1021 12/15/17 0913   Gait Training PT LTG   Gait Training Goal PT LTG, Date Established 12/10/17 --    Gait Training Goal PT LTG, Time to Achieve 2 wks --    Gait Training Goal PT LTG, [place] Level --  contact guard assist   Gait Training Goal PT LTG, Distance to Achieve --  200   Gait Training Goal  PT LTG, Date Goal Reviewed --  12/15/17   Gait Training Goal PT LTG, Outcome --  goal revised  (previous goal achieved today)       Goal: Static Sitting Balance Goal LTG- PT  Outcome: Ongoing (interventions implemented as appropriate)    12/10/17 1021 12/15/17 0913   Static Sitting Balance PT LTG   Static Sitting Balance PT LTG, Date Established 12/10/17 --    Static Sitting Balance PT LTG, Time to Achieve 2 wks --    Static Sitting Balance PT LTG, [place] Level supervision required --    Static Sitting Balance PT LTG, Assist Device UE Support --    Static Sitting Balance PT LTG, Outcome --  goal ongoing

## 2017-12-15 NOTE — PLAN OF CARE
Problem: Patient Care Overview (Adult)  Goal: Plan of Care Review  Outcome: Ongoing (interventions implemented as appropriate)    12/15/17 1309   Coping/Psychosocial Response Interventions   Plan Of Care Reviewed With patient   Patient Care Overview   Progress improving   Outcome Evaluation   Outcome Summary/Follow up Plan Pt demo improved activity tolerance and independence in this date by performing STS t/f and ambulating 135 ft w/ Min Ax2 and RW. Pt conts to require frequent VCs for attention to L side and noted increased tone in LUE. Recommend cont skilled IPOT POC.          Problem: Inpatient Occupational Therapy  Goal: Transfer Training Goal 1 LTG- OT  Outcome: Ongoing (interventions implemented as appropriate)    12/10/17 1026 12/15/17 1309   Transfer Training OT LTG   Transfer Training OT LTG, Date Established 12/10/17 --    Transfer Training OT LTG, Time to Achieve 1 wk --    Transfer Training OT LTG, Activity Type bed to chair /chair to bed;sit to stand/stand to sit;toilet --    Transfer Training OT LTG, [place] Level minimum assist (75% patient effort);2 person assist required --    Transfer Training OT LTG, Assist Device (AAD) --    Transfer Training OT LTG, Outcome --  goal ongoing       Goal: Strength Goal LTG- OT  Outcome: Ongoing (interventions implemented as appropriate)    12/10/17 1026 12/15/17 1309   Strength OT LTG   Strength Goal OT LTG, Date Established 12/10/17 --    Strength Goal OT LTG, Time to Achieve 1 wk --    Strength Goal OT LTG, Measure to Achieve (Increase LUE MMS by 1/2 MMG to support fxl I) --    Strength Goal OT LTG, Outcome --  goal ongoing       Goal: Grooming Goal LTG- OT  Outcome: Ongoing (interventions implemented as appropriate)    12/10/17 1026 12/15/17 1309   Grooming OT LTG   Grooming Goal OT LTG, Date Established 12/10/17 --    Grooming Goal OT LTG, Time to Achieve 1 wk --    Grooming Goal OT LTG, [place] Level minimum assist (75% patient effort);verbal cues  required;nonverbal cues required (demo/gesture) --    Grooming Goal OT LTG, Position sitting in chair --    Grooming Goal OT LTG, Outcome --  goal ongoing       Goal: Toileting Goal LTG- OT  Outcome: Ongoing (interventions implemented as appropriate)    12/10/17 1026 12/15/17 1309   Toileting OT LTG   Toileting Goal OT LTG, Date Established 12/10/17 --    Toileting Goal OT LTG, Time to Achieve 1 wk --    Toileting Goal OT LTG, [place] Level maximum assist (25% patient effort) --    Toileting Goal OT LTG, Outcome --  goal ongoing

## 2017-12-15 NOTE — THERAPY TREATMENT NOTE
Acute Care - Speech Language Pathology   Swallow Progress Note [facility]     Patient Name: Lois Brennan  : 1940  MRN: [id number]  Today's Date: 12/15/2017  Onset of Illness/Injury or Date of Surgery Date: 17            Admit Date: 2017    Visit Dx:      ICD-10-CM ICD-9-CM   1. Intracranial hematoma, right, without loss of consciousness, initial encounter S06.340A 853.01   2. Impaired mobility and ADLs Z74.09 799.89   3. Impaired functional mobility, balance, gait, and endurance Z74.09 V49.89     Patient Active Problem List   Diagnosis   • Intracranial hemorrhage   • Essential hypertension   • Type 2 diabetes mellitus with complication, without long-term current use of insulin   • Pharyngeal dysphagia   • GERD (gastroesophageal reflux disease)   • Dementia   • Rheumatoid arthritis   • Paroxysmal atrial fibrillation             Adult Rehabilitation Note       12/15/17 1000 12/15/17 0819 17 0925    Rehab Assessment/Intervention    Discipline speech language pathologist  -VW physical therapist  -LS physical therapist  -LM    Document Type therapy note (daily note)  -VW therapy note (daily note)  -LS therapy note (daily note)  -LM    Subjective Information no complaints;agree to therapy  -VW agree to therapy;no complaints  -LS agree to therapy;no complaints  -LM    Patient Effort, Rehab Treatment excellent  -VW excellent  -LS good  -LM    Symptoms Noted During/After Treatment   significant change in vital signs   HR would jump around from 150's-180's with amb  -LM    Symptoms Noted Comment  Noted highest HR during gait at 145 bpm. Decreased with standing rest break.   -LS RN notified  -LM    Precautions/Limitations  fall precautions;oxygen therapy device and L/min;cardiac precautions   monitor HR  -LS fall precautions;other (see comments)   Monitor HR closely  -LM    Recorded by [VW] Geneva Boone MA, CFY-SLP [LS] Keely Bryan, PT [LM] Eugenia Valerio, PT    Vital Signs    Pre Systolic BP  Rehab  146  -  -LM    Pre Treatment Diastolic BP  73  -LS 67  -LM    Post Systolic BP Rehab  156  -  -LM    Post Treatment Diastolic BP  60  -LS 75  -LM    Pretreatment Heart Rate (beats/min)  80  -LS 98  -LM    Intratreatment Heart Rate (beats/min)   --   150's-180's w/ 60 feet of amb  -LM    Posttreatment Heart Rate (beats/min)  76  -  -LM    Pre SpO2 (%)  96  -LS 96  -LM    O2 Delivery Pre Treatment  room air  -LS room air  -LM    Post SpO2 (%)  98  -LS 97  -LM    O2 Delivery Post Treatment  room air  -LS room air  -LM    Pre Patient Position  Supine  -LS Supine  -LM    Intra Patient Position  Standing  -LS Standing  -LM    Post Patient Position  Sitting  -LS Sitting  -LM    Recorded by  [LS] Keely Bryan, PT [LM] Eugenia Valerio, MAC    Pain Assessment    Pain Assessment No/denies pain  -VW 0-10  -LS 0-10  -LM    Pain Score 0  -VW 0  -LS 0  -LM    Post Pain Score 0  -VW 0  -LS 0  -LM    Recorded by [VW] Geneva Boone MA, CFY-SLP [LS] Keely Bryan, PT [LM] Eugenia Valerio PT    Cognitive Assessment/Intervention    Current Cognitive/Communication Assessment  impaired  -LS impaired  -LM    Orientation Status  oriented to;person;place;required verbal cueing (specifiy in comments)   cues for month  -LS oriented to;person;time;disoriented to;place  -LM    Follows Commands/Answers Questions  able to follow single-step instructions;100% of the time;needs cueing;needs increased time;needs repetition  -% of the time;able to follow single-step instructions;needs cueing;needs increased time  -LM    Personal Safety  mild impairment;decreased awareness, need for safety;decreased awareness, need for assist;decreased insight to deficits  -LS moderate impairment;decreased awareness, need for safety  -LM    Personal Safety Interventions  elopement precautions initiated;fall prevention program maintained;nonskid shoes/slippers when out of bed  -LS fall prevention program maintained;gait belt;muscle  strengthening facilitated;nonskid shoes/slippers when out of bed  -LM    Recorded by  [LS] Keely Bryan, PT [LM] Eugenia Valerio, PT    Improve oral skills    To Improve Oral Skills, patient will: Increase tongue A-P movement;100%;with inconsistent cues  -VW      Status: Improve Oral Skills Progressing as expected  -VW      Oral Skills Progress 60%;with inconsistent cues;continue to adress  -VW      Recorded by [VW] Geneva Boone MA, CFY-SLP      Improve timing of pharyngeal response    To improve timing of pharyngeal response, patient will: Swallow in timely way using suck-swallow response;Swallow in timely way using three second prep;Swallow in timely way using super-supraglottic swallow;80%;with consistent cues  -VW      Status: Improve timing of pharyngeal response Progressing as expected  -VW      Timing of Pharyngeal Response Progress 50%;with inconsistent cues;continue to adress  -VW      Comments: Improve timing of pharyngeal response super-supraglottic  -VW      Recorded by [VW] Geneva Boone MA, CFY-SLP      Improve laryngeal closure    To improve laryngeal closure, patient will: Complete supraglottic swallow;Complete Valsalva/breath hold;80%;with consistent cues  -VW      Status: Improve laryngeal closure Progressing as expected  -VW      Laryngeal Closure Progress 50%;with inconsistent cues;continue to adress  -VW      Recorded by [VW] Geneva Boone MA, CFY-SLP      Improve laryngeal elevation    To improve laryngeal elevation, patient will: Complete super-supraglottic swallow;80%;with consistent cues  -VW      Status: Improve laryngeal elevation Progressing as expected  -VW      Laryngeal Elevation Progress 50%;with inconsistent cues;continue to adress  -VW      Recorded by [VW] Geneva Boone MA, CFY-SLP      Improve tongue base & pharyngeal wall squeeze    To improve tongue base & pharyngeal wall squeeze, patient will: Complete effortful swallow;80%;with consistent cues  -VW       Status: Improve tongue base & pharyngeal wall squeeze Progressing as expected  -VW      Tongue Base/Pharyngeal Wall Squeeze Progress 60%;with inconsistent cues;continue to adress  -VW      Recorded by [VW] Geneva Boone MA, CFY-SLP      Bed Mobility, Assessment/Treatment    Bed Mobility, Assistive Device  head of bed elevated;draw sheet  -LS bed rails;head of bed elevated;draw sheet  -LM    Bed Mobility, Scoot/Bridge, [place]   maximum assist (25% patient effort);verbal cues required;nonverbal cues required (demo/gesture)  -LM    Bed Mob, Supine to Sit, [place]  moderate assist (50% patient effort);verbal cues required  -LS moderate assist (50% patient effort);verbal cues required  -LM    Bed Mob, Sit to Supine, [place]   not tested  -LM    Recorded by  [LS] Keely Bryan, PT [LM] Eugenia Valerio, MAC    Transfer Assessment/Treatment    Transfers, Sit-Stand [place]  minimum assist (75% patient effort);2 person assist required;verbal cues required  -LS minimum assist (75% patient effort);verbal cues required;nonverbal cues required (demo/gesture)  -LM    Transfers, Stand-Sit [place]  minimum assist (75% patient effort);2 person assist required;verbal cues required  -LS minimum assist (75% patient effort);verbal cues required;nonverbal cues required (demo/gesture)  -LM    Transfers, Sit-Stand-Sit, Assist Device  rolling walker  -LS rolling walker   Assist for L hand placement  -LM    Transfer, Safety Issues   step length decreased;balance decreased during turns;sequencing ability decreased  -LM    Transfer, Impairments   strength decreased;impaired balance  -LM    Transfer, Comment  VC's for hand placement and achieving upright posture.   -LS     Recorded by  [LS] Keely Bryan, PT [LM] Eugenia Valerio, MAC    Gait Assessment/Treatment    Gait, [place] Level  minimum assist (75% patient effort);2 person assist required  -LS moderate assist (50% patient effort);1 person + 1 person to manage  equipment  -LM    Gait, Assistive Device  rolling walker  -LS rolling walker  -LM    Gait, Distance (Feet)  130  -LS 60  -LM    Gait, Gait Deviations  bhupinder decreased;step length decreased  -LS bhupinder decreased;narrow base  -LM    Gait, Safety Issues   step length decreased;balance decreased during turns  -LM    Gait, Impairments  strength decreased;impaired balance  -LS strength decreased;impaired balance  -LM    Gait, Comment  Req'd occasional assist for maintaining appropriate  L on RW and for negotiation of turns. VC's for increasing step length and width.   -LS Requires assist to maintain LUE on walker;  Pt leans posteriorly and to the left  -LM    Recorded by  [LS] Keely Bryan, PT [LM] Eugenia Valerio, MAC    Stairs Assessment/Treatment    Stairs, [place] Level   not tested  -LM    Recorded by   [LM] Eugenia Valerio PT    Motor Skills/Interventions    Additional Documentation  Balance Skills Training (Group)  -LS     Recorded by  [LS] Keely Bryan, PT     Balance Skills Training    Sitting-Level of Assistance  Contact guard   leans to L; vc's for midline  -LS Contact guard  -LM    Sitting-Balance Support  Feet supported  -LS Feet supported;Right upper extremity supported;Left upper extremity supported  -LM    Sitting-Balance Activities   Trunk control activities  -LM    Standing-Level of Assistance  Minimum assistance;x2  -LS Moderate assistance  -LM    Static Standing Balance Support  assistive device  -LS assistive device  -LM    Standing-Balance Activities   Weight Shift A-P  -LM    Gait Balance-Level of Assistance  Minimum assistance;x2  -LS Moderate assistance  -LM    Gait Balance Support  assistive device  -LS assistive device  -LM    Recorded by  [LS] Keely Bryan, PT [LM] Eugenia Valerio, PT    Therapy Exercises    Bilateral Lower Extremities  AROM:;10 reps;sitting;ankle pumps/circles;LAQ;hip flexion   alternating LAQ and hip flex for coordination  -LS AROM:;15 reps;sitting;ankle  pumps/circles;glut sets;hip abduction/adduction;hip flexion;LAQ  -LM    Recorded by  [LS] Keely Bryan, PT [LM] Eugenia Valerio, PT    Positioning and Restraints    Pre-Treatment Position  in bed  -LS in bed  -LM    Post Treatment Position  chair  -LS chair  -LM    In Chair  notified nsg;reclined;call light within reach;encouraged to call for assist;exit alarm on;RUE elevated;LUE elevated;legs elevated;heels elevated  -LS reclined;call light within reach;encouraged to call for assist;exit alarm on;notified nsg  -LM    Recorded by  [LS] Keely Bryan, PT [LM] Eugenia Valerio, PT      12/12/17 1100          Rehab Assessment/Intervention    Discipline speech language pathologist  -      Document Type re-evaluation;therapy note (daily note)  -SM      Recorded by [SM] Violet Hidalgo MS CCC-SLP      Cognitive Assessment/Intervention    Additional Documentation Cognitive Assessment Intervention (Group)  -SM      Recorded by [] Violet Hidalgo MS CCC-SLP      Cognitive Assessment Intervention    Pragmatics flat affect;initiation problems;topic maintenance problems;poor eye contact  -      Organization other (see comments)   successfully completed word search puzzle  -SM      Recorded by [] Violet Hidalgo MS CCC-SLP      Improve articulation    Improve articulation: by over-articulating at word level;by over-articulating at phrase level  -SM      Status: Improve articulation Progressing as expected  -SM      Articulation Progress 70%;with inconsistent cues  -SM      Recorded by [] Violet Hidalgo MS CCC-SLP        User Key  (r) = Recorded By, (t) = Taken By, (c) = Cosigned By    Initials Name Effective Dates     Violet Hidalgo MS CCC-SLP 06/22/15 -     LS Keely Bryan, PT 06/19/15 -     LM Eugenia Valerio, PT 06/15/16 -     VW Geneva Boone MA, CFY-SLP 07/13/17 -                   IP SLP Goals       12/15/17 1045 12/12/17 1644 12/11/17 1008    Safely Consume Diet    Safely Consume Diet-  SLP, Date Established  12/12/17  -SM     Safely Consume Diet- SLP, Time to Achieve  by discharge  -SM     Safely Consume Diet- SLP, Date Goal Reviewed 12/15/17  -VW      Safely Consume Diet- SLP, Outcome goal ongoing  -VW goal ongoing  -SM     Begin to Take Some PO Safely    Begin to Take Some PO Safely- SLP, Date Established   12/11/17  -LS    Begin to Take Some PO Safely- SLP, Time to Achieve   by discharge  -LS    Begin to Take Some PO Safely- SLP, Date Goal Reviewed   12/11/17  -LS    Begin to Take Some PO Safely- SLP, Outcome  goal met  -SM goal ongoing  -LS    Cognitive Linguistic- Optimal Participation in Care    Cognitive Linguistic Optimal Participation in Care- SLP, Date Established  12/11/17  -SM     Cognitive Linguistic Optimal Participation in Care- SLP, Time to Achieve  by discharge  -SM     Cognitive Linguistic Optimal Participation in Care- SLP, Outcome  goal ongoing  -SM       User Key  (r) = Recorded By, (t) = Taken By, (c) = Cosigned By    Initials Name Provider Type    BHAVYA Hidalgo, MS CCC-SLP Speech and Language Pathologist    NASRIN Conklin, MS CCC-SLP Speech and Language Pathologist     Geneva Boone MA, CFY-SLP Speech and Language Pathologist          EDUCATION  The patient has been educated in the following areas:   Communication Impairment Dysphagia (Swallowing Impairment) Oral Care/Hydration.    SLP Recommendation and Plan    Plan of Care Review  Plan Of Care Reviewed With: patient, daughter  Progress: progress toward functional goals as expected  Outcome Summary/Follow up Plan: Dysphagia tx: Participated well in excercises. Tolerating nectar-thick liquids and puree diet. Trialed nectar, thins, and pudding w/ no s/sxs aspiration. Pt unable to participate in cognitive-linguistic tx at this time 2' very labile. Handout left with swallowing exercises and word searches were in room for pt to complete. SLP will cont dys and cog-comm tx.           Time Calculation:          Time Calculation- SLP       12/15/17 1048          Time Calculation- SLP    SLP Start Time 1000  -VW      SLP Received On 12/15/17  -        User Key  (r) = Recorded By, (t) = Taken By, (c) = Cosigned By    Initials Name Provider Type     Geneva Boone MA, CFY-SLP Speech and Language Pathologist          Therapy Charges for Today     Code Description Service Date Service Provider Modifiers Qty    [id number] HC ST TREATMENT SWALLOW 3 12/15/2017 Geneva Boone MA, CFY-SLP GN 1                 Geneva Boone MA, CFY-SLP  12/15/2017

## 2017-12-15 NOTE — THERAPY TREATMENT NOTE
Acute Care - Occupational Therapy Treatment Note  [facility]     Patient Name: Lois Brennan  : 1940  MRN: [id number]  Today's Date: 12/15/2017  Onset of Illness/Injury or Date of Surgery Date: 17  Date of Referral to OT: 17  Referring Physician: Dr. Espitia      Admit Date: 2017    Visit Dx:     ICD-10-CM ICD-9-CM   1. Intracranial hematoma, right, without loss of consciousness, initial encounter S06.340A 853.01   2. Impaired mobility and ADLs Z74.09 799.89   3. Impaired functional mobility, balance, gait, and endurance Z74.09 V49.89     Patient Active Problem List   Diagnosis   • Intracranial hemorrhage   • Essential hypertension   • Type 2 diabetes mellitus with complication, without long-term current use of insulin   • Pharyngeal dysphagia   • GERD (gastroesophageal reflux disease)   • Dementia   • Rheumatoid arthritis   • Paroxysmal atrial fibrillation             Adult Rehabilitation Note       12/15/17 1000 12/15/17 0819 12/15/17 0818    Rehab Assessment/Intervention    Discipline speech language pathologist  -VW physical therapist  -LS occupational therapist  -CL    Document Type therapy note (daily note)  -VW therapy note (daily note)  -LS therapy note (daily note)  -CL    Subjective Information no complaints;agree to therapy  -VW agree to therapy;no complaints  -LS agree to therapy;no complaints  -CL    Patient Effort, Rehab Treatment excellent  -VW excellent  -LS excellent  -CL    Symptoms Noted During/After Treatment   significant change in vital signs  -CL    Symptoms Noted Comment  Noted highest HR during gait at 145 bpm. Decreased with standing rest break.   -LS Noted increase in HR.   -CL    Precautions/Limitations  fall precautions;oxygen therapy device and L/min;cardiac precautions   monitor HR  -LS fall precautions;oxygen therapy device and L/min;other (see comments)   L sided weakness/inattention, monitor HR  -CL    Recorded by [VW] Geneva Boone MA, CFY-SLP  [LS] Keely Bryan, PT [CL] Martine Silverio OT    Vital Signs    Pre Systolic BP Rehab  146  -  -CL    Pre Treatment Diastolic BP  73  -LS 73  -CL    Post Systolic BP Rehab  156  -  -CL    Post Treatment Diastolic BP  60  -LS 60  -CL    Pretreatment Heart Rate (beats/min)  80  -LS 80  -CL    Intratreatment Heart Rate (beats/min)   145  -CL    Posttreatment Heart Rate (beats/min)  76  -LS 79  -CL    Pre SpO2 (%)  96  -LS 96  -CL    O2 Delivery Pre Treatment  room air  -LS room air  -CL    Post SpO2 (%)  98  -LS 98  -CL    O2 Delivery Post Treatment  room air  -LS room air  -CL    Pre Patient Position  Supine  -LS Supine  -CL    Intra Patient Position  Standing  -LS Standing  -CL    Post Patient Position  Sitting  -LS Sitting  -CL    Recorded by  [LS] Keely Bryan, PT [CL] Martine Silverio OT    Pain Assessment    Pain Assessment No/denies pain  -VW 0-10  -LS No/denies pain  -CL    Pain Score 0  -VW 0  -LS     Post Pain Score 0  -VW 0  -LS     Recorded by [VW] Geneva Boone MA, CFY-SLP [LS] Keely Bryan, PT [CL] Martine Silverio OT    Cognitive Assessment/Intervention    Current Cognitive/Communication Assessment  impaired  -LS impaired  -CL    Orientation Status  oriented to;person;place;required verbal cueing (specifiy in comments)   cues for month  -LS oriented to;person;place;required verbal cueing (specifiy in comments);time  -CL    Follows Commands/Answers Questions  able to follow single-step instructions;100% of the time;needs cueing;needs increased time;needs repetition  -LS 75% of the time;100% of the time;needs cueing;needs increased time;needs repetition  -CL    Personal Safety  mild impairment;decreased awareness, need for safety;decreased awareness, need for assist;decreased insight to deficits  -LS mild impairment;decreased awareness, need for assist;decreased awareness, need for safety  -CL    Personal Safety Interventions  elopement precautions initiated;fall prevention program  maintained;nonskid shoes/slippers when out of bed  -LS fall prevention program maintained;gait belt;nonskid shoes/slippers when out of bed  -CL    Recorded by  [LS] Keely Bryan, PT [CL] Martine Silverio OT    Improve oral skills    To Improve Oral Skills, patient will: Increase tongue A-P movement;100%;with inconsistent cues  -VW      Status: Improve Oral Skills Progressing as expected  -VW      Oral Skills Progress 60%;with inconsistent cues;continue to adress  -VW      Recorded by [VW] Geneva Boone MA, CFY-SLP      Improve timing of pharyngeal response    To improve timing of pharyngeal response, patient will: Swallow in timely way using suck-swallow response;Swallow in timely way using three second prep;Swallow in timely way using super-supraglottic swallow;80%;with consistent cues  -VW      Status: Improve timing of pharyngeal response Progressing as expected  -VW      Timing of Pharyngeal Response Progress 50%;with inconsistent cues;continue to adress  -VW      Comments: Improve timing of pharyngeal response super-supraglottic  -VW      Recorded by [VW] Geneva Boone MA, CFY-SLP      Improve laryngeal closure    To improve laryngeal closure, patient will: Complete supraglottic swallow;Complete Valsalva/breath hold;80%;with consistent cues  -VW      Status: Improve laryngeal closure Progressing as expected  -VW      Laryngeal Closure Progress 50%;with inconsistent cues;continue to adress  -VW      Recorded by [VW] Geneva Boone MA, CFY-SLP      Improve laryngeal elevation    To improve laryngeal elevation, patient will: Complete super-supraglottic swallow;80%;with consistent cues  -VW      Status: Improve laryngeal elevation Progressing as expected  -VW      Laryngeal Elevation Progress 50%;with inconsistent cues;continue to adress  -VW      Recorded by [VW] Geneva Boone MA, CFY-SLP      Improve tongue base & pharyngeal wall squeeze    To improve tongue base & pharyngeal wall squeeze,  patient will: Complete effortful swallow;80%;with consistent cues  -VW      Status: Improve tongue base & pharyngeal wall squeeze Progressing as expected  -VW      Tongue Base/Pharyngeal Wall Squeeze Progress 60%;with inconsistent cues;continue to adress  -VW      Recorded by [VW] Geneva Boone MA, CFY-SLP      Bed Mobility, Assessment/Treatment    Bed Mobility, Assistive Device  head of bed elevated;draw sheet  -LS bed rails;head of bed elevated  -CL    Bed Mob, Supine to Sit, [place]  moderate assist (50% patient effort);verbal cues required  -LS moderate assist (50% patient effort);verbal cues required  -CL    Bed Mobility, Comment   VCs for HP/sequencing.   -CL    Recorded by  [LS] Keely Bryan, PT [CL] Martine Silverio OT    Transfer Assessment/Treatment    Transfers, Sit-Stand [place]  minimum assist (75% patient effort);2 person assist required;verbal cues required  -LS minimum assist (75% patient effort);2 person assist required;verbal cues required  -CL    Transfers, Stand-Sit [place]  minimum assist (75% patient effort);2 person assist required;verbal cues required  -LS minimum assist (75% patient effort);2 person assist required;verbal cues required  -CL    Transfers, Sit-Stand-Sit, Assist Device  rolling walker  -LS rolling walker  -CL    Transfer, Comment  VC's for hand placement and achieving upright posture.   -LS VCs for HP, pt requires assist to place L hand.   -CL    Recorded by  [LS] Keely Bryan, PT [CL] Martine Silverio OT    Gait Assessment/Treatment    Gait, [place] Level  minimum assist (75% patient effort);2 person assist required  -LS     Gait, Assistive Device  rolling walker  -LS     Gait, Distance (Feet)  130  -LS     Gait, Gait Deviations  bhupinder decreased;step length decreased  -LS     Gait, Impairments  strength decreased;impaired balance  -LS     Gait, Comment  Req'd occasional assist for maintaining appropriate  L on RW and for negotiation of turns. VC's for  increasing step length and width.   -LS     Recorded by  [LS] Keely Bryan, PT     Functional Mobility    Functional Mobility- Ind. Level   minimum assist (75% patient effort);2 person assist required;verbal cues required  -CL    Functional Mobility- Device   rolling walker  -CL    Functional Mobility-Distance (Feet)   130  -CL    Functional Mobility- Comment   VCs for HP/sequencing.   -CL    Recorded by   [CL] Martine Silverio OT    Motor Skills/Interventions    Additional Documentation  Balance Skills Training (Group)  -LS Balance Skills Training (Group)  -CL    Recorded by  [LS] Keely Bryan, PT [CL] Martine Silverio OT    Balance Skills Training    Sitting-Level of Assistance  Contact guard   leans to L; vc's for midline  -LS Contact guard  -CL    Sitting-Balance Support  Feet supported  -LS Feet supported  -CL    Standing-Level of Assistance  Minimum assistance;x2  -LS Minimum assistance;x2  -CL    Static Standing Balance Support  assistive device  -LS assistive device  -CL    Standing-Balance Activities   Weight Shift R-L;Weight Shift A-P  -CL    Gait Balance-Level of Assistance  Minimum assistance;x2  -LS Minimum assistance;x2  -CL    Gait Balance Support  assistive device  -LS assistive device  -CL    Gait Balance Activities   side-stepping  -CL    Recorded by  [LS] Keely Bryan, PT [CL] Martine Silverio OT    Therapy Exercises    Bilateral Lower Extremities  AROM:;10 reps;sitting;ankle pumps/circles;LAQ;hip flexion   alternating LAQ and hip flex for coordination  -LS     Right Upper Extremity   AROM:;10 reps;elbow flexion/extension;hand pumps;shoulder ER/IR  -CL    Left Upper Extremity   AAROM:;PROM:;10 reps;elbow flexion/extension;hand pumps;pronation/supination;shoulder extension/flexion;shoulder ER/IR   wrist F/E  -CL    Recorded by  [LS] Keely Bryan, PT [CL] Martine Silverio OT    Positioning and Restraints    Pre-Treatment Position  in bed  -LS in bed  -CL    Post Treatment Position  chair  -LS chair  -CL    In  Chair  notified nsg;reclined;call light within reach;encouraged to call for assist;exit alarm on;RUE elevated;LUE elevated;legs elevated;heels elevated  -LS notified nsg;reclined;call light within reach;encouraged to call for assist;exit alarm on;RUE elevated;LUE elevated;waffle cushion;legs elevated;heels elevated  -CL    Recorded by  [LS] Keely Bryan, PT [CL] Martine Silverio OT      12/13/17 0925          Rehab Assessment/Intervention    Discipline physical therapist  -LM      Document Type therapy note (daily note)  -LM      Subjective Information agree to therapy;no complaints  -LM      Patient Effort, Rehab Treatment good  -LM      Symptoms Noted During/After Treatment significant change in vital signs   HR would jump around from 150's-180's with amb  -LM      Symptoms Noted Comment RN notified  -LM      Precautions/Limitations fall precautions;other (see comments)   Monitor HR closely  -LM      Recorded by [LM] Eugenia Valerio, PT      Vital Signs    Pre Systolic BP Rehab 171  -LM      Pre Treatment Diastolic BP 67  -LM      Post Systolic BP Rehab 155  -LM      Post Treatment Diastolic BP 75  -LM      Pretreatment Heart Rate (beats/min) 98  -LM      Intratreatment Heart Rate (beats/min) --   150's-180's w/ 60 feet of amb  -LM      Posttreatment Heart Rate (beats/min) 102  -LM      Pre SpO2 (%) 96  -LM      O2 Delivery Pre Treatment room air  -LM      Post SpO2 (%) 97  -LM      O2 Delivery Post Treatment room air  -LM      Pre Patient Position Supine  -LM      Intra Patient Position Standing  -LM      Post Patient Position Sitting  -LM      Recorded by [LM] Eugenia Valerio, PT      Pain Assessment    Pain Assessment 0-10  -LM      Pain Score 0  -LM      Post Pain Score 0  -LM      Recorded by [LM] Eugenia Valerio, PT      Cognitive Assessment/Intervention    Current Cognitive/Communication Assessment impaired  -LM      Orientation Status oriented to;person;time;disoriented to;place  -LM      Follows Commands/Answers  Questions 100% of the time;able to follow single-step instructions;needs cueing;needs increased time  -LM      Personal Safety moderate impairment;decreased awareness, need for safety  -LM      Personal Safety Interventions fall prevention program maintained;gait belt;muscle strengthening facilitated;nonskid shoes/slippers when out of bed  -LM      Recorded by [LM] Eugenia Valerio, PT      Bed Mobility, Assessment/Treatment    Bed Mobility, Assistive Device bed rails;head of bed elevated;draw sheet  -LM      Bed Mobility, Scoot/Bridge, [place] maximum assist (25% patient effort);verbal cues required;nonverbal cues required (demo/gesture)  -LM      Bed Mob, Supine to Sit, [place] moderate assist (50% patient effort);verbal cues required  -LM      Bed Mob, Sit to Supine, [place] not tested  -LM      Recorded by [LM] Eugenia Valerio, PT      Transfer Assessment/Treatment    Transfers, Sit-Stand [place] minimum assist (75% patient effort);verbal cues required;nonverbal cues required (demo/gesture)  -LM      Transfers, Stand-Sit [place] minimum assist (75% patient effort);verbal cues required;nonverbal cues required (demo/gesture)  -LM      Transfers, Sit-Stand-Sit, Assist Device rolling walker   Assist for L hand placement  -LM      Transfer, Safety Issues step length decreased;balance decreased during turns;sequencing ability decreased  -LM      Transfer, Impairments strength decreased;impaired balance  -LM      Recorded by [LM] Eugenia Valerio, PT      Gait Assessment/Treatment    Gait, [place] Level moderate assist (50% patient effort);1 person + 1 person to manage equipment  -LM      Gait, Assistive Device rolling walker  -LM      Gait, Distance (Feet) 60  -LM      Gait, Gait Deviations bhupinder decreased;narrow base  -LM      Gait, Safety Issues step length decreased;balance decreased during turns  -LM      Gait, Impairments strength decreased;impaired balance  -LM      Gait, Comment Requires  assist to maintain LUE on walker;  Pt leans posteriorly and to the left  -LM      Recorded by [LM] Eugenia Valerio PT      Stairs Assessment/Treatment    Stairs, [place] Level not tested  -LM      Recorded by [LM] Eugenia Valerio PT      Balance Skills Training    Sitting-Level of Assistance Contact guard  -LM      Sitting-Balance Support Feet supported;Right upper extremity supported;Left upper extremity supported  -LM      Sitting-Balance Activities Trunk control activities  -LM      Standing-Level of Assistance Moderate assistance  -LM      Static Standing Balance Support assistive device  -LM      Standing-Balance Activities Weight Shift A-P  -LM      Gait Balance-Level of Assistance Moderate assistance  -LM      Gait Balance Support assistive device  -LM      Recorded by [LM] Eugenia Valerio PT      Therapy Exercises    Bilateral Lower Extremities AROM:;15 reps;sitting;ankle pumps/circles;glut sets;hip abduction/adduction;hip flexion;LAQ  -LM      Recorded by [LM] Eugenia Valerio PT      Positioning and Restraints    Pre-Treatment Position in bed  -LM      Post Treatment Position chair  -LM      In Chair reclined;call light within reach;encouraged to call for assist;exit alarm on;notified nsg  -LM      Recorded by [LM] Eugenia Valerio PT        User Key  (r) = Recorded By, (t) = Taken By, (c) = Cosigned By    Initials Name Effective Dates    LS Keely Bryan, PT 06/19/15 -     LM Eugenia Valerio, PT 06/15/16 -     CL Martine Silverio, OT 06/08/16 -     VW Geneva Boone MA, CFY-SLP 07/13/17 -                 OT Goals       12/15/17 1309 12/12/17 1316 12/10/17 1026    Transfer Training OT LTG    Transfer Training OT LTG, Date Established   12/10/17  -TA    Transfer Training OT LTG, Time to Achieve   1 wk  -TA    Transfer Training OT LTG, Activity Type   bed to chair /chair to bed;sit to stand/stand to sit;toilet  -TA    Transfer Training OT LTG, [place] Level   minimum assist (75% patient effort);2 person assist  required  -TA    Transfer Training OT LTG, Assist Device   --   AAD  -TA    Transfer Training OT LTG, Outcome goal ongoing  -CL goal ongoing  -CL goal ongoing  -TA    Strength OT LTG    Strength Goal OT LTG, Date Established   12/10/17  -TA    Strength Goal OT LTG, Time to Achieve   1 wk  -TA    Strength Goal OT LTG, Measure to Achieve   --   Increase LUE MMS by 1/2 MMG to support fxl I  -TA    Strength Goal OT LTG, Outcome goal ongoing  -CL goal ongoing  -CL goal ongoing  -TA    Grooming OT LTG    Grooming Goal OT LTG, Date Established   12/10/17  -TA    Grooming Goal OT LTG, Time to Achieve   1 wk  -TA    Grooming Goal OT LTG, [place] Level   minimum assist (75% patient effort);verbal cues required;nonverbal cues required (demo/gesture)  -TA    Grooming Goal OT LTG, Position   sitting in chair  -TA    Grooming Goal OT LTG, Outcome goal ongoing  -CL goal ongoing  -CL goal ongoing  -TA    Toileting OT LTG    Toileting Goal OT LTG, Date Established   12/10/17  -TA    Toileting Goal OT LTG, Time to Achieve   1 wk  -TA    Toileting Goal OT LTG, [place] Level   maximum assist (25% patient effort)  -TA    Toileting Goal OT LTG, Outcome goal ongoing  -CL goal ongoing  -CL goal ongoing  -TA      User Key  (r) = Recorded By, (t) = Taken By, (c) = Cosigned By    Initials Name Provider Type    BECCA Ramos, OT Occupational Therapist    CL Martine Silverio, OT Occupational Therapist          Occupational Therapy Education     Title: PT OT SLP Therapies (Active)     Topic: Occupational Therapy (Active)     Point: ADL training (Active)    Description: Instruct learner(s) on proper safety adaptation and remediation techniques during self care or transfers.   Instruct in proper use of assistive devices.    Learning Progress Summary    Learner Readiness Method Response Comment Documented by Status   Patient Acceptance D,E NR Pt educated on appropriate safety precautions, t/f techniques, HEP, positioning, and benefits  of rehab.  12/15/17 1308 Active    Acceptance E VU,NR   12/13/17 1745 Done    Acceptance E VU,NR   12/12/17 2007 Done    Acceptance E,D NR Pt educated on appropriate safety precautions, t/f techniques, HEP, and benefits of therapy.  12/12/17 1315 Active    Acceptance E NR Role of OT; reinforced need for call for assist with OOB activities. TA 12/10/17 1024 Active               Point: Home exercise program (Active)    Description: Instruct learner(s) on appropriate technique for monitoring, assisting and/or progressing therapeutic exercises/activities.    Learning Progress Summary    Learner Readiness Method Response Comment Documented by Status   Patient Acceptance D,E NR Pt educated on appropriate safety precautions, t/f techniques, HEP, positioning, and benefits of rehab.  12/15/17 1308 Active    Acceptance E VU,NR   12/13/17 1745 Done    Acceptance E VU,NR   12/12/17 2007 Done    Acceptance E,D NR Pt educated on appropriate safety precautions, t/f techniques, HEP, and benefits of therapy.  12/12/17 1315 Active               Point: Precautions (Active)    Description: Instruct learner(s) on prescribed precautions during self-care and functional transfers.    Learning Progress Summary    Learner Readiness Method Response Comment Documented by Status   Patient Acceptance D,E NR Pt educated on appropriate safety precautions, t/f techniques, HEP, positioning, and benefits of rehab.  12/15/17 1308 Active    Acceptance E VU,NR   12/13/17 1745 Done    Acceptance E VU,NR   12/12/17 2007 Done    Acceptance E,D NR Pt educated on appropriate safety precautions, t/f techniques, HEP, and benefits of therapy.  12/12/17 1315 Active    Acceptance E NR Role of OT; reinforced need for call for assist with OOB activities. TA 12/10/17 1024 Active               Point: Body mechanics (Active)    Description: Instruct learner(s) on proper positioning and spine alignment during self-care, functional mobility  activities and/or exercises.    Learning Progress Summary    Learner Readiness Method Response Comment Documented by Status   Patient Acceptance D,E NR Pt educated on appropriate safety precautions, t/f techniques, HEP, positioning, and benefits of rehab.  12/15/17 1308 Active    Acceptance E VU,NR   12/13/17 1745 Done    Acceptance E VU,NR   12/12/17 2007 Done    Acceptance E,D NR Pt educated on appropriate safety precautions, t/f techniques, HEP, and benefits of therapy.  12/12/17 1315 Active                      User Key     Initials Effective Dates Name Provider Type Discipline     02/23/17 -  Kyung Das, RN Registered Nurse Nurse    TA 03/14/16 -  Azam Ramos, OT Occupational Therapist OT     06/08/16 -  Martine Silverio OT Occupational Therapist OT                  OT Recommendation and Plan  Anticipated Discharge Disposition: inpatient rehabilitation facility  Planned Therapy Interventions: activity intolerance, ADL retraining, balance training, bed mobility training, home exercise program, neuromuscular re-education, ROM (Range of Motion), strengthening, transfer training, visual retraining  Therapy Frequency: daily (Per priority policy)  Plan of Care Review  Plan Of Care Reviewed With: patient  Progress: improving  Outcome Summary/Follow up Plan: Pt demo improved activity tolerance and independence in this date by performing STS t/f and ambulating 135 ft w/ Min Ax2 and RW. Pt conts to require frequent VCs for attention to L side and noted increased tone in LUE. Recommend cont skilled IPOT POC.         Outcome Measures       12/15/17 0819 12/15/17 0818 12/13/17 0925    How much help from another person do you currently need...    Turning from your back to your side while in flat bed without using bedrails? 2  -LS  2  -LM    Moving from lying on back to sitting on the side of a flat bed without bedrails? 2  -LS  2  -LM    Moving to and from a bed to a chair (including a wheelchair)? 3   -LS  2  -LM    Standing up from a chair using your arms (e.g., wheelchair, bedside chair)? 3  -LS  3  -LM    Climbing 3-5 steps with a railing? 2  -LS  1  -LM    To walk in hospital room? 3  -LS  2  -LM    AM-PAC 6 Clicks Score 15  -LS  12  -LM    How much help from another is currently needed...    Putting on and taking off regular lower body clothing?  1  -CL     Bathing (including washing, rinsing, and drying)  2  -CL     Toileting (which includes using toilet bed pan or urinal)  2  -CL     Putting on and taking off regular upper body clothing  2  -CL     Taking care of personal grooming (such as brushing teeth)  2  -CL     Eating meals  3  -CL     Score  12  -CL     Modified [place] Scale    Modified [place] Scale 3 - Moderate disability.  Requiring some help, but able to walk without assistance.  -LS 3 - Moderate disability.  Requiring some help, but able to walk without assistance.  -CL 4 - Moderately severe disability.  Unable to walk without assistance, and unable to attend to own bodily needs without assistance.  -LM    Functional Assessment    Outcome Measure Options AM-PAC 6 Clicks Basic Mobility (PT)  -LS AM-PAC 6 Clicks Daily Activity (OT)  -CL AM-PAC 6 Clicks Basic Mobility (PT);Modified [place]  -LM      User Key  (r) = Recorded By, (t) = Taken By, (c) = Cosigned By    Initials Name Provider Type    LS Keely Bryan, PT Physical Therapist    LM Eugenia Valerio, PT Physical Therapist    CL Martine Silverio OT Occupational Therapist           Time Calculation:         Time Calculation- OT       12/15/17 1311          Time Calculation- OT    OT Start Time 0818  -CL      Total Timed Code Minutes- OT 12 minute(s)  -CL      OT Received On 12/15/17  -CL      OT Goal Re-Cert Due Date 12/20/17  -CL        User Key  (r) = Recorded By, (t) = Taken By, (c) = Cosigned By    Initials Name Provider Type    KANU Silverio OT Occupational Therapist           Therapy Charges for Today     Code Description Service Date Service  Provider Modifiers Qty    [id number]  OT THERAPEUTIC ACT EA 15 MIN 12/15/2017 Martine Silverio OT GO 1               Martine Silverio OT  12/15/2017

## 2017-12-15 NOTE — PROGRESS NOTES
Adult Nutrition  Assessment/PES    Patient Name:  Lois Brennan  YOB: 1940  MRN: [id number]  Admit Date:  12/9/2017    Assessment Date:  12/15/2017    Comments:  Pt w/ positive po intake trend,  cont to monitor.          Reason for Assessment       12/15/17 1419    Reason for Assessment    Reason For Assessment/Visit follow up protocol;multidisciplinary rounds    Identified At Risk By Screening Criteria dysphagia or difficulty swallowing    Diagnosis --   per notes of this adm              Nutrition/Diet History       12/15/17 1420    Nutrition/Diet History    Reported/Observed By RN    Appetite Improved    Other pt eating bites to 75 % of meals; awaiting transfer to [facility]                    Nutrition Prescription Ordered       12/15/17 1420    Nutrition Prescription PO    Current PO Diet Dysphagia    Dysphagia Level 3  Pureed with some mashed    Fluid Consistency Nectar/syrup thick    Common Modifiers Cardiac;Consistent Carbohydrate            Evaluation of Received Nutrient/Fluid Intake       12/15/17 1421    PO Evaluation    Number of Days PO Intake Evaluated 2 days    Number of Meals 4    % PO Intake 50            Problem/Interventions:          Problem 2       12/15/17 1421    Nutrition Diagnoses Problem 2    Problem 2 Predicted Suboptimal Intake    Etiology (related to) MNT for Treatment/Condition;Functional Diagnosis    Functional Diagnosis Dysphagia    Signs/Symptoms (evidenced by) PO Intake    Percent (%) intake recorded 50 %    Over number of meals 4                  Intervention Goal       12/15/17 1422    Intervention Goal    General Meet nutritional needs for age/condition    PO Increase intake;Continue positive trend            Nutrition Intervention       12/15/17 1422    Nutrition Intervention    RD/Tech Action Follow Tx progress;Care plan reviewd   Pt to dc this afternoon to [facility]              Education/Evaluation       12/15/17 1423    Monitor/Evaluation    Monitor Per  protocol;I&O;Pertinent labs;Symptoms;PO intake        Electronically signed by:  Heather Kerns RD  12/15/17 2:23 PM

## 2017-12-15 NOTE — THERAPY TREATMENT NOTE
Acute Care - Physical Therapy Treatment Note  [facility]     Patient Name: Lois Brennan  : 1940  MRN: [id number]  Today's Date: 12/15/2017  Onset of Illness/Injury or Date of Surgery Date: 17  Date of Referral to PT: 17  Referring Physician: Dr. Espitia    Admit Date: 2017    Visit Dx:    ICD-10-CM ICD-9-CM   1. Intracranial hematoma, right, without loss of consciousness, initial encounter S06.340A 853.01   2. Impaired mobility and ADLs Z74.09 799.89   3. Impaired functional mobility, balance, gait, and endurance Z74.09 V49.89     Patient Active Problem List   Diagnosis   • Intracranial hemorrhage   • Essential hypertension   • Type 2 diabetes mellitus with complication, without long-term current use of insulin   • Pharyngeal dysphagia   • GERD (gastroesophageal reflux disease)   • Dementia   • Rheumatoid arthritis   • Paroxysmal atrial fibrillation               Adult Rehabilitation Note       12/15/17 0819 17 0925 17 1100    Rehab Assessment/Intervention    Discipline physical therapist  -LS physical therapist  -LM speech language pathologist  -SM    Document Type therapy note (daily note)  -LS therapy note (daily note)  -LM re-evaluation;therapy note (daily note)  -SM    Subjective Information agree to therapy;no complaints  -LS agree to therapy;no complaints  -LM     Patient Effort, Rehab Treatment excellent  -LS good  -LM     Symptoms Noted During/After Treatment  significant change in vital signs   HR would jump around from 150's-180's with amb  -LM     Symptoms Noted Comment Noted highest HR during gait at 145 bpm. Decreased with standing rest break.   -LS RN notified  -LM     Precautions/Limitations fall precautions;oxygen therapy device and L/min;cardiac precautions   monitor HR  -LS fall precautions;other (see comments)   Monitor HR closely  -LM     Recorded by [LS] Keely Bryan, PT [LM] Eugenia Valerio, PT [SM] Violet Hidalgo, MS CCC-SLP    Vital Signs     Pre Systolic BP Rehab 146  -  -LM     Pre Treatment Diastolic BP 73  -LS 67  -LM     Post Systolic BP Rehab 156  -  -LM     Post Treatment Diastolic BP 60  -LS 75  -LM     Pretreatment Heart Rate (beats/min) 80  -LS 98  -LM     Intratreatment Heart Rate (beats/min)  --   150's-180's w/ 60 feet of amb  -LM     Posttreatment Heart Rate (beats/min) 76  -  -LM     Pre SpO2 (%) 96  -LS 96  -LM     O2 Delivery Pre Treatment room air  -LS room air  -LM     Post SpO2 (%) 98  -LS 97  -LM     O2 Delivery Post Treatment room air  -LS room air  -LM     Pre Patient Position Supine  -LS Supine  -LM     Intra Patient Position Standing  -LS Standing  -LM     Post Patient Position Sitting  -LS Sitting  -LM     Recorded by [LS] Keely Bryan, PT [LM] Eugenia Valerio PT     Pain Assessment    Pain Assessment 0-10  -LS 0-10  -LM     Pain Score 0  -LS 0  -LM     Post Pain Score 0  -LS 0  -LM     Recorded by [LS] Keely Bryan, PT [LM] Eugenia Valerio PT     Cognitive Assessment/Intervention    Current Cognitive/Communication Assessment impaired  -LS impaired  -LM     Orientation Status oriented to;person;place;required verbal cueing (specifiy in comments)   cues for month  -LS oriented to;person;time;disoriented to;place  -LM     Follows Commands/Answers Questions able to follow single-step instructions;100% of the time;needs cueing;needs increased time;needs repetition  -% of the time;able to follow single-step instructions;needs cueing;needs increased time  -LM     Personal Safety mild impairment;decreased awareness, need for safety;decreased awareness, need for assist;decreased insight to deficits  -LS moderate impairment;decreased awareness, need for safety  -LM     Personal Safety Interventions elopement precautions initiated;fall prevention program maintained;nonskid shoes/slippers when out of bed  -LS fall prevention program maintained;gait belt;muscle strengthening facilitated;nonskid shoes/slippers when out  of bed  -LM     Additional Documentation   Cognitive Assessment Intervention (Group)  -SM    Recorded by [LS] Keely Bryan, PT [LM] Eugenia Valerio, PT [SM] Violet Hidalgo MS CCC-SLP    Cognitive Assessment Intervention    Pragmatics   flat affect;initiation problems;topic maintenance problems;poor eye contact  -SM    Organization   other (see comments)   successfully completed word search puzzle  -SM    Recorded by   [SM] Violet Hidalgo MS CCC-SLP    Improve articulation    Improve articulation:   by over-articulating at word level;by over-articulating at phrase level  -SM    Status: Improve articulation   Progressing as expected  -SM    Articulation Progress   70%;with inconsistent cues  -SM    Recorded by   [SM] Violet Hidalgo MS CCC-SLP    Bed Mobility, Assessment/Treatment    Bed Mobility, Assistive Device head of bed elevated;draw sheet  -LS bed rails;head of bed elevated;draw sheet  -LM     Bed Mobility, Scoot/Bridge, [place]  maximum assist (25% patient effort);verbal cues required;nonverbal cues required (demo/gesture)  -LM     Bed Mob, Supine to Sit, [place] moderate assist (50% patient effort);verbal cues required  -LS moderate assist (50% patient effort);verbal cues required  -LM     Bed Mob, Sit to Supine, [place]  not tested  -LM     Recorded by [LS] Keely Bryan, PT [LM] Eugenia Valerio, PT     Transfer Assessment/Treatment    Transfers, Sit-Stand [place] minimum assist (75% patient effort);2 person assist required;verbal cues required  -LS minimum assist (75% patient effort);verbal cues required;nonverbal cues required (demo/gesture)  -LM     Transfers, Stand-Sit [place] minimum assist (75% patient effort);2 person assist required;verbal cues required  -LS minimum assist (75% patient effort);verbal cues required;nonverbal cues required (demo/gesture)  -LM     Transfers, Sit-Stand-Sit, Assist Device rolling walker  -LS rolling walker   Assist for L hand placement   -LM     Transfer, Safety Issues  step length decreased;balance decreased during turns;sequencing ability decreased  -LM     Transfer, Impairments  strength decreased;impaired balance  -LM     Transfer, Comment VC's for hand placement and achieving upright posture.   -LS      Recorded by [LS] Keely Bryan, PT [LM] Eugenia Valerio, PT     Gait Assessment/Treatment    Gait, [place] minimum assist (75% patient effort);2 person assist required  -LS moderate assist (50% patient effort);1 person + 1 person to manage equipment  -LM     Gait, Assistive Device rolling walker  -LS rolling walker  -LM     Gait, Distance (Feet) 130  -LS 60  -LM     Gait, Gait Deviations bhupinder decreased;step length decreased  -LS bhupinder decreased;narrow base  -LM     Gait, Safety Issues  step length decreased;balance decreased during turns  -LM     Gait, Impairments strength decreased;impaired balance  -LS strength decreased;impaired balance  -LM     Gait, Comment Req'd occasional assist for maintaining appropriate  L on RW and for negotiation of turns. VC's for increasing step length and width.   -LS Requires assist to maintain LUE on walker;  Pt leans posteriorly and to the left  -LM     Recorded by [LS] Keely Bryan, PT [LM] Eugenia Valerio, PT     Stairs Assessment/Treatment    Stairs, [place]  not tested  -LM     Recorded by  [LM] Eugenia Valerio PT     Motor Skills/Interventions    Additional Documentation Balance Skills Training (Group)  -LS      Recorded by [LS] Keely Bryan, PT      Balance Skills Training    Sitting-Level of Assistance Contact guard   leans to L; vc's for midline  -LS Contact guard  -LM     Sitting-Balance Support Feet supported  -LS Feet supported;Right upper extremity supported;Left upper extremity supported  -LM     Sitting-Balance Activities  Trunk control activities  -LM     Standing-Level of Assistance Minimum assistance;x2  -LS Moderate assistance  -LM     Static Standing Balance Support  assistive device  -LS assistive device  -LM     Standing-Balance Activities  Weight Shift A-P  -LM     Gait Balance-Level of Assistance Minimum assistance;x2  -LS Moderate assistance  -LM     Gait Balance Support assistive device  -LS assistive device  -LM     Recorded by [LS] Keely Bryan, PT [LM] Eugenia Valerio, MAC     Therapy Exercises    Bilateral Lower Extremities AROM:;10 reps;sitting;ankle pumps/circles;LAQ;hip flexion   alternating LAQ and hip flex for coordination  -LS AROM:;15 reps;sitting;ankle pumps/circles;glut sets;hip abduction/adduction;hip flexion;LAQ  -LM     Recorded by [LS] Keely Bryan, PT [LM] Eugenia Valerio, MAC     Positioning and Restraints    Pre-Treatment Position in bed  -LS in bed  -LM     Post Treatment Position chair  -LS chair  -LM     In Chair notified nsg;reclined;call light within reach;encouraged to call for assist;exit alarm on;RUE elevated;LUE elevated;legs elevated;heels elevated  -LS reclined;call light within reach;encouraged to call for assist;exit alarm on;notified nsg  -LM     Recorded by [LS] Keely Bryan, PT [LM] Eugenia Valerio, PT       User Key  (r) = Recorded By, (t) = Taken By, (c) = Cosigned By    Initials Name Effective Dates    BHAVYA Hidalgo MS CCC-SLP 06/22/15 -     LS Keely Bryan, PT 06/19/15 -     LM Eugenia Valerio, PT 06/15/16 -                 IP PT Goals       12/15/17 0913 12/13/17 0925 12/12/17 0924    Transfer Training PT LTG    Transfer Training PT LTG, Outcome goal ongoing  -LS goal ongoing  -LM goal ongoing  -MJ    Gait Training PT LTG    Gait Training Goal PT LTG, [place] Level contact guard assist  -LS      Gait Training Goal PT LTG, Distance to Achieve 200  -LS      Gait Training Goal PT LTG, Date Goal Reviewed 12/15/17  -LS      Gait Training Goal PT LTG, Outcome goal revised   previous goal achieved today  -LS goal ongoing  -LM goal ongoing  -MJ    Static Sitting Balance PT LTG    Static Sitting Balance PT LTG, Outcome goal ongoing  -LS  goal ongoing  - goal ongoing  -MJ      12/11/17 0908 12/10/17 1021       Transfer Training PT LTG    Transfer Training PT LTG, Date Established  12/10/17  -SJ     Transfer Training PT LTG, Time to Achieve  2 wks  -SJ     Transfer Training PT LTG, Activity Type  bed to chair /chair to bed;sit to stand/stand to sit  -SJ     Transfer Training PT LTG, [place] Level  contact guard assist  -SJ     Transfer Training PT LTG, Outcome goal ongoing  - goal ongoing  -SJ     Gait Training PT LTG    Gait Training Goal PT LTG, Date Established  12/10/17  -SJ     Gait Training Goal PT LTG, Time to Achieve  2 wks  -SJ     Gait Training Goal PT LTG, [place] Level  minimum assist (75% patient effort);2 person assist required  -SJ     Gait Training Goal PT LTG, Distance to Achieve  100  -SJ     Gait Training Goal PT LTG, Outcome goal ongoing  - goal ongoing  -SJ     Static Sitting Balance PT LTG    Static Sitting Balance PT LTG, Date Established  12/10/17  -SJ     Static Sitting Balance PT LTG, Time to Achieve  2 wks  -SJ     Static Sitting Balance PT LTG, [place] Level  supervision required  -SJ     Static Sitting Balance PT LTG, Assist Device  UE Support  -SJ     Static Sitting Balance PT LTG, Outcome goal ongoing  - goal ongoing  -SJ       User Key  (r) = Recorded By, (t) = Taken By, (c) = Cosigned By    Initials Name Provider Type    QUE Arboleda, PT Physical Therapist    LS Keely Bryan, PT Physical Therapist    LM Eugenia Valerio, PT Physical Therapist    MJ Hamida De Paz, PT Physical Therapist          Physical Therapy Education     Title: PT OT SLP Therapies (Active)     Topic: Physical Therapy (Active)     Point: Mobility training (Active)    Learning Progress Summary    Learner Readiness Method Response Comment Documented by Status   Patient Acceptance E,D NR  LS 12/15/17 0912 Active    Acceptance E ASHOK DINERO 12/13/17 1745 Done    Acceptance E ASHOK KABA LM 12/13/17 1010 Done    Acceptance E ROSETTENR    12/12/17 2007 Done    Acceptance E NR   12/12/17 0924 Active    Acceptance E [facility] [place] 12/11/17 0908 Active    Acceptance E [facility] 12/10/17 1025 Active               Point: Home exercise program (Active)    Learning Progress Summary    Learner Readiness Method Response Comment Documented by Status   Patient Acceptance E,D NR   12/15/17 0912 Active    Acceptance E VU,NR   12/13/17 1745 Done    Acceptance E DU,NR   12/13/17 1010 Done    Acceptance E VU,[facility] 12/12/17 2007 Done    Acceptance E NR   12/12/17 0924 Active    Acceptance E NR   12/11/17 0908 Active    Acceptance E [facility] 12/10/17 1025 Active               Point: Body mechanics (Active)    Learning Progress Summary    Learner Readiness Method Response Comment Documented by Status   Patient Acceptance E,D NR   12/15/17 0912 Active    Acceptance E VU,[facility] 12/13/17 1745 Done    Acceptance E VU,[facility] 12/12/17 2007 Done    Acceptance E [facility] 12/12/17 0924 Active    Acceptance E [facility] 12/11/17 0908 Active    Acceptance E [facility] 12/10/17 1025 Active               Point: Precautions (Active)    Learning Progress Summary    Learner Readiness Method Response Comment Documented by Status   Patient Acceptance E,D NR   12/15/17 0912 Active    Acceptance E VU,[facility] 12/13/17 1745 Done    Acceptance E DU,NR   12/13/17 1010 Done    Acceptance E VU,[facility] 12/12/17 2007 Done    Acceptance E [facility] 12/12/17 0924 Active    Acceptance E [facility] 12/11/17 0908 Active    Acceptance E [facility] 12/10/17 1025 Active                      User Key     Initials Effective Dates Name Provider Type Discipline     06/19/15 -  Yumiko Arboleda, PT Physical Therapist PT     06/19/15 -  Keely Bryan, PT Physical Therapist PT     06/15/16 -  Eugenia Valerio, PT Physical Therapist PT     02/23/17 -  Kyung Das, RN Registered Nurse Nurse     10/30/17 -  Hamida De Paz, PT Physical Therapist PT                    PT Recommendation and Plan  Anticipated Equipment  Needs At Discharge: other (see comments) (TBD)  Anticipated Discharge Disposition: inpatient rehabilitation facility  Planned Therapy Interventions: balance training, bed mobility training, gait training, home exercise program, neuromuscular re-education, patient/family education, strengthening, transfer training  PT Frequency: daily, per priority policy  Plan of Care Review  Plan Of Care Reviewed With: patient  Progress: improving  Outcome Summary/Follow up Plan: Pt demonstrated increased indep with gait; progressed forward ambulation distance to 130 total ft with RWx and min x2 A.  Cont to require verbal/tactile cues for improvements in balance and L-side coordination. Gait goal modified as previous goal achieved; will cont to progress as clinically warranted.           Outcome Measures       12/15/17 0819 12/13/17 0925       How much help from another person do you currently need...    Turning from your back to your side while in flat bed without using bedrails? 2  -LS 2  -LM     Moving from lying on back to sitting on the side of a flat bed without bedrails? 2  -LS 2  -LM     Moving to and from a bed to a chair (including a wheelchair)? 3  -LS 2  -LM     Standing up from a chair using your arms (e.g., wheelchair, bedside chair)? 3  -LS 3  -LM     Climbing 3-5 steps with a railing? 2  -LS 1  -LM     To walk in hospital room? 3  -LS 2  -LM     AM-PAC 6 Clicks Score 15  -LS 12  -LM     Modified [place] Scale    Modified [place] Scale 3 - Moderate disability.  Requiring some help, but able to walk without assistance.  -LS 4 - Moderately severe disability.  Unable to walk without assistance, and unable to attend to own bodily needs without assistance.  -LM     Functional Assessment    Outcome Measure Options AM-PAC 6 Clicks Basic Mobility (PT)  -LS AM-PAC 6 Clicks Basic Mobility (PT);Modified [place]  -LM       User Key  (r) = Recorded By, (t) = Taken By, (c) = Cosigned By    Initials Name Provider Type    NASRIN SPENCE  Randi, PT Physical Therapist    LM Eugenia Valerio, PT Physical Therapist           Time Calculation:         PT Charges       12/15/17 0919          Time Calculation    Start Time 0819  -LS      PT Received On 12/15/17  -      PT Goal Re-Cert Due Date 12/20/17  -      Time Calculation- PT    Total Timed Code Minutes- PT 14 minute(s)  -        User Key  (r) = Recorded By, (t) = Taken By, (c) = Cosigned By    Initials Name Provider Type     Keely Bryan, PT Physical Therapist          Therapy Charges for Today     Code Description Service Date Service Provider Modifiers Qty    [id number] HC GAIT TRAINING EA 15 MIN 12/15/2017 Keely Bryan, PT GP 1          PT G-Codes  Outcome Measure Options: AM-PAC 6 Clicks Basic Mobility (PT)    Keely Bryan, PT  12/15/2017

## 2017-12-15 NOTE — PROGRESS NOTES
Continued Stay Note  [facility]     Patient Name: Lois Brennan  MRN: [id number]  Today's Date: 12/15/2017    Admit Date: 12/9/2017          Discharge Plan       12/15/17 1252    Case Management/Social Work Plan    Additional Comments Per [facility] Parmar, it is ok to transfer to [facility] today.  Approval has been obtained.  Plan to transfer at 1600 today.                Discharge Codes     None        Expected Discharge Date and Time     Expected Discharge Date Expected Discharge Time    Dec 15, 2017             Nnamdi Hatch RN

## 2017-12-21 NOTE — PAYOR COMM NOTE
"Lois Dixon (77 y.o. Female)     Date of Birth Social Security Number Address Home Phone MRN    1940  [address] [facility] [id number] [phone] [id number]    [employer] Marital Status          [employer]        Admission Date Admission Type Admitting Provider Attending Provider Department, Room/Bed    17 Emergency Aquiles Silva MD  [facility] 2B ICU, N231/1    Discharge Date Discharge Disposition Discharge Destination        12/15/2017 Rehab Facility or Unit (DC - External)             Attending Provider: (none)    Allergies:  Ace Inhibitors, Penicillins    Isolation:  None   Infection:  None   Code Status:  Prior    Ht:  157.5 cm (62\")   Wt:  67.8 kg (149 lb 7.6 oz)    Admission Cmt:  None   Principal Problem:  Intracranial hemorrhage [I62.9]                 Active Insurance as of 2017     Primary Coverage     Payor Plan Insurance Group Employer/Plan Group    ANTHEM MEDICARE REPLACEMENT ANTH MEDICARE ADVANTAGE [id number]     Payor Plan Address Payor Plan Phone Number Effective From Effective To    [address] [phone] [address]     [place] [address]       Subscriber Name Subscriber Birth Date Member ID       LOIS DIXON 1940 [id number]                 Emergency Contacts      (Rel.) Home Phone Work Phone Mobile Phone    Keely Leblanc (Power of ) -- -- [phone]               Discharge Summary      Aquiles Silva MD at 2017 10:56 AM          Transfer Summary    Patient name: Lois Dixon     CSN: [id number]     MRN: [id number]     : 1940     Today's date: 2017     Date of Admission: 2017     Date of Discharge:  2017    Admitting Physician:  Aury Espitia MD    Primary Care Provider: No Known Provider     Consultations:  Clifton Haider MD, Cardiology     Leo Proctor MD, Neurosurgery      Admission Diagnosis: Intracranial Hemorrhage     Transfer " Diagnoses:   Hospital Problem List     * (Principal)Intracranial hemorrhage    Essential hypertension    Type 2 diabetes mellitus with complication, without long-term current use of insulin    Pharyngeal dysphagia    GERD (gastroesophageal reflux disease)    Dementia    Rheumatoid arthritis    Paroxysmal atrial fibrillation          Allergies:  Ace inhibitors and Penicillins    Code Status:  Full Code    Procedures:         History of Present Illness:    Patient is a 77 y.o. female was not answering her daughter this morning. She lives next door to her sister-in-law who has a key. When she entered the house the patient was still in bed and could not move her left side and was mumbling. She called the ambulance. She did talk to a family member on the phone last night and was normal. In the emergency room her CT scan revealed a right frontal lobe hemorrhage with edema and mass effect. She had significant hypertension and was started on a Cardene drip. Initially she was not moving her left side but her symptoms did not improve and she was able to lift her left leg. She does have a history of transient ischemic attacks and had one approximately one week ago. There is no history of atrial fibrillation she is a lifetime nonsmoker. She does use some nitroglycerin but denies ever having a heart attack. She is a lifetime nonsmoker.    Hospital Course:    The patient was started on a Cardene drip for hypertension and admitted to the neuro ICU for close monitoring. Neurosurgery was consulted however she did not require surgical intervention as the area of hemorrhage remained stable.  Physical, occupational, and speech therapy were consulted and worked with the patient daily.  By the day of discharge she was able to sit with moderate assistance and ambulate 60 feet with moderate assistance.  She initially had significant dysphasia and required enteral feeding but on repeat evaluation she had inconsistent aspiration with thin  "liquids but was able to progress to a dysphagia level III purée with nectar thick liquids.  She was seen by cardiology for onset of atrial fibrillation and was started on amiodarone.  Echocardiogram showed an EF of 70% with normal wall thickness and ventricular cavity size.  On 12/14 she was felt to have received maximal benefit from hospitalization and was felt ready for transfer to [facility] for further rehabilitation.  She will be transferred today via ambulance.      Vitals:  /98  Pulse 67  Temp 98.3 °F (36.8 °C) (Oral)   Resp 16  Ht 157.5 cm (62\")  Wt 67.8 kg (149 lb 7.6 oz)  SpO2 99%  BMI 27.34 kg/m2    Physical Exam:  Constitutional:  Appears well-developed and well-nourished. No distress.   HEENT:  Normocephalic and atraumatic. PERRL  Neck: Neck supple. No JVD present.   CV: Normal rate, regular rhythm, intact distal pulses.  No gallop, murmur, or rub.  Pulmonary/Chest: Effort normal and breath sounds normal. No respiratory distress. No wheezes, rhonchi or rales.    Abdominal: Soft. +BS. No distension and no mass. There is no tenderness.   Musculoskeletal: Normal muscle tone and strength  Neurological:  Alert and oriented to person, place, and time.  2/5 LUE, 4/5 LLE, left facial droop.   Skin: Skin is warm and dry. No rash noted.   Extremities:  No clubbing, edema or cyanosis  Psychiatric: Normal mood and affect. Behavior is normal.     Labs:    Results from last 7 days  Lab Units 12/13/17  0450   WBC 10*3/mm3 7.62   HEMOGLOBIN g/dL 12.2   HEMATOCRIT % 38.1   PLATELETS 10*3/mm3 217       Results from last 7 days  Lab Units 12/13/17  0450  12/09/17  1539   SODIUM mmol/L 136  < > 135   POTASSIUM mmol/L 4.3  < > 3.7   CHLORIDE mmol/L 104  < > 102   CO2 mmol/L 27.0  < > 27.0   BUN mg/dL 28*  < > 11   CREATININE mg/dL 1.20  < > 1.00   CALCIUM mg/dL 8.7  < > 8.9   BILIRUBIN mg/dL  --   --  0.6   ALK PHOS U/L  --   --  75   ALT (SGPT) U/L  --   --  7   AST (SGOT) U/L  --   --  12 "   GLUCOSE mg/dL 180*  < > 166*   < > = values in this interval not displayed.      Magnesium   Date Value Ref Range Status   12/13/2017 2.2 1.3 - 2.7 mg/dL Final     Phosphorus   Date Value Ref Range Status   12/13/2017 4.4 2.4 - 5.1 mg/dL Final        Transfer Medications:   Lois Brennan   Home Medication Instructions KIMBERLEE:[id number]    Printed on:12/14/17 112   Medication Information                      amiodarone (PACERONE) 400 MG tablet  Take 1 tablet by mouth Every 12 (Twelve) Hours.             amLODIPine (NORVASC) 2.5 MG tablet  Take 2.5 mg by mouth Daily.             donepezil (ARICEPT) 10 MG tablet  Take 10 mg by mouth Every Night.             metFORMIN ER (GLUCOPHAGE-XR) 500 MG 24 hr tablet  Take 500 mg by mouth Daily With Breakfast.             metFORMIN ER (GLUCOPHAGE-XR) 500 MG 24 hr tablet  Take 1,000 mg by mouth Daily With Dinner.             metoprolol tartrate (LOPRESSOR) 25 MG tablet  Take 0.5 tablets by mouth Every 12 (Twelve) Hours.             POTASSIUM CHLORIDE PO  Take 10 mEq by mouth Daily.             predniSONE (DELTASONE) 5 MG tablet  Take 7.5 mg by mouth Daily With Breakfast.             raNITIdine (ZANTAC) 300 MG tablet  Take 300 mg by mouth 2 (Two) Times a Day.             simvastatin (ZOCOR) 20 MG tablet  Take 20 mg by mouth Every Night.                 Diet:   Diet Instructions     Diet: Consistent Carbohydrate, Cardiac, Dysphagia; Nectar / Syrup Thick Liquids; Pureed With Some Mashed; Nectar / Syrup Thick       Discharge Diet:   Consistent Carbohydrate  Cardiac  Dysphagia      Fluid Consistency:  Nectar / Syrup Thick Liquids   Pureed Options:  Pureed With Some Mashed   Fluid Consistency:  Nectar / Syrup Thick                 Activity at Transfer:    Activity Instructions     Activity as Tolerated                     Follow-up Appointments  Future Appointments  Date Time Provider Department Center   1/5/2018 12:30 PM JULIAN Jarvis NS [facility] None   1/23/2018 3:30 PM Soy DANIEL  Merna KOEHLER MD [facility] None     Additional Instructions for the Follow-ups that You Need to Schedule     Discharge Follow-up with Specified Provider: MsMarco A Sosa PA-C in Dr. Proctor's office; 3 Weeks    As directed    To:  Ms. Sheila JORDAN in Dr. Proctor's office    Follow Up:  3 Weeks    Follow Up Details:  With CT of the brain without contrast                     Transfer Instructions:  Transfer to [facility] today via ambulance at 5 PM       SHYANN Shrestha, [facility]  Pulmonary & Critical Care Medicine  I performed an independent history and physical examination. Portions of the history were obtained by SHYANN and were modified by me according to my findings. The above note reflects my findings, assessment, and plan.    Aquiles Silva MD    See my daily progress note from today for details and my discharge time documentation.    Aquiles Silva MD 12/14/17 1:32 PM      CC: No Known Provider           .     Electronically signed by Aquiles Silva MD at 12/14/2017  1:32 PM        Discharge Order     Start     Ordered    12/14/17 1054  Discharge patient  Once     Expected Discharge Date:  12/14/17    Expected Discharge Time:  Afternoon    Discharge Disposition:  Rehab Facility or Unit (DC - External)    Please choose which facility the patient is currently admitted if they are being discharged to another facility or unit.:  [facility]    Mode:  Ambulance (medically necessary)       Question Answer Comment   Please choose which facility the patient is currently admitted if they are being discharged to another facility or unit. [facility]    Mode: Ambulance (medically necessary)        12/14/17 1051

## 2017-12-22 ENCOUNTER — APPOINTMENT (OUTPATIENT)
Dept: GENERAL RADIOLOGY | Facility: HOSPITAL | Age: 77
End: 2017-12-22

## 2017-12-22 ENCOUNTER — APPOINTMENT (OUTPATIENT)
Dept: CT IMAGING | Facility: HOSPITAL | Age: 77
End: 2017-12-22

## 2017-12-22 ENCOUNTER — HOSPITAL ENCOUNTER (INPATIENT)
Facility: HOSPITAL | Age: 77
LOS: 14 days | Discharge: NURSING, REHAB OR LONG-TERM CARE | End: 2018-01-05
Attending: EMERGENCY MEDICINE | Admitting: INTERNAL MEDICINE

## 2017-12-22 ENCOUNTER — APPOINTMENT (OUTPATIENT)
Dept: MRI IMAGING | Facility: HOSPITAL | Age: 77
End: 2017-12-22

## 2017-12-22 DIAGNOSIS — Z74.09 IMPAIRED FUNCTIONAL MOBILITY, BALANCE, GAIT, AND ENDURANCE: ICD-10-CM

## 2017-12-22 DIAGNOSIS — I48.0 PAROXYSMAL ATRIAL FIBRILLATION (HCC): ICD-10-CM

## 2017-12-22 DIAGNOSIS — Z74.09 IMPAIRED MOBILITY AND ADLS: ICD-10-CM

## 2017-12-22 DIAGNOSIS — R13.13 PHARYNGEAL DYSPHAGIA: Primary | ICD-10-CM

## 2017-12-22 DIAGNOSIS — E11.69 DIABETES MELLITUS TYPE 2 IN OBESE (HCC): ICD-10-CM

## 2017-12-22 DIAGNOSIS — R40.0 SOMNOLENCE: ICD-10-CM

## 2017-12-22 DIAGNOSIS — E66.9 DIABETES MELLITUS TYPE 2 IN OBESE (HCC): ICD-10-CM

## 2017-12-22 DIAGNOSIS — I61.1 NONTRAUMATIC CORTICAL HEMORRHAGE OF RIGHT CEREBRAL HEMISPHERE (HCC): ICD-10-CM

## 2017-12-22 DIAGNOSIS — R53.1 LEFT-SIDED WEAKNESS: ICD-10-CM

## 2017-12-22 DIAGNOSIS — I10 HYPERTENSION, UNSPECIFIED TYPE: ICD-10-CM

## 2017-12-22 DIAGNOSIS — Z78.9 IMPAIRED MOBILITY AND ADLS: ICD-10-CM

## 2017-12-22 PROBLEM — R41.82 ALTERED MENTAL STATUS: Status: ACTIVE | Noted: 2017-12-22

## 2017-12-22 LAB
ALBUMIN SERPL-MCNC: 4 G/DL (ref 3.2–4.8)
ALBUMIN/GLOB SERPL: 1.3 G/DL (ref 1.5–2.5)
ALP SERPL-CCNC: 80 U/L (ref 25–100)
ALT SERPL W P-5'-P-CCNC: 12 U/L (ref 7–40)
ANION GAP SERPL CALCULATED.3IONS-SCNC: 12 MMOL/L (ref 3–11)
APTT PPP: <24 SECONDS (ref 24–31)
AST SERPL-CCNC: 18 U/L (ref 0–33)
BASOPHILS # BLD AUTO: 0.02 10*3/MM3 (ref 0–0.2)
BASOPHILS NFR BLD AUTO: 0.2 % (ref 0–1)
BILIRUB SERPL-MCNC: 0.3 MG/DL (ref 0.3–1.2)
BILIRUB UR QL STRIP: NEGATIVE
BUN BLD-MCNC: 23 MG/DL (ref 9–23)
BUN/CREAT SERPL: 15.3 (ref 7–25)
CALCIUM SPEC-SCNC: 9.2 MG/DL (ref 8.7–10.4)
CHLORIDE SERPL-SCNC: 102 MMOL/L (ref 99–109)
CLARITY UR: CLEAR
CO2 SERPL-SCNC: 20 MMOL/L (ref 20–31)
COLOR UR: YELLOW
CREAT BLD-MCNC: 1.5 MG/DL (ref 0.6–1.3)
D-LACTATE SERPL-SCNC: 1.3 MMOL/L (ref 0.5–2)
DEPRECATED RDW RBC AUTO: 47.2 FL (ref 37–54)
EOSINOPHIL # BLD AUTO: 0.01 10*3/MM3 (ref 0–0.3)
EOSINOPHIL NFR BLD AUTO: 0.1 % (ref 0–3)
ERYTHROCYTE [DISTWIDTH] IN BLOOD BY AUTOMATED COUNT: 14.7 % (ref 11.3–14.5)
GFR SERPL CREATININE-BSD FRML MDRD: 34 ML/MIN/1.73
GLOBULIN UR ELPH-MCNC: 3.1 GM/DL
GLUCOSE BLD-MCNC: 301 MG/DL (ref 70–100)
GLUCOSE BLDC GLUCOMTR-MCNC: 207 MG/DL (ref 70–130)
GLUCOSE UR STRIP-MCNC: ABNORMAL MG/DL
HCT VFR BLD AUTO: 44.9 % (ref 34.5–44)
HGB BLD-MCNC: 14.8 G/DL (ref 11.5–15.5)
HGB UR QL STRIP.AUTO: NEGATIVE
HOLD SPECIMEN: NORMAL
HOLD SPECIMEN: NORMAL
IMM GRANULOCYTES # BLD: 0.06 10*3/MM3 (ref 0–0.03)
IMM GRANULOCYTES NFR BLD: 0.5 % (ref 0–0.6)
KETONES UR QL STRIP: NEGATIVE
LEUKOCYTE ESTERASE UR QL STRIP.AUTO: NEGATIVE
LYMPHOCYTES # BLD AUTO: 0.98 10*3/MM3 (ref 0.6–4.8)
LYMPHOCYTES NFR BLD AUTO: 7.8 % (ref 24–44)
MCH RBC QN AUTO: 29.4 PG (ref 27–31)
MCHC RBC AUTO-ENTMCNC: 33 G/DL (ref 32–36)
MCV RBC AUTO: 89.3 FL (ref 80–99)
MONOCYTES # BLD AUTO: 0.37 10*3/MM3 (ref 0–1)
MONOCYTES NFR BLD AUTO: 2.9 % (ref 0–12)
NEUTROPHILS # BLD AUTO: 11.2 10*3/MM3 (ref 1.5–8.3)
NEUTROPHILS NFR BLD AUTO: 88.5 % (ref 41–71)
NITRITE UR QL STRIP: NEGATIVE
PH UR STRIP.AUTO: <=5 [PH] (ref 5–8)
PLATELET # BLD AUTO: 264 10*3/MM3 (ref 150–450)
PMV BLD AUTO: 11.4 FL (ref 6–12)
POTASSIUM BLD-SCNC: 5.4 MMOL/L (ref 3.5–5.5)
PROT SERPL-MCNC: 7.1 G/DL (ref 5.7–8.2)
PROT UR QL STRIP: NEGATIVE
RBC # BLD AUTO: 5.03 10*6/MM3 (ref 3.89–5.14)
SODIUM BLD-SCNC: 134 MMOL/L (ref 132–146)
SP GR UR STRIP: 1.02 (ref 1–1.03)
TROPONIN I SERPL-MCNC: 0.1 NG/ML (ref 0–0.07)
TROPONIN I SERPL-MCNC: 0.11 NG/ML (ref 0–0.07)
UROBILINOGEN UR QL STRIP: ABNORMAL
WBC NRBC COR # BLD: 12.64 10*3/MM3 (ref 3.5–10.8)
WHOLE BLOOD HOLD SPECIMEN: NORMAL
WHOLE BLOOD HOLD SPECIMEN: NORMAL

## 2017-12-22 PROCEDURE — 25010000003 LEVETIRACETAM IN NACL 0.75% 1000 MG/100ML SOLUTION: Performed by: EMERGENCY MEDICINE

## 2017-12-22 PROCEDURE — 84484 ASSAY OF TROPONIN QUANT: CPT

## 2017-12-22 PROCEDURE — 85025 COMPLETE CBC W/AUTO DIFF WBC: CPT

## 2017-12-22 PROCEDURE — 81003 URINALYSIS AUTO W/O SCOPE: CPT | Performed by: EMERGENCY MEDICINE

## 2017-12-22 PROCEDURE — 93005 ELECTROCARDIOGRAM TRACING: CPT

## 2017-12-22 PROCEDURE — 70551 MRI BRAIN STEM W/O DYE: CPT

## 2017-12-22 PROCEDURE — 70450 CT HEAD/BRAIN W/O DYE: CPT

## 2017-12-22 PROCEDURE — P9612 CATHETERIZE FOR URINE SPEC: HCPCS

## 2017-12-22 PROCEDURE — 82962 GLUCOSE BLOOD TEST: CPT

## 2017-12-22 PROCEDURE — G8996 SWALLOW CURRENT STATUS: HCPCS

## 2017-12-22 PROCEDURE — 93005 ELECTROCARDIOGRAM TRACING: CPT | Performed by: INTERNAL MEDICINE

## 2017-12-22 PROCEDURE — 92610 EVALUATE SWALLOWING FUNCTION: CPT

## 2017-12-22 PROCEDURE — 80053 COMPREHEN METABOLIC PANEL: CPT | Performed by: EMERGENCY MEDICINE

## 2017-12-22 PROCEDURE — 93010 ELECTROCARDIOGRAM REPORT: CPT | Performed by: INTERNAL MEDICINE

## 2017-12-22 PROCEDURE — 85730 THROMBOPLASTIN TIME PARTIAL: CPT

## 2017-12-22 PROCEDURE — 63710000001 INSULIN LISPRO (HUMAN) PER 5 UNITS: Performed by: NURSE PRACTITIONER

## 2017-12-22 PROCEDURE — G8997 SWALLOW GOAL STATUS: HCPCS

## 2017-12-22 PROCEDURE — 83605 ASSAY OF LACTIC ACID: CPT | Performed by: INTERNAL MEDICINE

## 2017-12-22 PROCEDURE — 70200 X-RAY EXAM OF EYE SOCKETS: CPT

## 2017-12-22 PROCEDURE — 99223 1ST HOSP IP/OBS HIGH 75: CPT | Performed by: INTERNAL MEDICINE

## 2017-12-22 PROCEDURE — 99285 EMERGENCY DEPT VISIT HI MDM: CPT

## 2017-12-22 PROCEDURE — 71010 HC CHEST PA OR AP: CPT

## 2017-12-22 RX ORDER — LEVETIRACETAM 5 MG/ML
500 INJECTION INTRAVASCULAR EVERY 12 HOURS SCHEDULED
Status: DISCONTINUED | OUTPATIENT
Start: 2017-12-23 | End: 2017-12-31

## 2017-12-22 RX ORDER — ACETAMINOPHEN 325 MG/1
650 TABLET ORAL EVERY 4 HOURS PRN
Status: DISCONTINUED | OUTPATIENT
Start: 2017-12-22 | End: 2018-01-05 | Stop reason: HOSPADM

## 2017-12-22 RX ORDER — LEVETIRACETAM 10 MG/ML
1000 INJECTION INTRAVASCULAR ONCE
Status: COMPLETED | OUTPATIENT
Start: 2017-12-22 | End: 2017-12-22

## 2017-12-22 RX ORDER — NICOTINE POLACRILEX 4 MG
15 LOZENGE BUCCAL
Status: DISCONTINUED | OUTPATIENT
Start: 2017-12-22 | End: 2018-01-05 | Stop reason: HOSPADM

## 2017-12-22 RX ORDER — DEXTROSE MONOHYDRATE 25 G/50ML
25 INJECTION, SOLUTION INTRAVENOUS
Status: DISCONTINUED | OUTPATIENT
Start: 2017-12-22 | End: 2018-01-05 | Stop reason: HOSPADM

## 2017-12-22 RX ORDER — HYDRALAZINE HYDROCHLORIDE 20 MG/ML
10 INJECTION INTRAMUSCULAR; INTRAVENOUS EVERY 6 HOURS PRN
Status: DISCONTINUED | OUTPATIENT
Start: 2017-12-22 | End: 2018-01-05 | Stop reason: HOSPADM

## 2017-12-22 RX ORDER — SODIUM CHLORIDE 0.9 % (FLUSH) 0.9 %
10 SYRINGE (ML) INJECTION AS NEEDED
Status: DISCONTINUED | OUTPATIENT
Start: 2017-12-22 | End: 2018-01-05 | Stop reason: HOSPADM

## 2017-12-22 RX ORDER — BISACODYL 10 MG
10 SUPPOSITORY, RECTAL RECTAL DAILY PRN
Status: DISCONTINUED | OUTPATIENT
Start: 2017-12-22 | End: 2018-01-05 | Stop reason: HOSPADM

## 2017-12-22 RX ORDER — SODIUM CHLORIDE 0.9 % (FLUSH) 0.9 %
1-10 SYRINGE (ML) INJECTION AS NEEDED
Status: DISCONTINUED | OUTPATIENT
Start: 2017-12-22 | End: 2018-01-05 | Stop reason: HOSPADM

## 2017-12-22 RX ADMIN — LEVETIRACETAM 1000 MG: 10 INJECTION INTRAVENOUS at 16:47

## 2017-12-23 ENCOUNTER — APPOINTMENT (OUTPATIENT)
Dept: NEUROLOGY | Facility: HOSPITAL | Age: 77
End: 2017-12-23

## 2017-12-23 LAB
ALBUMIN SERPL-MCNC: 3.3 G/DL (ref 3.2–4.8)
ALBUMIN/GLOB SERPL: 1.3 G/DL (ref 1.5–2.5)
ALP SERPL-CCNC: 62 U/L (ref 25–100)
ALT SERPL W P-5'-P-CCNC: 12 U/L (ref 7–40)
ANION GAP SERPL CALCULATED.3IONS-SCNC: 5 MMOL/L (ref 3–11)
AST SERPL-CCNC: 18 U/L (ref 0–33)
BASOPHILS # BLD AUTO: 0.04 10*3/MM3 (ref 0–0.2)
BASOPHILS NFR BLD AUTO: 0.4 % (ref 0–1)
BILIRUB SERPL-MCNC: 0.4 MG/DL (ref 0.3–1.2)
BUN BLD-MCNC: 21 MG/DL (ref 9–23)
BUN/CREAT SERPL: 19.1 (ref 7–25)
CALCIUM SPEC-SCNC: 9 MG/DL (ref 8.7–10.4)
CHLORIDE SERPL-SCNC: 104 MMOL/L (ref 99–109)
CO2 SERPL-SCNC: 28 MMOL/L (ref 20–31)
CREAT BLD-MCNC: 1.1 MG/DL (ref 0.6–1.3)
DEPRECATED RDW RBC AUTO: 46.6 FL (ref 37–54)
EOSINOPHIL # BLD AUTO: 0.1 10*3/MM3 (ref 0–0.3)
EOSINOPHIL NFR BLD AUTO: 0.9 % (ref 0–3)
ERYTHROCYTE [DISTWIDTH] IN BLOOD BY AUTOMATED COUNT: 14.5 % (ref 11.3–14.5)
GFR SERPL CREATININE-BSD FRML MDRD: 48 ML/MIN/1.73
GLOBULIN UR ELPH-MCNC: 2.5 GM/DL
GLUCOSE BLD-MCNC: 72 MG/DL (ref 70–100)
GLUCOSE BLDC GLUCOMTR-MCNC: 76 MG/DL (ref 70–130)
GLUCOSE BLDC GLUCOMTR-MCNC: 80 MG/DL (ref 70–130)
GLUCOSE BLDC GLUCOMTR-MCNC: 93 MG/DL (ref 70–130)
GLUCOSE BLDC GLUCOMTR-MCNC: 96 MG/DL (ref 70–130)
HCT VFR BLD AUTO: 37.3 % (ref 34.5–44)
HGB BLD-MCNC: 12.1 G/DL (ref 11.5–15.5)
IMM GRANULOCYTES # BLD: 0.03 10*3/MM3 (ref 0–0.03)
IMM GRANULOCYTES NFR BLD: 0.3 % (ref 0–0.6)
LYMPHOCYTES # BLD AUTO: 2.61 10*3/MM3 (ref 0.6–4.8)
LYMPHOCYTES NFR BLD AUTO: 23.6 % (ref 24–44)
MCH RBC QN AUTO: 28.6 PG (ref 27–31)
MCHC RBC AUTO-ENTMCNC: 32.4 G/DL (ref 32–36)
MCV RBC AUTO: 88.2 FL (ref 80–99)
MONOCYTES # BLD AUTO: 1.09 10*3/MM3 (ref 0–1)
MONOCYTES NFR BLD AUTO: 9.8 % (ref 0–12)
NEUTROPHILS # BLD AUTO: 7.21 10*3/MM3 (ref 1.5–8.3)
NEUTROPHILS NFR BLD AUTO: 65 % (ref 41–71)
PLATELET # BLD AUTO: 224 10*3/MM3 (ref 150–450)
PMV BLD AUTO: 11.4 FL (ref 6–12)
POTASSIUM BLD-SCNC: 4 MMOL/L (ref 3.5–5.5)
PROT SERPL-MCNC: 5.8 G/DL (ref 5.7–8.2)
RBC # BLD AUTO: 4.23 10*6/MM3 (ref 3.89–5.14)
SODIUM BLD-SCNC: 137 MMOL/L (ref 132–146)
WBC NRBC COR # BLD: 11.08 10*3/MM3 (ref 3.5–10.8)

## 2017-12-23 PROCEDURE — 85025 COMPLETE CBC W/AUTO DIFF WBC: CPT | Performed by: NURSE PRACTITIONER

## 2017-12-23 PROCEDURE — 80053 COMPREHEN METABOLIC PANEL: CPT | Performed by: NURSE PRACTITIONER

## 2017-12-23 PROCEDURE — 25010000002 LEVETIRACETAM IN NACL 0.82% 500 MG/100ML SOLUTION: Performed by: NURSE PRACTITIONER

## 2017-12-23 PROCEDURE — 99233 SBSQ HOSP IP/OBS HIGH 50: CPT | Performed by: INTERNAL MEDICINE

## 2017-12-23 PROCEDURE — 95816 EEG AWAKE AND DROWSY: CPT

## 2017-12-23 PROCEDURE — 97166 OT EVAL MOD COMPLEX 45 MIN: CPT

## 2017-12-23 PROCEDURE — 82962 GLUCOSE BLOOD TEST: CPT

## 2017-12-23 PROCEDURE — 97162 PT EVAL MOD COMPLEX 30 MIN: CPT

## 2017-12-23 RX ORDER — SODIUM CHLORIDE 9 MG/ML
50 INJECTION, SOLUTION INTRAVENOUS CONTINUOUS
Status: ACTIVE | OUTPATIENT
Start: 2017-12-23 | End: 2017-12-24

## 2017-12-23 RX ORDER — FAMOTIDINE 20 MG/1
20 TABLET, FILM COATED ORAL 2 TIMES DAILY
Status: DISCONTINUED | OUTPATIENT
Start: 2017-12-23 | End: 2018-01-03

## 2017-12-23 RX ORDER — ATORVASTATIN CALCIUM 10 MG/1
10 TABLET, FILM COATED ORAL NIGHTLY
Status: DISCONTINUED | OUTPATIENT
Start: 2017-12-23 | End: 2018-01-05 | Stop reason: HOSPADM

## 2017-12-23 RX ORDER — PREDNISONE 1 MG/1
7.5 TABLET ORAL
Status: DISCONTINUED | OUTPATIENT
Start: 2017-12-23 | End: 2018-01-05 | Stop reason: HOSPADM

## 2017-12-23 RX ORDER — AMLODIPINE BESYLATE 2.5 MG/1
2.5 TABLET ORAL DAILY
Status: DISCONTINUED | OUTPATIENT
Start: 2017-12-23 | End: 2018-01-01

## 2017-12-23 RX ORDER — DONEPEZIL HYDROCHLORIDE 10 MG/1
10 TABLET, FILM COATED ORAL NIGHTLY
Status: DISCONTINUED | OUTPATIENT
Start: 2017-12-23 | End: 2018-01-05 | Stop reason: HOSPADM

## 2017-12-23 RX ADMIN — LEVETIRACETAM 500 MG: 5 INJECTION INTRAVENOUS at 17:26

## 2017-12-23 RX ADMIN — SODIUM CHLORIDE 50 ML/HR: 9 INJECTION, SOLUTION INTRAVENOUS at 19:47

## 2017-12-23 RX ADMIN — LEVETIRACETAM 500 MG: 5 INJECTION INTRAVENOUS at 05:44

## 2017-12-24 PROBLEM — R56.9 SEIZURE (HCC): Status: ACTIVE | Noted: 2017-12-24

## 2017-12-24 PROBLEM — G93.40 ENCEPHALOPATHY: Status: ACTIVE | Noted: 2017-12-22

## 2017-12-24 LAB
GLUCOSE BLDC GLUCOMTR-MCNC: 129 MG/DL (ref 70–130)
GLUCOSE BLDC GLUCOMTR-MCNC: 131 MG/DL (ref 70–130)
GLUCOSE BLDC GLUCOMTR-MCNC: 86 MG/DL (ref 70–130)
GLUCOSE BLDC GLUCOMTR-MCNC: 86 MG/DL (ref 70–130)

## 2017-12-24 PROCEDURE — 92610 EVALUATE SWALLOWING FUNCTION: CPT

## 2017-12-24 PROCEDURE — 99233 SBSQ HOSP IP/OBS HIGH 50: CPT | Performed by: HOSPITALIST

## 2017-12-24 PROCEDURE — 25010000002 LEVETIRACETAM IN NACL 0.82% 500 MG/100ML SOLUTION: Performed by: NURSE PRACTITIONER

## 2017-12-24 PROCEDURE — 82962 GLUCOSE BLOOD TEST: CPT

## 2017-12-24 RX ORDER — SODIUM CHLORIDE 9 MG/ML
50 INJECTION, SOLUTION INTRAVENOUS CONTINUOUS
Status: ACTIVE | OUTPATIENT
Start: 2017-12-24 | End: 2017-12-25

## 2017-12-24 RX ADMIN — LEVETIRACETAM 500 MG: 5 INJECTION INTRAVENOUS at 18:14

## 2017-12-24 RX ADMIN — SODIUM CHLORIDE 50 ML/HR: 9 INJECTION, SOLUTION INTRAVENOUS at 16:55

## 2017-12-24 RX ADMIN — FAMOTIDINE 20 MG: 20 TABLET ORAL at 20:10

## 2017-12-24 RX ADMIN — AMLODIPINE BESYLATE 2.5 MG: 2.5 TABLET ORAL at 18:03

## 2017-12-24 RX ADMIN — LEVETIRACETAM 500 MG: 5 INJECTION INTRAVENOUS at 05:37

## 2017-12-24 RX ADMIN — METOPROLOL TARTRATE 12.5 MG: 25 TABLET, FILM COATED ORAL at 18:03

## 2017-12-24 RX ADMIN — METOPROLOL TARTRATE 12.5 MG: 25 TABLET, FILM COATED ORAL at 20:09

## 2017-12-24 RX ADMIN — ATORVASTATIN CALCIUM 10 MG: 10 TABLET, FILM COATED ORAL at 20:10

## 2017-12-24 RX ADMIN — DONEPEZIL HYDROCHLORIDE 10 MG: 10 TABLET, FILM COATED ORAL at 20:09

## 2017-12-25 ENCOUNTER — APPOINTMENT (OUTPATIENT)
Dept: GENERAL RADIOLOGY | Facility: HOSPITAL | Age: 77
End: 2017-12-25

## 2017-12-25 LAB
ANION GAP SERPL CALCULATED.3IONS-SCNC: 8 MMOL/L (ref 3–11)
BUN BLD-MCNC: 13 MG/DL (ref 9–23)
BUN/CREAT SERPL: 13 (ref 7–25)
CALCIUM SPEC-SCNC: 9 MG/DL (ref 8.7–10.4)
CHLORIDE SERPL-SCNC: 109 MMOL/L (ref 99–109)
CO2 SERPL-SCNC: 22 MMOL/L (ref 20–31)
CREAT BLD-MCNC: 1 MG/DL (ref 0.6–1.3)
DEPRECATED RDW RBC AUTO: 46.9 FL (ref 37–54)
ERYTHROCYTE [DISTWIDTH] IN BLOOD BY AUTOMATED COUNT: 14.4 % (ref 11.3–14.5)
GFR SERPL CREATININE-BSD FRML MDRD: 54 ML/MIN/1.73
GLUCOSE BLD-MCNC: 94 MG/DL (ref 70–100)
GLUCOSE BLDC GLUCOMTR-MCNC: 168 MG/DL (ref 70–130)
GLUCOSE BLDC GLUCOMTR-MCNC: 194 MG/DL (ref 70–130)
GLUCOSE BLDC GLUCOMTR-MCNC: 220 MG/DL (ref 70–130)
GLUCOSE BLDC GLUCOMTR-MCNC: 86 MG/DL (ref 70–130)
HCT VFR BLD AUTO: 39.8 % (ref 34.5–44)
HGB BLD-MCNC: 12.9 G/DL (ref 11.5–15.5)
MCH RBC QN AUTO: 29.1 PG (ref 27–31)
MCHC RBC AUTO-ENTMCNC: 32.4 G/DL (ref 32–36)
MCV RBC AUTO: 89.6 FL (ref 80–99)
PLATELET # BLD AUTO: 194 10*3/MM3 (ref 150–450)
PMV BLD AUTO: 11.4 FL (ref 6–12)
POTASSIUM BLD-SCNC: 4 MMOL/L (ref 3.5–5.5)
RBC # BLD AUTO: 4.44 10*6/MM3 (ref 3.89–5.14)
SODIUM BLD-SCNC: 139 MMOL/L (ref 132–146)
WBC NRBC COR # BLD: 8.12 10*3/MM3 (ref 3.5–10.8)

## 2017-12-25 PROCEDURE — 74230 X-RAY XM SWLNG FUNCJ C+: CPT

## 2017-12-25 PROCEDURE — 85027 COMPLETE CBC AUTOMATED: CPT | Performed by: HOSPITALIST

## 2017-12-25 PROCEDURE — 99231 SBSQ HOSP IP/OBS SF/LOW 25: CPT | Performed by: PSYCHIATRY & NEUROLOGY

## 2017-12-25 PROCEDURE — 92611 MOTION FLUOROSCOPY/SWALLOW: CPT

## 2017-12-25 PROCEDURE — 82962 GLUCOSE BLOOD TEST: CPT

## 2017-12-25 PROCEDURE — 63710000001 PREDNISONE PER 5 MG: Performed by: INTERNAL MEDICINE

## 2017-12-25 PROCEDURE — 25010000002 LEVETIRACETAM IN NACL 0.82% 500 MG/100ML SOLUTION: Performed by: NURSE PRACTITIONER

## 2017-12-25 PROCEDURE — 99232 SBSQ HOSP IP/OBS MODERATE 35: CPT | Performed by: HOSPITALIST

## 2017-12-25 PROCEDURE — 80048 BASIC METABOLIC PNL TOTAL CA: CPT | Performed by: HOSPITALIST

## 2017-12-25 RX ADMIN — BARIUM SULFATE 100 ML: 0.81 POWDER, FOR SUSPENSION ORAL at 09:34

## 2017-12-25 RX ADMIN — SODIUM CHLORIDE 50 ML/HR: 9 INJECTION, SOLUTION INTRAVENOUS at 10:51

## 2017-12-25 RX ADMIN — FAMOTIDINE 20 MG: 20 TABLET ORAL at 21:18

## 2017-12-25 RX ADMIN — LEVETIRACETAM 500 MG: 5 INJECTION INTRAVENOUS at 05:07

## 2017-12-25 RX ADMIN — FAMOTIDINE 20 MG: 20 TABLET ORAL at 08:34

## 2017-12-25 RX ADMIN — AMLODIPINE BESYLATE 2.5 MG: 2.5 TABLET ORAL at 08:34

## 2017-12-25 RX ADMIN — PREDNISONE 7.5 MG: 5 TABLET ORAL at 08:34

## 2017-12-25 RX ADMIN — LEVETIRACETAM 500 MG: 5 INJECTION INTRAVENOUS at 17:52

## 2017-12-25 RX ADMIN — INSULIN LISPRO 2 UNITS: 100 INJECTION, SOLUTION INTRAVENOUS; SUBCUTANEOUS at 12:17

## 2017-12-25 RX ADMIN — DONEPEZIL HYDROCHLORIDE 10 MG: 10 TABLET, FILM COATED ORAL at 21:18

## 2017-12-25 RX ADMIN — METOPROLOL TARTRATE 12.5 MG: 25 TABLET, FILM COATED ORAL at 08:34

## 2017-12-25 RX ADMIN — INSULIN LISPRO 3 UNITS: 100 INJECTION, SOLUTION INTRAVENOUS; SUBCUTANEOUS at 21:19

## 2017-12-25 RX ADMIN — METOPROLOL TARTRATE 12.5 MG: 25 TABLET, FILM COATED ORAL at 21:19

## 2017-12-25 RX ADMIN — INSULIN LISPRO 2 UNITS: 100 INJECTION, SOLUTION INTRAVENOUS; SUBCUTANEOUS at 17:54

## 2017-12-25 RX ADMIN — BARIUM SULFATE 20 ML: 400 PASTE ORAL at 09:33

## 2017-12-25 RX ADMIN — ATORVASTATIN CALCIUM 10 MG: 10 TABLET, FILM COATED ORAL at 21:18

## 2017-12-26 LAB
GLUCOSE BLDC GLUCOMTR-MCNC: 112 MG/DL (ref 70–130)
GLUCOSE BLDC GLUCOMTR-MCNC: 186 MG/DL (ref 70–130)
GLUCOSE BLDC GLUCOMTR-MCNC: 212 MG/DL (ref 70–130)
GLUCOSE BLDC GLUCOMTR-MCNC: 228 MG/DL (ref 70–130)

## 2017-12-26 PROCEDURE — 82962 GLUCOSE BLOOD TEST: CPT

## 2017-12-26 PROCEDURE — 97163 PT EVAL HIGH COMPLEX 45 MIN: CPT

## 2017-12-26 PROCEDURE — 25010000002 LEVETIRACETAM IN NACL 0.82% 500 MG/100ML SOLUTION: Performed by: NURSE PRACTITIONER

## 2017-12-26 PROCEDURE — 63710000001 PREDNISONE PER 5 MG: Performed by: INTERNAL MEDICINE

## 2017-12-26 PROCEDURE — 99232 SBSQ HOSP IP/OBS MODERATE 35: CPT | Performed by: NURSE PRACTITIONER

## 2017-12-26 PROCEDURE — 97110 THERAPEUTIC EXERCISES: CPT

## 2017-12-26 RX ADMIN — LEVETIRACETAM 500 MG: 5 INJECTION INTRAVENOUS at 18:11

## 2017-12-26 RX ADMIN — ATORVASTATIN CALCIUM 10 MG: 10 TABLET, FILM COATED ORAL at 21:16

## 2017-12-26 RX ADMIN — PREDNISONE 7.5 MG: 5 TABLET ORAL at 08:26

## 2017-12-26 RX ADMIN — LEVETIRACETAM 500 MG: 5 INJECTION INTRAVENOUS at 05:05

## 2017-12-26 RX ADMIN — INSULIN LISPRO 3 UNITS: 100 INJECTION, SOLUTION INTRAVENOUS; SUBCUTANEOUS at 11:50

## 2017-12-26 RX ADMIN — FAMOTIDINE 20 MG: 20 TABLET ORAL at 08:26

## 2017-12-26 RX ADMIN — INSULIN LISPRO 2 UNITS: 100 INJECTION, SOLUTION INTRAVENOUS; SUBCUTANEOUS at 21:18

## 2017-12-26 RX ADMIN — METOPROLOL TARTRATE 12.5 MG: 25 TABLET, FILM COATED ORAL at 21:15

## 2017-12-26 RX ADMIN — BISACODYL 10 MG: 10 SUPPOSITORY RECTAL at 15:19

## 2017-12-26 RX ADMIN — INSULIN LISPRO 3 UNITS: 100 INJECTION, SOLUTION INTRAVENOUS; SUBCUTANEOUS at 18:12

## 2017-12-26 RX ADMIN — FAMOTIDINE 20 MG: 20 TABLET ORAL at 21:16

## 2017-12-26 RX ADMIN — METOPROLOL TARTRATE 12.5 MG: 25 TABLET, FILM COATED ORAL at 08:26

## 2017-12-26 RX ADMIN — AMLODIPINE BESYLATE 2.5 MG: 2.5 TABLET ORAL at 08:25

## 2017-12-26 RX ADMIN — ACETAMINOPHEN 650 MG: 325 TABLET, FILM COATED ORAL at 21:19

## 2017-12-26 RX ADMIN — DONEPEZIL HYDROCHLORIDE 10 MG: 10 TABLET, FILM COATED ORAL at 21:16

## 2017-12-27 LAB
GLUCOSE BLDC GLUCOMTR-MCNC: 182 MG/DL (ref 70–130)
GLUCOSE BLDC GLUCOMTR-MCNC: 206 MG/DL (ref 70–130)
GLUCOSE BLDC GLUCOMTR-MCNC: 212 MG/DL (ref 70–130)
GLUCOSE BLDC GLUCOMTR-MCNC: 91 MG/DL (ref 70–130)

## 2017-12-27 PROCEDURE — 63710000001 PREDNISONE PER 5 MG: Performed by: INTERNAL MEDICINE

## 2017-12-27 PROCEDURE — 25010000002 LEVETIRACETAM IN NACL 0.82% 500 MG/100ML SOLUTION: Performed by: NURSE PRACTITIONER

## 2017-12-27 PROCEDURE — 99232 SBSQ HOSP IP/OBS MODERATE 35: CPT | Performed by: NURSE PRACTITIONER

## 2017-12-27 PROCEDURE — 82962 GLUCOSE BLOOD TEST: CPT

## 2017-12-27 RX ADMIN — INSULIN LISPRO 3 UNITS: 100 INJECTION, SOLUTION INTRAVENOUS; SUBCUTANEOUS at 17:11

## 2017-12-27 RX ADMIN — FAMOTIDINE 20 MG: 20 TABLET ORAL at 09:44

## 2017-12-27 RX ADMIN — INSULIN LISPRO 3 UNITS: 100 INJECTION, SOLUTION INTRAVENOUS; SUBCUTANEOUS at 12:31

## 2017-12-27 RX ADMIN — METOPROLOL TARTRATE 12.5 MG: 25 TABLET, FILM COATED ORAL at 09:44

## 2017-12-27 RX ADMIN — LEVETIRACETAM 500 MG: 5 INJECTION INTRAVENOUS at 17:08

## 2017-12-27 RX ADMIN — ATORVASTATIN CALCIUM 10 MG: 10 TABLET, FILM COATED ORAL at 21:09

## 2017-12-27 RX ADMIN — LEVETIRACETAM 500 MG: 5 INJECTION INTRAVENOUS at 05:35

## 2017-12-27 RX ADMIN — INSULIN LISPRO 2 UNITS: 100 INJECTION, SOLUTION INTRAVENOUS; SUBCUTANEOUS at 21:16

## 2017-12-27 RX ADMIN — PREDNISONE 7.5 MG: 5 TABLET ORAL at 09:44

## 2017-12-27 RX ADMIN — FAMOTIDINE 20 MG: 20 TABLET ORAL at 21:09

## 2017-12-27 RX ADMIN — METOPROLOL TARTRATE 12.5 MG: 25 TABLET, FILM COATED ORAL at 21:09

## 2017-12-27 RX ADMIN — DONEPEZIL HYDROCHLORIDE 10 MG: 10 TABLET, FILM COATED ORAL at 21:09

## 2017-12-27 RX ADMIN — AMLODIPINE BESYLATE 2.5 MG: 2.5 TABLET ORAL at 09:44

## 2017-12-28 ENCOUNTER — APPOINTMENT (OUTPATIENT)
Dept: GENERAL RADIOLOGY | Facility: HOSPITAL | Age: 77
End: 2017-12-28

## 2017-12-28 ENCOUNTER — APPOINTMENT (OUTPATIENT)
Dept: NUCLEAR MEDICINE | Facility: HOSPITAL | Age: 77
End: 2017-12-28

## 2017-12-28 LAB
D DIMER PPP FEU-MCNC: 9.9 MG/L (FEU) (ref 0–0.5)
GLUCOSE BLDC GLUCOMTR-MCNC: 151 MG/DL (ref 70–130)
GLUCOSE BLDC GLUCOMTR-MCNC: 178 MG/DL (ref 70–130)
GLUCOSE BLDC GLUCOMTR-MCNC: 266 MG/DL (ref 70–130)
GLUCOSE BLDC GLUCOMTR-MCNC: 90 MG/DL (ref 70–130)
TROPONIN I SERPL-MCNC: 0.03 NG/ML

## 2017-12-28 PROCEDURE — 93005 ELECTROCARDIOGRAM TRACING: CPT | Performed by: FAMILY MEDICINE

## 2017-12-28 PROCEDURE — 85379 FIBRIN DEGRADATION QUANT: CPT | Performed by: FAMILY MEDICINE

## 2017-12-28 PROCEDURE — 97110 THERAPEUTIC EXERCISES: CPT

## 2017-12-28 PROCEDURE — A9558 XE133 XENON 10MCI: HCPCS | Performed by: FAMILY MEDICINE

## 2017-12-28 PROCEDURE — 99233 SBSQ HOSP IP/OBS HIGH 50: CPT | Performed by: FAMILY MEDICINE

## 2017-12-28 PROCEDURE — 71010 HC CHEST PA OR AP: CPT

## 2017-12-28 PROCEDURE — 63710000001 PREDNISONE PER 5 MG: Performed by: INTERNAL MEDICINE

## 2017-12-28 PROCEDURE — 25010000002 LEVETIRACETAM IN NACL 0.82% 500 MG/100ML SOLUTION: Performed by: NURSE PRACTITIONER

## 2017-12-28 PROCEDURE — 82962 GLUCOSE BLOOD TEST: CPT

## 2017-12-28 PROCEDURE — 0 TECHNETIUM ALBUMIN AGGREGATED: Performed by: FAMILY MEDICINE

## 2017-12-28 PROCEDURE — 0 XENON XE 133: Performed by: FAMILY MEDICINE

## 2017-12-28 PROCEDURE — 93010 ELECTROCARDIOGRAM REPORT: CPT | Performed by: INTERNAL MEDICINE

## 2017-12-28 PROCEDURE — 84484 ASSAY OF TROPONIN QUANT: CPT | Performed by: FAMILY MEDICINE

## 2017-12-28 PROCEDURE — A9540 TC99M MAA: HCPCS | Performed by: FAMILY MEDICINE

## 2017-12-28 PROCEDURE — 78582 LUNG VENTILAT&PERFUS IMAGING: CPT

## 2017-12-28 RX ADMIN — METOPROLOL TARTRATE 12.5 MG: 25 TABLET, FILM COATED ORAL at 21:04

## 2017-12-28 RX ADMIN — INSULIN LISPRO 4 UNITS: 100 INJECTION, SOLUTION INTRAVENOUS; SUBCUTANEOUS at 12:24

## 2017-12-28 RX ADMIN — ATORVASTATIN CALCIUM 10 MG: 10 TABLET, FILM COATED ORAL at 21:04

## 2017-12-28 RX ADMIN — Medication 1 DOSE: at 16:57

## 2017-12-28 RX ADMIN — LEVETIRACETAM 500 MG: 5 INJECTION INTRAVENOUS at 18:21

## 2017-12-28 RX ADMIN — PREDNISONE 7.5 MG: 5 TABLET ORAL at 08:21

## 2017-12-28 RX ADMIN — INSULIN LISPRO 2 UNITS: 100 INJECTION, SOLUTION INTRAVENOUS; SUBCUTANEOUS at 18:21

## 2017-12-28 RX ADMIN — METOPROLOL TARTRATE 12.5 MG: 25 TABLET, FILM COATED ORAL at 08:21

## 2017-12-28 RX ADMIN — FAMOTIDINE 20 MG: 20 TABLET ORAL at 21:04

## 2017-12-28 RX ADMIN — INSULIN LISPRO 2 UNITS: 100 INJECTION, SOLUTION INTRAVENOUS; SUBCUTANEOUS at 21:08

## 2017-12-28 RX ADMIN — AMLODIPINE BESYLATE 2.5 MG: 2.5 TABLET ORAL at 08:22

## 2017-12-28 RX ADMIN — DONEPEZIL HYDROCHLORIDE 10 MG: 10 TABLET, FILM COATED ORAL at 21:04

## 2017-12-28 RX ADMIN — XENON XE-133 17.92 MILLICURIE: 10 GAS RESPIRATORY (INHALATION) at 16:48

## 2017-12-28 RX ADMIN — LEVETIRACETAM 500 MG: 5 INJECTION INTRAVENOUS at 05:51

## 2017-12-28 RX ADMIN — FAMOTIDINE 20 MG: 20 TABLET ORAL at 08:22

## 2017-12-29 ENCOUNTER — APPOINTMENT (OUTPATIENT)
Dept: CT IMAGING | Facility: HOSPITAL | Age: 77
End: 2017-12-29

## 2017-12-29 ENCOUNTER — APPOINTMENT (OUTPATIENT)
Dept: CARDIOLOGY | Facility: HOSPITAL | Age: 77
End: 2017-12-29
Attending: FAMILY MEDICINE

## 2017-12-29 PROBLEM — I26.99 OTHER PULMONARY EMBOLISM WITHOUT ACUTE COR PULMONALE (HCC): Status: ACTIVE | Noted: 2017-12-29

## 2017-12-29 LAB
APTT PPP: 24.6 SECONDS (ref 45–60)
BH CV LOWER VASCULAR LEFT COMMON FEMORAL AUGMENT: NORMAL
BH CV LOWER VASCULAR LEFT COMMON FEMORAL COMPRESS: NORMAL
BH CV LOWER VASCULAR LEFT COMMON FEMORAL PHASIC: NORMAL
BH CV LOWER VASCULAR LEFT COMMON FEMORAL SPONT: NORMAL
BH CV LOWER VASCULAR LEFT DISTAL FEMORAL COMPRESS: NORMAL
BH CV LOWER VASCULAR LEFT GASTRONEMIUS COMPRESS: NORMAL
BH CV LOWER VASCULAR LEFT GREATER SAPH AK COMPRESS: NORMAL
BH CV LOWER VASCULAR LEFT GREATER SAPH BK COMPRESS: NORMAL
BH CV LOWER VASCULAR LEFT LESSER SAPH COMPRESS: NORMAL
BH CV LOWER VASCULAR LEFT MID FEMORAL AUGMENT: NORMAL
BH CV LOWER VASCULAR LEFT MID FEMORAL COMPRESS: NORMAL
BH CV LOWER VASCULAR LEFT MID FEMORAL PHASIC: NORMAL
BH CV LOWER VASCULAR LEFT MID FEMORAL SPONT: NORMAL
BH CV LOWER VASCULAR LEFT PERONEAL COMPRESS: NORMAL
BH CV LOWER VASCULAR LEFT POPLITEAL AUGMENT: NORMAL
BH CV LOWER VASCULAR LEFT POPLITEAL COMPRESS: NORMAL
BH CV LOWER VASCULAR LEFT POPLITEAL PHASIC: NORMAL
BH CV LOWER VASCULAR LEFT POPLITEAL SPONT: NORMAL
BH CV LOWER VASCULAR LEFT POSTERIOR TIBIAL COMPRESS: NORMAL
BH CV LOWER VASCULAR LEFT PROFUNDA FEMORAL AUGMENT: NORMAL
BH CV LOWER VASCULAR LEFT PROFUNDA FEMORAL COMPRESS: NORMAL
BH CV LOWER VASCULAR LEFT PROFUNDA FEMORAL PHASIC: NORMAL
BH CV LOWER VASCULAR LEFT PROFUNDA FEMORAL SPONT: NORMAL
BH CV LOWER VASCULAR LEFT PROXIMAL FEMORAL COMPRESS: NORMAL
BH CV LOWER VASCULAR LEFT SAPHENOFEMORAL JUNCTION AUGMENT: NORMAL
BH CV LOWER VASCULAR LEFT SAPHENOFEMORAL JUNCTION COMPRESS: NORMAL
BH CV LOWER VASCULAR LEFT SAPHENOFEMORAL JUNCTION PHASIC: NORMAL
BH CV LOWER VASCULAR LEFT SAPHENOFEMORAL JUNCTION SPONT: NORMAL
BH CV LOWER VASCULAR RIGHT COMMON FEMORAL AUGMENT: NORMAL
BH CV LOWER VASCULAR RIGHT COMMON FEMORAL COMPRESS: NORMAL
BH CV LOWER VASCULAR RIGHT COMMON FEMORAL PHASIC: NORMAL
BH CV LOWER VASCULAR RIGHT COMMON FEMORAL SPONT: NORMAL
BH CV LOWER VASCULAR RIGHT DISTAL FEMORAL COMPRESS: NORMAL
BH CV LOWER VASCULAR RIGHT GASTRONEMIUS COMPRESS: NORMAL
BH CV LOWER VASCULAR RIGHT GREATER SAPH AK COMPRESS: NORMAL
BH CV LOWER VASCULAR RIGHT GREATER SAPH BK COMPRESS: NORMAL
BH CV LOWER VASCULAR RIGHT LESSER SAPH COMPRESS: NORMAL
BH CV LOWER VASCULAR RIGHT MID FEMORAL AUGMENT: NORMAL
BH CV LOWER VASCULAR RIGHT MID FEMORAL COMPRESS: NORMAL
BH CV LOWER VASCULAR RIGHT MID FEMORAL PHASIC: NORMAL
BH CV LOWER VASCULAR RIGHT MID FEMORAL SPONT: NORMAL
BH CV LOWER VASCULAR RIGHT PERONEAL COMPRESS: NORMAL
BH CV LOWER VASCULAR RIGHT POPLITEAL AUGMENT: NORMAL
BH CV LOWER VASCULAR RIGHT POPLITEAL COMPRESS: NORMAL
BH CV LOWER VASCULAR RIGHT POPLITEAL PHASIC: NORMAL
BH CV LOWER VASCULAR RIGHT POPLITEAL SPONT: NORMAL
BH CV LOWER VASCULAR RIGHT POSTERIOR TIBIAL COMPRESS: NORMAL
BH CV LOWER VASCULAR RIGHT PROFUNDA FEMORAL COMPRESS: NORMAL
BH CV LOWER VASCULAR RIGHT PROXIMAL FEMORAL COMPRESS: NORMAL
BH CV LOWER VASCULAR RIGHT SAPHENOFEMORAL JUNCTION AUGMENT: NORMAL
BH CV LOWER VASCULAR RIGHT SAPHENOFEMORAL JUNCTION COMPRESS: NORMAL
BH CV LOWER VASCULAR RIGHT SAPHENOFEMORAL JUNCTION PHASIC: NORMAL
BH CV LOWER VASCULAR RIGHT SAPHENOFEMORAL JUNCTION SPONT: NORMAL
GLUCOSE BLDC GLUCOMTR-MCNC: 163 MG/DL (ref 70–130)
GLUCOSE BLDC GLUCOMTR-MCNC: 200 MG/DL (ref 70–130)
GLUCOSE BLDC GLUCOMTR-MCNC: 91 MG/DL (ref 70–130)

## 2017-12-29 PROCEDURE — 93970 EXTREMITY STUDY: CPT | Performed by: INTERNAL MEDICINE

## 2017-12-29 PROCEDURE — 25010000002 LEVETIRACETAM IN NACL 0.82% 500 MG/100ML SOLUTION: Performed by: NURSE PRACTITIONER

## 2017-12-29 PROCEDURE — 71275 CT ANGIOGRAPHY CHEST: CPT

## 2017-12-29 PROCEDURE — 85730 THROMBOPLASTIN TIME PARTIAL: CPT | Performed by: FAMILY MEDICINE

## 2017-12-29 PROCEDURE — 92526 ORAL FUNCTION THERAPY: CPT

## 2017-12-29 PROCEDURE — 82962 GLUCOSE BLOOD TEST: CPT

## 2017-12-29 PROCEDURE — 25010000002 HEPARIN (PORCINE) PER 1000 UNITS: Performed by: FAMILY MEDICINE

## 2017-12-29 PROCEDURE — 70450 CT HEAD/BRAIN W/O DYE: CPT

## 2017-12-29 PROCEDURE — 0 IOPAMIDOL PER 1 ML: Performed by: FAMILY MEDICINE

## 2017-12-29 PROCEDURE — 99233 SBSQ HOSP IP/OBS HIGH 50: CPT | Performed by: FAMILY MEDICINE

## 2017-12-29 PROCEDURE — 63710000001 PREDNISONE PER 5 MG: Performed by: INTERNAL MEDICINE

## 2017-12-29 PROCEDURE — 99223 1ST HOSP IP/OBS HIGH 75: CPT | Performed by: INTERNAL MEDICINE

## 2017-12-29 PROCEDURE — 93970 EXTREMITY STUDY: CPT

## 2017-12-29 RX ADMIN — LEVETIRACETAM 500 MG: 5 INJECTION INTRAVENOUS at 17:28

## 2017-12-29 RX ADMIN — PREDNISONE 7.5 MG: 5 TABLET ORAL at 08:25

## 2017-12-29 RX ADMIN — FAMOTIDINE 20 MG: 20 TABLET ORAL at 20:15

## 2017-12-29 RX ADMIN — FAMOTIDINE 20 MG: 20 TABLET ORAL at 08:25

## 2017-12-29 RX ADMIN — HEPARIN SODIUM 18 UNITS/KG/HR: 10000 INJECTION, SOLUTION INTRAVENOUS at 22:42

## 2017-12-29 RX ADMIN — METOPROLOL TARTRATE 12.5 MG: 25 TABLET, FILM COATED ORAL at 20:15

## 2017-12-29 RX ADMIN — INSULIN LISPRO 2 UNITS: 100 INJECTION, SOLUTION INTRAVENOUS; SUBCUTANEOUS at 12:28

## 2017-12-29 RX ADMIN — LEVETIRACETAM 500 MG: 5 INJECTION INTRAVENOUS at 06:05

## 2017-12-29 RX ADMIN — INSULIN LISPRO 3 UNITS: 100 INJECTION, SOLUTION INTRAVENOUS; SUBCUTANEOUS at 17:28

## 2017-12-29 RX ADMIN — ATORVASTATIN CALCIUM 10 MG: 10 TABLET, FILM COATED ORAL at 20:14

## 2017-12-29 RX ADMIN — METOPROLOL TARTRATE 12.5 MG: 25 TABLET, FILM COATED ORAL at 08:25

## 2017-12-29 RX ADMIN — IOPAMIDOL 80 ML: 755 INJECTION, SOLUTION INTRAVENOUS at 10:22

## 2017-12-29 RX ADMIN — DONEPEZIL HYDROCHLORIDE 10 MG: 10 TABLET, FILM COATED ORAL at 20:15

## 2017-12-29 RX ADMIN — AMLODIPINE BESYLATE 2.5 MG: 2.5 TABLET ORAL at 08:25

## 2017-12-30 LAB
APTT PPP: 157.8 SECONDS (ref 45–60)
APTT PPP: 36.4 SECONDS (ref 45–60)
APTT PPP: 69.2 SECONDS (ref 45–60)
APTT PPP: 72.1 SECONDS (ref 45–60)
GLUCOSE BLDC GLUCOMTR-MCNC: 122 MG/DL (ref 70–130)
GLUCOSE BLDC GLUCOMTR-MCNC: 136 MG/DL (ref 70–130)
GLUCOSE BLDC GLUCOMTR-MCNC: 179 MG/DL (ref 70–130)
GLUCOSE BLDC GLUCOMTR-MCNC: 193 MG/DL (ref 70–130)

## 2017-12-30 PROCEDURE — 82962 GLUCOSE BLOOD TEST: CPT

## 2017-12-30 PROCEDURE — 85730 THROMBOPLASTIN TIME PARTIAL: CPT

## 2017-12-30 PROCEDURE — 99232 SBSQ HOSP IP/OBS MODERATE 35: CPT | Performed by: INTERNAL MEDICINE

## 2017-12-30 PROCEDURE — 63710000001 PREDNISONE PER 5 MG: Performed by: INTERNAL MEDICINE

## 2017-12-30 PROCEDURE — 97110 THERAPEUTIC EXERCISES: CPT

## 2017-12-30 PROCEDURE — 25010000002 HEPARIN (PORCINE) PER 1000 UNITS

## 2017-12-30 PROCEDURE — 25010000002 LEVETIRACETAM IN NACL 0.82% 500 MG/100ML SOLUTION: Performed by: NURSE PRACTITIONER

## 2017-12-30 RX ADMIN — ATORVASTATIN CALCIUM 10 MG: 10 TABLET, FILM COATED ORAL at 20:10

## 2017-12-30 RX ADMIN — METOPROLOL TARTRATE 12.5 MG: 25 TABLET, FILM COATED ORAL at 20:10

## 2017-12-30 RX ADMIN — METOPROLOL TARTRATE 12.5 MG: 25 TABLET, FILM COATED ORAL at 08:33

## 2017-12-30 RX ADMIN — AMLODIPINE BESYLATE 2.5 MG: 2.5 TABLET ORAL at 08:34

## 2017-12-30 RX ADMIN — INSULIN LISPRO 2 UNITS: 100 INJECTION, SOLUTION INTRAVENOUS; SUBCUTANEOUS at 18:05

## 2017-12-30 RX ADMIN — LEVETIRACETAM 500 MG: 5 INJECTION INTRAVENOUS at 06:08

## 2017-12-30 RX ADMIN — DONEPEZIL HYDROCHLORIDE 10 MG: 10 TABLET, FILM COATED ORAL at 20:10

## 2017-12-30 RX ADMIN — LEVETIRACETAM 500 MG: 5 INJECTION INTRAVENOUS at 18:05

## 2017-12-30 RX ADMIN — PREDNISONE 7.5 MG: 5 TABLET ORAL at 08:33

## 2017-12-30 RX ADMIN — HEPARIN SODIUM 14 UNITS/KG/HR: 10000 INJECTION, SOLUTION INTRAVENOUS at 20:35

## 2017-12-30 RX ADMIN — INSULIN LISPRO 2 UNITS: 100 INJECTION, SOLUTION INTRAVENOUS; SUBCUTANEOUS at 11:49

## 2017-12-30 RX ADMIN — ACETAMINOPHEN 650 MG: 325 TABLET, FILM COATED ORAL at 13:07

## 2017-12-30 RX ADMIN — FAMOTIDINE 20 MG: 20 TABLET ORAL at 20:10

## 2017-12-30 RX ADMIN — FAMOTIDINE 20 MG: 20 TABLET ORAL at 08:34

## 2017-12-31 ENCOUNTER — APPOINTMENT (OUTPATIENT)
Dept: CT IMAGING | Facility: HOSPITAL | Age: 77
End: 2017-12-31

## 2017-12-31 LAB
ANION GAP SERPL CALCULATED.3IONS-SCNC: 7 MMOL/L (ref 3–11)
APTT PPP: 45.9 SECONDS (ref 45–60)
APTT PPP: 55.2 SECONDS (ref 45–60)
APTT PPP: 68.3 SECONDS (ref 45–60)
BASOPHILS # BLD AUTO: 0.03 10*3/MM3 (ref 0–0.2)
BASOPHILS NFR BLD AUTO: 0.4 % (ref 0–1)
BUN BLD-MCNC: 19 MG/DL (ref 9–23)
BUN/CREAT SERPL: 17.3 (ref 7–25)
CALCIUM SPEC-SCNC: 8.9 MG/DL (ref 8.7–10.4)
CHLORIDE SERPL-SCNC: 104 MMOL/L (ref 99–109)
CO2 SERPL-SCNC: 27 MMOL/L (ref 20–31)
CREAT BLD-MCNC: 1.1 MG/DL (ref 0.6–1.3)
DEPRECATED RDW RBC AUTO: 45.8 FL (ref 37–54)
EOSINOPHIL # BLD AUTO: 0.06 10*3/MM3 (ref 0–0.3)
EOSINOPHIL NFR BLD AUTO: 0.8 % (ref 0–3)
ERYTHROCYTE [DISTWIDTH] IN BLOOD BY AUTOMATED COUNT: 14.5 % (ref 11.3–14.5)
GFR SERPL CREATININE-BSD FRML MDRD: 48 ML/MIN/1.73
GLUCOSE BLD-MCNC: 84 MG/DL (ref 70–100)
GLUCOSE BLDC GLUCOMTR-MCNC: 135 MG/DL (ref 70–130)
GLUCOSE BLDC GLUCOMTR-MCNC: 195 MG/DL (ref 70–130)
GLUCOSE BLDC GLUCOMTR-MCNC: 90 MG/DL (ref 70–130)
GLUCOSE BLDC GLUCOMTR-MCNC: 97 MG/DL (ref 70–130)
HCT VFR BLD AUTO: 36.1 % (ref 34.5–44)
HGB BLD-MCNC: 12 G/DL (ref 11.5–15.5)
IMM GRANULOCYTES # BLD: 0.02 10*3/MM3 (ref 0–0.03)
IMM GRANULOCYTES NFR BLD: 0.3 % (ref 0–0.6)
LYMPHOCYTES # BLD AUTO: 2.61 10*3/MM3 (ref 0.6–4.8)
LYMPHOCYTES NFR BLD AUTO: 33.6 % (ref 24–44)
MCH RBC QN AUTO: 29 PG (ref 27–31)
MCHC RBC AUTO-ENTMCNC: 33.2 G/DL (ref 32–36)
MCV RBC AUTO: 87.2 FL (ref 80–99)
MONOCYTES # BLD AUTO: 0.67 10*3/MM3 (ref 0–1)
MONOCYTES NFR BLD AUTO: 8.6 % (ref 0–12)
NEUTROPHILS # BLD AUTO: 4.38 10*3/MM3 (ref 1.5–8.3)
NEUTROPHILS NFR BLD AUTO: 56.3 % (ref 41–71)
PLATELET # BLD AUTO: 201 10*3/MM3 (ref 150–450)
PMV BLD AUTO: 11.3 FL (ref 6–12)
POTASSIUM BLD-SCNC: 3.4 MMOL/L (ref 3.5–5.5)
POTASSIUM BLD-SCNC: 4.7 MMOL/L (ref 3.5–5.5)
RBC # BLD AUTO: 4.14 10*6/MM3 (ref 3.89–5.14)
SODIUM BLD-SCNC: 138 MMOL/L (ref 132–146)
WBC NRBC COR # BLD: 7.77 10*3/MM3 (ref 3.5–10.8)

## 2017-12-31 PROCEDURE — 25010000002 LEVETIRACETAM IN NACL 0.82% 500 MG/100ML SOLUTION: Performed by: NURSE PRACTITIONER

## 2017-12-31 PROCEDURE — 80048 BASIC METABOLIC PNL TOTAL CA: CPT | Performed by: INTERNAL MEDICINE

## 2017-12-31 PROCEDURE — 99233 SBSQ HOSP IP/OBS HIGH 50: CPT | Performed by: INTERNAL MEDICINE

## 2017-12-31 PROCEDURE — 63710000001 PREDNISONE PER 5 MG: Performed by: INTERNAL MEDICINE

## 2017-12-31 PROCEDURE — 84132 ASSAY OF SERUM POTASSIUM: CPT | Performed by: INTERNAL MEDICINE

## 2017-12-31 PROCEDURE — 82962 GLUCOSE BLOOD TEST: CPT

## 2017-12-31 PROCEDURE — 99232 SBSQ HOSP IP/OBS MODERATE 35: CPT | Performed by: INTERNAL MEDICINE

## 2017-12-31 PROCEDURE — 85730 THROMBOPLASTIN TIME PARTIAL: CPT

## 2017-12-31 PROCEDURE — 85025 COMPLETE CBC W/AUTO DIFF WBC: CPT | Performed by: INTERNAL MEDICINE

## 2017-12-31 PROCEDURE — 70450 CT HEAD/BRAIN W/O DYE: CPT

## 2017-12-31 RX ORDER — LEVETIRACETAM 500 MG/1
500 TABLET ORAL EVERY 12 HOURS SCHEDULED
Status: DISCONTINUED | OUTPATIENT
Start: 2017-12-31 | End: 2018-01-05 | Stop reason: HOSPADM

## 2017-12-31 RX ORDER — POTASSIUM CHLORIDE 1.5 G/1.77G
40 POWDER, FOR SOLUTION ORAL AS NEEDED
Status: DISCONTINUED | OUTPATIENT
Start: 2017-12-31 | End: 2018-01-05 | Stop reason: HOSPADM

## 2017-12-31 RX ORDER — POTASSIUM CHLORIDE 750 MG/1
40 CAPSULE, EXTENDED RELEASE ORAL AS NEEDED
Status: DISCONTINUED | OUTPATIENT
Start: 2017-12-31 | End: 2018-01-05 | Stop reason: HOSPADM

## 2017-12-31 RX ADMIN — LEVETIRACETAM 500 MG: 500 TABLET, FILM COATED ORAL at 20:54

## 2017-12-31 RX ADMIN — FAMOTIDINE 20 MG: 20 TABLET ORAL at 08:46

## 2017-12-31 RX ADMIN — POTASSIUM CHLORIDE 40 MEQ: 1.5 POWDER, FOR SOLUTION ORAL at 10:02

## 2017-12-31 RX ADMIN — ATORVASTATIN CALCIUM 10 MG: 10 TABLET, FILM COATED ORAL at 20:54

## 2017-12-31 RX ADMIN — METOPROLOL TARTRATE 12.5 MG: 25 TABLET, FILM COATED ORAL at 08:46

## 2017-12-31 RX ADMIN — METOPROLOL TARTRATE 12.5 MG: 25 TABLET, FILM COATED ORAL at 20:54

## 2017-12-31 RX ADMIN — INSULIN LISPRO 2 UNITS: 100 INJECTION, SOLUTION INTRAVENOUS; SUBCUTANEOUS at 17:37

## 2017-12-31 RX ADMIN — POTASSIUM CHLORIDE 40 MEQ: 1.5 POWDER, FOR SOLUTION ORAL at 15:43

## 2017-12-31 RX ADMIN — LEVETIRACETAM 500 MG: 5 INJECTION INTRAVENOUS at 05:46

## 2017-12-31 RX ADMIN — AMLODIPINE BESYLATE 2.5 MG: 2.5 TABLET ORAL at 08:46

## 2017-12-31 RX ADMIN — PREDNISONE 7.5 MG: 5 TABLET ORAL at 08:46

## 2017-12-31 RX ADMIN — DONEPEZIL HYDROCHLORIDE 10 MG: 10 TABLET, FILM COATED ORAL at 20:54

## 2017-12-31 RX ADMIN — FAMOTIDINE 20 MG: 20 TABLET ORAL at 20:54

## 2018-01-01 LAB
APTT PPP: 41.9 SECONDS (ref 45–60)
APTT PPP: 42.7 SECONDS (ref 45–60)
APTT PPP: 52.7 SECONDS (ref 45–60)
APTT PPP: 61.5 SECONDS (ref 45–60)
GLUCOSE BLDC GLUCOMTR-MCNC: 195 MG/DL (ref 70–130)
GLUCOSE BLDC GLUCOMTR-MCNC: 235 MG/DL (ref 70–130)
GLUCOSE BLDC GLUCOMTR-MCNC: 249 MG/DL (ref 70–130)
GLUCOSE BLDC GLUCOMTR-MCNC: 85 MG/DL (ref 70–130)
INR PPP: 1.07
PROTHROMBIN TIME: 11.7 SECONDS (ref 9.6–11.5)

## 2018-01-01 PROCEDURE — 25010000002 HEPARIN (PORCINE) PER 1000 UNITS

## 2018-01-01 PROCEDURE — 63710000001 PREDNISONE PER 5 MG: Performed by: INTERNAL MEDICINE

## 2018-01-01 PROCEDURE — 85730 THROMBOPLASTIN TIME PARTIAL: CPT

## 2018-01-01 PROCEDURE — 82962 GLUCOSE BLOOD TEST: CPT

## 2018-01-01 PROCEDURE — 99231 SBSQ HOSP IP/OBS SF/LOW 25: CPT | Performed by: INTERNAL MEDICINE

## 2018-01-01 PROCEDURE — 99233 SBSQ HOSP IP/OBS HIGH 50: CPT | Performed by: INTERNAL MEDICINE

## 2018-01-01 PROCEDURE — 85610 PROTHROMBIN TIME: CPT | Performed by: INTERNAL MEDICINE

## 2018-01-01 RX ORDER — WARFARIN SODIUM 2.5 MG/1
2.5 TABLET ORAL
Status: DISCONTINUED | OUTPATIENT
Start: 2018-01-01 | End: 2018-01-04

## 2018-01-01 RX ORDER — AMLODIPINE BESYLATE 5 MG/1
5 TABLET ORAL DAILY
Status: DISCONTINUED | OUTPATIENT
Start: 2018-01-01 | End: 2018-01-05 | Stop reason: HOSPADM

## 2018-01-01 RX ADMIN — INSULIN LISPRO 4 UNITS: 100 INJECTION, SOLUTION INTRAVENOUS; SUBCUTANEOUS at 20:54

## 2018-01-01 RX ADMIN — INSULIN LISPRO 3 UNITS: 100 INJECTION, SOLUTION INTRAVENOUS; SUBCUTANEOUS at 17:54

## 2018-01-01 RX ADMIN — INSULIN LISPRO 2 UNITS: 100 INJECTION, SOLUTION INTRAVENOUS; SUBCUTANEOUS at 13:01

## 2018-01-01 RX ADMIN — ATORVASTATIN CALCIUM 10 MG: 10 TABLET, FILM COATED ORAL at 20:46

## 2018-01-01 RX ADMIN — METOPROLOL TARTRATE 12.5 MG: 25 TABLET, FILM COATED ORAL at 20:47

## 2018-01-01 RX ADMIN — ACETAMINOPHEN 650 MG: 325 TABLET, FILM COATED ORAL at 14:32

## 2018-01-01 RX ADMIN — WARFARIN SODIUM 2.5 MG: 2.5 TABLET ORAL at 17:54

## 2018-01-01 RX ADMIN — LEVETIRACETAM 500 MG: 500 TABLET, FILM COATED ORAL at 20:46

## 2018-01-01 RX ADMIN — FAMOTIDINE 20 MG: 20 TABLET ORAL at 20:46

## 2018-01-01 RX ADMIN — FAMOTIDINE 20 MG: 20 TABLET ORAL at 09:43

## 2018-01-01 RX ADMIN — AMLODIPINE BESYLATE 5 MG: 5 TABLET ORAL at 09:47

## 2018-01-01 RX ADMIN — DONEPEZIL HYDROCHLORIDE 10 MG: 10 TABLET, FILM COATED ORAL at 20:46

## 2018-01-01 RX ADMIN — LEVETIRACETAM 500 MG: 500 TABLET, FILM COATED ORAL at 09:43

## 2018-01-01 RX ADMIN — HEPARIN SODIUM 9 UNITS/KG/HR: 10000 INJECTION, SOLUTION INTRAVENOUS at 05:45

## 2018-01-01 RX ADMIN — PREDNISONE 7.5 MG: 5 TABLET ORAL at 09:47

## 2018-01-01 RX ADMIN — METOPROLOL TARTRATE 12.5 MG: 25 TABLET, FILM COATED ORAL at 09:42

## 2018-01-01 NOTE — PROGRESS NOTES
" [facility] - Progress Note    Lois Brennan  1940  S461/1    01/01/18, 9:32 AM  Chief Complaint: Following for HTN, Remote AFib    Subjective:   Fatigued, sad.  Denies chest pain or dyspnea.  Has poor appetite.    Review of Systems:  Pertinent positives are listed above and in physical exam.  All others have been reviewed and are negative.      amLODIPine 2.5 mg Oral Daily   atorvastatin 10 mg Oral Nightly   donepezil 10 mg Oral Nightly   famotidine 20 mg Oral BID   insulin lispro 0-7 Units Subcutaneous 4x Daily With Meals & Nightly   levETIRAcetam 500 mg Oral Q12H   metoprolol tartrate 12.5 mg Oral Q12H   predniSONE 7.5 mg Oral Daily With Breakfast       Objective:  Vitals:   height is 157.5 cm (62\") and weight is 61.4 kg (135 lb 6.4 oz). Her oral temperature is 97.6 °F (36.4 °C). Her blood pressure is 139/74 and her pulse is 71. Her respiration is 20 and oxygen saturation is 99%.     Intake/Output Summary (Last 24 hours) at 01/01/18 0932  Last data filed at 12/31/17 1740   Gross per 24 hour   Intake           305.52 ml   Output                0 ml   Net           305.52 ml       Physical Exam:  CONSTITUTIONAL: No acute distress, sad  RESPIRATORY: Normal effort. Clear to auscultation bilaterally without wheezing or rales  CARDIOVASCULAR: Regular rate and rhythm with normal S1 and S2. Without murmur, gallop or rub.  PERIPHERAL VASCULAR: Normal radial pulses bilaterally. There is no peripheral edema bilaterally.       Results from last 7 days  Lab Units 12/31/17  0514   WBC 10*3/mm3 7.77   HEMOGLOBIN g/dL 12.0   HEMATOCRIT % 36.1   PLATELETS 10*3/mm3 201       Results from last 7 days  Lab Units 12/31/17 2002 12/31/17  0514   SODIUM mmol/L  --  138   POTASSIUM mmol/L 4.7 3.4*   CHLORIDE mmol/L  --  104   CO2 mmol/L  --  27.0   BUN mg/dL  --  19   CREATININE mg/dL  --  1.10   CALCIUM mg/dL  --  8.9   GLUCOSE mg/dL  --  84                       Tele:  NSR    Assessment:  1. " Questionable Paroxysmal atrial fibrillation:    -  In normal sinus rhythm.  Amiodarone therapy discontinued 12/29 as there has been no documented atrial fibrillation seen in the chart per EKG or telemetry   - Orders placed to obtain EKG or print telemetry strip if atrial fibrillation seen per monitor    2. Remote CVA/ICH:    -  On IV heparin with no change in clinical status   - Neurosurgery following    3. PEs:    -  On intravenous heparin since 12/29   - Pulmonology following    4. Essential HTN.    -  BP elevated overnight consistently.    Plan:  -Increase amlodipine to 5 mg daily for hypertension  -Will follow on an as needed basis; please fell free to call with any questions or concerns    I discussed the patients findings and my recommendations with the patient, any present family members, and the nursing staff.  Remberto Sanders MD saw and examined patient, verified hx and PE, read all radiographic studies, reviewed labs and micro data, and formulated dx, plan for treatment and all medical decision making.      Nyla Lopez PA-C  01/01/18, 9:32 AM    Remberto Sanders MD, MSc, [facility]  Interventional Cardiology  [facility]

## 2018-01-01 NOTE — PLAN OF CARE
Problem: Patient Care Overview (Adult)  Goal: Plan of Care Review  Outcome: Ongoing (interventions implemented as appropriate)    Goal: Adult Individualization and Mutuality  Outcome: Ongoing (interventions implemented as appropriate)    Goal: Discharge Needs Assessment  Outcome: Ongoing (interventions implemented as appropriate)      Problem: Fall Risk (Adult)  Goal: Identify Related Risk Factors and Signs and Symptoms  Outcome: Ongoing (interventions implemented as appropriate)    Goal: Absence of Falls  Outcome: Ongoing (interventions implemented as appropriate)      Problem: Pressure Ulcer Risk (Kranthi Scale) (Adult,Obstetrics,Pediatric)  Goal: Identify Related Risk Factors and Signs and Symptoms  Outcome: Ongoing (interventions implemented as appropriate)    Goal: Skin Integrity  Outcome: Ongoing (interventions implemented as appropriate)      Problem: Diabetes, Type 2 (Adult)  Goal: Signs and Symptoms of Listed Potential Problems Will be Absent or Manageable (Diabetes, Type 2)  Outcome: Ongoing (interventions implemented as appropriate)      Problem: Confusion, Acute (Adult)  Goal: Identify Related Risk Factors and Signs and Symptoms  Outcome: Ongoing (interventions implemented as appropriate)    Goal: Cognitive/Functional Impairments Minimized  Outcome: Ongoing (interventions implemented as appropriate)    Goal: Safety  Outcome: Ongoing (interventions implemented as appropriate)      Problem: Skin Integrity Impairment, Risk/Actual (Adult)  Goal: Identify Related Risk Factors and Signs and Symptoms  Outcome: Ongoing (interventions implemented as appropriate)    Goal: Skin Integrity/Wound Healing  Outcome: Ongoing (interventions implemented as appropriate)

## 2018-01-01 NOTE — PROGRESS NOTES
"Pharmacy Consult  -  Warfarin    Lois Brennan is a  77 y.o. female   Height - 157.5 cm (62\")  Weight - 61.4 kg (135 lb 6.4 oz)    Consulting Provider: - Dr. Dahlia Angel (hospitalist)  Indication: - Pulmonary embolism  Goal INR: 2-3  Home Regimen: New start    Bridge Therapy: Yes, patient currently on therapeutic heparin    Drug-Drug Interactions with current regimen:  Currently receiving therapeutic heparin drip    Prednisone may increase risk of bleeding    Of note patient was taking amiodarone at home prior to admission    Warfarin Dosing During Admission:    Date  1/1           INR  1.07           Dose  2.5 mg                Education Provided:      Labs:      Results from last 7 days     Lab Units 01/01/18  0730 01/01/18  0102 12/31/17  1800 12/31/17  1211 12/31/17  0514 12/30/17 2006 12/30/17 1212   INR  1.07 --  --  --  --  --  --    APTT seconds 52.7 61.5* 68.3* 55.2 45.9 72.1* 69.2*   HEMOGLOBIN g/dL --  --  --  --  12.0 --  --    HEMATOCRIT % --  --  --  --  36.1 --  --    PLATELETS 10*3/mm3 --  --  --  --  201 --  --        Results from last 7 days     Lab Units 12/31/17 2002 12/31/17 0514   SODIUM mmol/L --  138   POTASSIUM mmol/L 4.7 3.4*   CHLORIDE mmol/L --  104   CO2 mmol/L --  27.0   BUN mg/dL --  19   CREATININE mg/dL --  1.10   CALCIUM mg/dL --  8.9   GLUCOSE mg/dL --  84         Assessment/Plan:     1. Due to advanced age and concurrent heparin therapy will begin warfarin today at 2.5 mg  2. Continue heparin until INR therapeutic  3. Daily INR    Pharmacy will continue to follow and make dose adjustments as needed       Thank you  Kelly Flower [facility]  1/1/2018  1:42 PM     "

## 2018-01-01 NOTE — PROGRESS NOTES
[facility] Medicine Services  PROGRESS NOTE    Patient Name: Lois Brennan  : 1940  MRN: [id number]    Date of Admission: 2017  Length of Stay: 10  Primary Care Physician: No Known Provider    Subjective   Subjective     CC: f/u AMS, PE and  Intracranial hemmorhage    HPI: No acute events overnight, patient transferred from ICU, patient states that she is feeling better, denies any soa or CP    Review of Systems  Gen- No fevers, chills  CV- No chest pain, palpitations  Resp- No cough, dyspnea  GI- No N/V/D, abd pain    Otherwise ROS is negative except as mentioned in the HPI.    Objective   Objective     Vital Signs:   Temp:  [97.6 °F (36.4 °C)-98.6 °F (37 °C)] 97.6 °F (36.4 °C)  Heart Rate:  [50-72] 71  Resp:  [16-20] 20  BP: ()/(53-90) 139/74  Total (NIH Stroke Scale): 6     Physical Exam:  Constitutional: No acute distress, awake, alert, seated in chair  HENT: NCAT, mucous membranes moist  Respiratory: Clear to auscultation bilaterally, respiratory effort normal   Cardiovascular: RRR, no murmurs, rubs, or gallops, palpable pedal pulses bilaterally  Gastrointestinal: Positive bowel sounds, soft, nontender, nondistended  Musculoskeletal: No bilateral ankle edema  Psychiatric: Appropriate affect, cooperative  Neurologic: Oriented, LUE>LLE weakness, left facial droop, speech slurred  Skin: No rashes    Results Reviewed:  I have personally reviewed current lab, radiology, and data and agree.      Results from last 7 days  Lab Units 17   WBC 10*3/mm3 7.77   HEMOGLOBIN g/dL 12.0   HEMATOCRIT % 36.1   PLATELETS 10*3/mm3 201       Results from last 7 days  Lab Units 17  1549   SODIUM mmol/L  --  138  --    POTASSIUM mmol/L 4.7 3.4*  --    CHLORIDE mmol/L  --  104  --    CO2 mmol/L  --  27.0  --    BUN mg/dL  --  19  --    CREATININE mg/dL  --  1.10  --    GLUCOSE mg/dL  --  84  --    CALCIUM mg/dL  --  8.9  --    TROPONIN I  ng/mL  --   --  0.031     No results found for: BNP  No results found for: PHART    Microbiology Results Abnormal     None          Imaging Results (last 24 hours)     Procedure Component Value Units Date/Time    CT Head Without Contrast [[id number]] Collected:  12/31/17 0834     Updated:  12/31/17 1639    Narrative:       EXAMINATION: CT HEAD WO CONTRAST - 12/31/2017     INDICATION: Headache.     TECHNIQUE: Axial CT of the head without intravenous contrast  administration.     The radiation dose reduction device was turned on for each scan per the  ALARA (As Low as Reasonably Achievable) protocol.     COMPARISON: CT head 12/29/2017.     FINDINGS: Evolving large intraparenchymal hemorrhage involving the right  frontal lobe is without significant interval change. Persistent  significant vasogenic edema of the right frontal lobe and cerebral  hemisphere. No new hemorrhage is identified. Ventricles and sulci are  within similar configuration without developing hydrocephalus. No  midline shift. Small area of high attenuation within the right parietal  lobe is no longer clearly identified. Basal cisterns are patent. Globes  and orbits unremarkable. Visualized paranasal sinuses and mastoid air  cells demonstrate layering fluid within the left maxillary sinus,  otherwise grossly clear and well-pneumatized. Calvarium intact.       Impression:       No significant interval change in appearance of evolving  right frontal lobe intraparenchymal hemorrhage with surrounding  vasogenic edema. No new hemorrhage, midline shift or mass effect is  identified. Previously noted right parietal smaller area of hemorrhage  is no longer clearly evident and may represent conspicuity from  degradation of blood products.     DICTATED:     12/31/2017  EDITED:          12/31/2017        This report was finalized on 12/31/2017 4:37 PM by Dr. Fernie Enrique.           Results for orders placed during the hospital encounter of 12/09/17   Adult  Transthoracic Echo Complete W/ Cont if Necessary Per Protocol (With Agitated Saline)    Narrative · Left ventricular systolic function is normal. Estimated EF = 70%.  · Normal right ventricular cavity size, wall thickness, systolic function   and septal motion noted.  · Saline test results are negative.  · The aortic valve exhibits sclerosis.  · No evidence of pulmonary hypertension is present.  · There is no evidence of pericardial effusion.          I have reviewed the medications.    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 She had a preceding episode of transient facial weakness earlier that week.  CT scan revealed a 3.7 x 3.8 cm frontal hemorrhage with some right to left shift and a smaller posterior and superior hemorrhage.  She had atrial fibrillation during her echocardiogram.  She eventually was discharged to rehabilitation on 12/14/2017.      She was readmitted on 12/22/2017 after being  transferred back from [facility] after developing headache, worsening mental status and worsening left-sided weakness.  There was concern for seizures.   She was doing well during the hospitalization and subsequently developed shortness of breath.  A CT angiogram done on 12/29/2017 showed an acute pulmonary embolism in the right main pulmonary artery and lower lobe pulmonary arteries.  She was started on a heparin drip and transferred to the intensive care unit. Neurosurgery was consulted and were agreeable. Patient was deemed stable and later transferred to floor for continued care.    Assessment & Plan:  - Pulmonary embolism (right main and RLL pulmonary arteries) currently on heparin gtt, pulm and NS agreeable to continue anticoagulate, coumadin is recommend, we will ask pharmacy to dose  - Intracranial hemorrhage - hx of CVA with hemorrhagic conversion, followed by neurosurgery, risk for re-hemorrhage still present, but risks for having more PE outweigh this, as such ok to AC. will need repeat CT head before discharge.  - Suspected PAF, s/p amio, cardiology following, if afib seen on monitor we should get EKG  - Hx of HTN- this is well controlled  - Continue Keppra for sz  - well controlled T2DM A1C 7, continue ssi  - Pharyngeal dysphagia, continue level 3 diet    Complex case    DVT Prophylaxis: Heparin gtt    CODE STATUS: Full Code    Disposition: anticipate d/c in next few days to  alia care, CM following    Dahlia Angel MD  01/01/18  10:08 AM

## 2018-01-01 NOTE — PLAN OF CARE
Problem: Patient Care Overview (Adult)  Goal: Plan of Care Review  Outcome: Ongoing (interventions implemented as appropriate)   12/30/17 1156 01/01/18 0400 01/01/18 0447   Coping/Psychosocial Response Interventions   Plan Of Care Reviewed With --  patient --    Outcome Evaluation   Outcome Summary/Follow up Plan --  --  Patient transfered from [facility] during current shift. Patient is very apologetic and cries if she thinks she is causing any trouble for staff. This is residual from her prior stroke,as well as left side weakness.   Patient Care Overview   Progress improving --  --        Problem: Fall Risk (Adult)  Goal: Identify Related Risk Factors and Signs and Symptoms  Outcome: Ongoing (interventions implemented as appropriate)      Problem: Pressure Ulcer Risk (Kranthi Scale) (Adult,Obstetrics,Pediatric)  Goal: Identify Related Risk Factors and Signs and Symptoms  Outcome: Ongoing (interventions implemented as appropriate)      Problem: Diabetes, Type 2 (Adult)  Goal: Signs and Symptoms of Listed Potential Problems Will be Absent or Manageable (Diabetes, Type 2)  Outcome: Ongoing (interventions implemented as appropriate)      Problem: Confusion, Acute (Adult)  Goal: Identify Related Risk Factors and Signs and Symptoms  Outcome: Ongoing (interventions implemented as appropriate)      Problem: Skin Integrity Impairment, Risk/Actual (Adult)  Goal: Identify Related Risk Factors and Signs and Symptoms  Outcome: Ongoing (interventions implemented as appropriate)

## 2018-01-02 LAB
APTT PPP: 53.9 SECONDS (ref 45–60)
APTT PPP: 55.5 SECONDS (ref 45–60)
APTT PPP: 62.1 SECONDS (ref 45–60)
GLUCOSE BLDC GLUCOMTR-MCNC: 105 MG/DL (ref 70–130)
GLUCOSE BLDC GLUCOMTR-MCNC: 212 MG/DL (ref 70–130)
GLUCOSE BLDC GLUCOMTR-MCNC: 219 MG/DL (ref 70–130)
GLUCOSE BLDC GLUCOMTR-MCNC: 225 MG/DL (ref 70–130)
GLUCOSE BLDC GLUCOMTR-MCNC: 250 MG/DL (ref 70–130)
INR PPP: 1.03
PROTHROMBIN TIME: 11.2 SECONDS (ref 9.6–11.5)

## 2018-01-02 PROCEDURE — 99232 SBSQ HOSP IP/OBS MODERATE 35: CPT | Performed by: INTERNAL MEDICINE

## 2018-01-02 PROCEDURE — 85610 PROTHROMBIN TIME: CPT | Performed by: INTERNAL MEDICINE

## 2018-01-02 PROCEDURE — 97530 THERAPEUTIC ACTIVITIES: CPT

## 2018-01-02 PROCEDURE — 85730 THROMBOPLASTIN TIME PARTIAL: CPT

## 2018-01-02 PROCEDURE — 99233 SBSQ HOSP IP/OBS HIGH 50: CPT | Performed by: INTERNAL MEDICINE

## 2018-01-02 PROCEDURE — 82962 GLUCOSE BLOOD TEST: CPT

## 2018-01-02 PROCEDURE — 25010000002 HEPARIN (PORCINE) PER 1000 UNITS

## 2018-01-02 PROCEDURE — 97110 THERAPEUTIC EXERCISES: CPT

## 2018-01-02 PROCEDURE — 63710000001 PREDNISONE PER 5 MG: Performed by: INTERNAL MEDICINE

## 2018-01-02 RX ORDER — LORAZEPAM 0.5 MG/1
0.5 TABLET ORAL ONCE
Status: COMPLETED | OUTPATIENT
Start: 2018-01-02 | End: 2018-01-02

## 2018-01-02 RX ADMIN — FAMOTIDINE 20 MG: 20 TABLET ORAL at 20:34

## 2018-01-02 RX ADMIN — HEPARIN SODIUM 11 UNITS/KG/HR: 10000 INJECTION, SOLUTION INTRAVENOUS at 20:35

## 2018-01-02 RX ADMIN — LORAZEPAM 0.5 MG: 0.5 TABLET ORAL at 15:28

## 2018-01-02 RX ADMIN — INSULIN LISPRO 3 UNITS: 100 INJECTION, SOLUTION INTRAVENOUS; SUBCUTANEOUS at 20:35

## 2018-01-02 RX ADMIN — INSULIN LISPRO 3 UNITS: 100 INJECTION, SOLUTION INTRAVENOUS; SUBCUTANEOUS at 17:41

## 2018-01-02 RX ADMIN — METOPROLOL TARTRATE 12.5 MG: 25 TABLET, FILM COATED ORAL at 20:34

## 2018-01-02 RX ADMIN — METOPROLOL TARTRATE 12.5 MG: 25 TABLET, FILM COATED ORAL at 09:16

## 2018-01-02 RX ADMIN — FAMOTIDINE 20 MG: 20 TABLET ORAL at 09:16

## 2018-01-02 RX ADMIN — WARFARIN SODIUM 2.5 MG: 2.5 TABLET ORAL at 17:42

## 2018-01-02 RX ADMIN — LEVETIRACETAM 500 MG: 500 TABLET, FILM COATED ORAL at 09:16

## 2018-01-02 RX ADMIN — AMLODIPINE BESYLATE 5 MG: 5 TABLET ORAL at 09:16

## 2018-01-02 RX ADMIN — DONEPEZIL HYDROCHLORIDE 10 MG: 10 TABLET, FILM COATED ORAL at 20:34

## 2018-01-02 RX ADMIN — ATORVASTATIN CALCIUM 10 MG: 10 TABLET, FILM COATED ORAL at 20:34

## 2018-01-02 RX ADMIN — PREDNISONE 7.5 MG: 5 TABLET ORAL at 09:15

## 2018-01-02 RX ADMIN — LEVETIRACETAM 500 MG: 500 TABLET, FILM COATED ORAL at 20:34

## 2018-01-02 RX ADMIN — INSULIN LISPRO 3 UNITS: 100 INJECTION, SOLUTION INTRAVENOUS; SUBCUTANEOUS at 12:46

## 2018-01-02 NOTE — PLAN OF CARE
Problem: Patient Care Overview (Adult)  Goal: Plan of Care Review  Outcome: Ongoing (interventions implemented as appropriate)   01/02/18 0438   Coping/Psychosocial Response Interventions   Plan Of Care Reviewed With patient   Outcome Evaluation   Outcome Summary/Follow up Plan Pt with no acute distress overnight.   Patient Care Overview   Progress no change       Problem: Fall Risk (Adult)  Goal: Absence of Falls  Outcome: Ongoing (interventions implemented as appropriate)      Problem: Pressure Ulcer Risk (Kranthi Scale) (Adult,Obstetrics,Pediatric)  Goal: Skin Integrity  Outcome: Ongoing (interventions implemented as appropriate)   01/02/18 0438   Pressure Ulcer Risk (Kranthi Scale) (Adult,Obstetrics,Pediatric)   Skin Integrity making progress toward outcome       Problem: Confusion, Acute (Adult)  Goal: Cognitive/Functional Impairments Minimized  Outcome: Ongoing (interventions implemented as appropriate)   01/02/18 0438   Confusion, Acute (Adult)   Cognitive/Functional Impairments Minimized making progress toward outcome     Goal: Safety  Outcome: Ongoing (interventions implemented as appropriate)   01/02/18 0438   Confusion, Acute (Adult)   Safety making progress toward outcome       Problem: Skin Integrity Impairment, Risk/Actual (Adult)  Goal: Skin Integrity/Wound Healing  Outcome: Ongoing (interventions implemented as appropriate)   01/02/18 0438   Skin Integrity Impairment, Risk/Actual (Adult)   Skin Integrity/Wound Healing making progress toward outcome

## 2018-01-02 NOTE — PROGRESS NOTES
Lois Brennan  [id number]  1940   LOS: 11 days   Patient Care Team:  No Known Provider as PCP - General    Chief Complaint:  PE / CVA / HTN    Subjective     Comfortable in bed off supplemental oxygen therapy being fed breakfast without difficulty or complaint with her stuffed animal  looking on. She denies anginal type chest discomfort, increased shortness of breath, nausea, emesis, abdominal pain, headache, or additional focal motor-sensory changes but is somewhat slow to answer specific questions.  Her caregiver who is feeding her states that she has done well and remained comfortable without interim complaints or notable difficulties.    Objective     Vital Sign Min/Max for last 24 hours  Temp  Min: 97.4 °F (36.3 °C)  Max: 98.5 °F (36.9 °C)   BP  Min: 128/95  Max: 145/82   Pulse  Min: 53  Max: 99   Resp  Min: 18  Max: 22   SpO2  Min: 95 %  Max: 95 %   Flow (L/min)  Min: 2  Max: 2   Weight  Min: 65 kg (143 lb 6.4 oz)  Max: 65 kg (143 lb 6.4 oz)     Last 2 weights    01/01/18  0400 01/02/18  0600   Weight: 61.4 kg (135 lb 6.4 oz) 65 kg (143 lb 6.4 oz)         Intake/Output Summary (Last 24 hours) at 01/02/18 0844  Last data filed at 01/01/18 1815   Gross per 24 hour   Intake              420 ml   Output                0 ml   Net              420 ml       Physical Exam:     General Appearance:    Alert, cooperative, in no acute distress   Lungs:     Clear to auscultation,respirations regular, even and                   unlabored    Heart:    Regular and normal rate, normal S1 and S2, no            murmur, no gallop, no rub, no click   Abdomen:  Extremities:   Soft, non-tender, bowel sounds audible x4    No edema, normal range of motion   Pulses:   Pulses palpable and equal bilaterally      Results Review:     Results from last 7 days  Lab Units 12/31/17 2002 12/31/17  0514   SODIUM mmol/L  --  138   POTASSIUM mmol/L 4.7 3.4*   CHLORIDE mmol/L  --  104   CO2 mmol/L  --  27.0   BUN mg/dL  --  19    CREATININE mg/dL  --  1.10   GLUCOSE mg/dL  --  84   CALCIUM mg/dL  --  8.9       Results from last 7 days  Lab Units 12/31/17  0514   WBC 10*3/mm3 7.77   HEMOGLOBIN g/dL 12.0   HEMATOCRIT % 36.1   PLATELETS 10*3/mm3 201               Results from last 7 days  Lab Units 12/28/17  1549   TROPONIN I ng/mL 0.031     NO CXR / EKG.    Medication Review: REVIEWED; only drip remains IV heparin 12 units/kilogram/hour.    Assessment/Plan     Improved blood pressure control with acceptable oximetry readings and persistent normal sinus rhythm.  As per Dr. Sanders's note yesterday we will standby and be available.  Would continue current cardiac medications and she has scheduled follow-up with Dr. Soy Bauman III in office later this month.  Warfarin started yesterday and concur with treatment goal of 2.5-3.0 INR. THANKS.    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

## 2018-01-02 NOTE — PROGRESS NOTES
Continued Stay Note   Deborah     Patient Name: Lois Brennan  MRN: [id number]  Today's Date: 1/2/2018    Admit Date: 12/22/2017          Discharge Plan       01/02/18 1444    Case Management/Social Work Plan    Plan [facility]    Patient/Family In Agreement With Plan yes    Additional Comments Received call from Leonor requesting updated clinicals for insurance precert to be processed. Efaxed clinicals to [phone].               Discharge Codes     None        Expected Discharge Date and Time     Expected Discharge Date Expected Discharge Time    Dec 29, 2017             Marissa Cagle

## 2018-01-02 NOTE — PROGRESS NOTES
[facility] Medicine Services  PROGRESS NOTE    Patient Name: Lois Brennan  : 1940  MRN: [id number]    Date of Admission: 2017  Length of Stay: 11  Primary Care Physician: No Known Provider    Subjective   Subjective     CC: f/u for PE, and intracranial hemmorrhage    HPI: No acute events overnight, patient state that she had a good night, she has no new complaints    Review of Systems  Gen- No fevers, chills  CV- No chest pain, palpitations  Resp- No cough, dyspnea  GI- No N/V/D, abd pain    Otherwise ROS is negative except as mentioned in the HPI.    Objective   Objective     Vital Signs:   Temp:  [97.4 °F (36.3 °C)-98.5 °F (36.9 °C)] 97.9 °F (36.6 °C)  Heart Rate:  [53-99] 53  Resp:  [18-22] 18  BP: (128-145)/(67-95) 128/95  Total (NIH Stroke Scale): 6     Physical Exam:  Constitutional: No acute distress, awake, alert, seated in chair  HENT: NCAT, mucous membranes moist  Respiratory: Clear to auscultation bilaterally, respiratory effort normal   Cardiovascular: RRR, no murmurs, rubs, or gallops, palpable pedal pulses bilaterally  Gastrointestinal: Positive bowel sounds, soft, nontender, nondistended  Musculoskeletal: No bilateral ankle edema  Psychiatric: Appropriate affect, cooperative  Neurologic: Oriented x 3, LUE weakness, left facial droop, speech clear but slighted slurred  Skin: No rashes    Results Reviewed:  I have personally reviewed current lab, radiology, and data and agree.      Results from last 7 days  Lab Units 18  0514   WBC 10*3/mm3  --   --  7.77   HEMOGLOBIN g/dL  --   --  12.0   HEMATOCRIT %  --   --  36.1   PLATELETS 10*3/mm3  --   --  201   INR  1.03 1.07  --        Results from last 7 days  Lab Units 17  0514 17  1549   SODIUM mmol/L  --  138  --    POTASSIUM mmol/L 4.7 3.4*  --    CHLORIDE mmol/L  --  104  --    CO2 mmol/L  --  27.0  --    BUN mg/dL  --  19  --    CREATININE mg/dL  --   1.10  --    GLUCOSE mg/dL  --  84  --    CALCIUM mg/dL  --  8.9  --    TROPONIN I ng/mL  --   --  0.031     No results found for: BNP  No results found for: PHART    Microbiology Results Abnormal     None        Results for orders placed during the hospital encounter of 12/09/17   Adult Transthoracic Echo Complete W/ Cont if Necessary Per Protocol (With Agitated Saline)    Narrative · Left ventricular systolic function is normal. Estimated EF = 70%.  · Normal right ventricular cavity size, wall thickness, systolic function   and septal motion noted.  · Saline test results are negative.  · The aortic valve exhibits sclerosis.  · No evidence of pulmonary hypertension is present.  · There is no evidence of pericardial effusion.          I have reviewed the medications.    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 She had a preceding episode of transient facial weakness earlier that week.  CT scan  revealed a 3.7 x 3.8 cm frontal hemorrhage with some right to left shift and a smaller posterior and superior hemorrhage.  She had atrial fibrillation during her echocardiogram.  She eventually was discharged to rehabilitation on 12/14/2017.      She was readmitted on 12/22/2017 after being transferred back from [facility] after developing headache, worsening mental status and worsening left-sided weakness.  There was concern for seizures.   She was doing well during the hospitalization and subsequently developed shortness of breath.  A CT angiogram done on 12/29/2017 showed an acute pulmonary embolism in the right main pulmonary artery and lower lobe pulmonary arteries.  She was started on a heparin drip and transferred to the intensive care unit. Neurosurgery was consulted and were agreeable. Patient was deemed stable and later transferred to floor for continued care.     Assessment & Plan:  - Pulmonary embolism (right main and RLL pulmonary arteries) currently on heparin gtt, pulm and NS agreeable to continue anticoagulate, coumadin is recommend and was started 1/1/2018 pharmacy following, goal 2.5-3, heparin gtt  - Intracranial hemorrhage - hx of CVA with hemorrhagic conversion, followed by neurosurgery, risk for re-hemorrhage still present, but risks for having more PE outweigh this, as such ok to AC. will need repeat CT head before discharge.  - Suspected PAF, s/p amio, cardiology following, if afib seen on monitor we should get EKG  - Hx of HTN- this is well controlled  - Continue Keppra for sz  - well controlled T2DM A1C 7, continue ssi  - Pharyngeal dysphagia, continue level 3 diet     Complex case     DVT Prophylaxis: Heparin gtt/coumadin     CODE STATUS: Full Code     Disposition: anticipate d/c in next few days to  [facility], CM following    Dahlia Angel MD  01/02/18  9:34 AM

## 2018-01-02 NOTE — PLAN OF CARE
Problem: Patient Care Overview (Adult)  Goal: Plan of Care Review  Outcome: Ongoing (interventions implemented as appropriate)   01/02/18 1134   Coping/Psychosocial Response Interventions   Plan Of Care Reviewed With patient   Outcome Evaluation   Outcome Summary/Follow up Plan pt confused,up in chair.needs max. assist for transfers,went back to bed.   Patient Care Overview   Progress no change       Problem: Inpatient Physical Therapy  Goal: Bed Mobility Goal LTG- PT  Outcome: Ongoing (interventions implemented as appropriate)   12/23/17 1356 12/30/17 1156 01/02/18 1134   Bed Mobility PT LTG   Bed Mobility PT LTG, Date Established --  12/30/17 --    Bed Mobility PT LTG, Time to Achieve --  2 wks --    Bed Mobility PT LTG, Activity Type --  supine to sit/sit to supine --    Bed Mobility PT LTG, [place] Level --  contact guard assist --    Bed Mobility PT LTG, Date Goal Reviewed 12/23/17 --  --    Bed Mobility PT LTG, Outcome --  --  goal ongoing     Goal: Transfer Training Goal 2 LTG- PT  Outcome: Ongoing (interventions implemented as appropriate)   12/23/17 1356 12/30/17 1156 01/02/18 1134   Transfer Training 2 PT LTG   Transfer Training PT 2 LTG, Date Established --  12/30/17 --    Transfer Training PT 2 LTG, Time to Achieve --  2 wks --    Transfer Training PT 2 LTG, Activity Type --  sit to stand/stand to sit --    Transfer Training PT 2 LTG, [place] Level --  minimum assist (75% patient effort) --    Transfer Training PT 2 LTG, Assist Device (appropriate AD) --  --    Transfer Training PT 2 LTG, Date Goal Reviewed 12/23/17 --  --    Transfer Training PT 2 LTG, Outcome --  --  goal ongoing     Goal: Gait Training Goal LTG- PT  Outcome: Ongoing (interventions implemented as appropriate)   12/23/17 1356 12/30/17 1156 01/02/18 1134   Gait Training PT LTG   Gait Training Goal PT LTG, Date Established --  12/30/17 --    Gait Training Goal PT LTG, Time to Achieve --  2 wks --    Gait Training Goal PT LTG,  [place] Level --  moderate assist (50% patient effort) --    Gait Training Goal PT LTG, Assist Device (appropriated AD) --  --    Gait Training Goal PT LTG, Distance to Achieve --  50 --    Gait Training Goal PT LTG, Date Goal Reviewed 12/23/17 --  --    Gait Training Goal PT LTG, Outcome --  --  goal ongoing

## 2018-01-02 NOTE — PROGRESS NOTES
Continued Stay Note  [facility]     Patient Name: Lois Brennan  MRN: [id number]  Today's Date: 1/2/2018    Admit Date: 12/22/2017          Discharge Plan       01/02/18 0925    Case Management/Social Work Plan    Plan [facility]    Patient/Family In Agreement With Plan yes    Additional Comments Spoke to Leonor at [facility] in [facility]. Insurance precert remains pending. Per MD, may be a few days before medically ready, pt started on Coumadin. Ambulance cancelled for today and will need rescheduled when medically ready.  CM will cont to follow.               Discharge Codes     None        Expected Discharge Date and Time     Expected Discharge Date Expected Discharge Time    Dec 29, 2017             Marissa Cagle

## 2018-01-02 NOTE — THERAPY TREATMENT NOTE
Acute Care - Physical Therapy Treatment Note  [facility]     Patient Name: Lois Brennan  : 1940  MRN: [id number]  Today's Date: 2018  Onset of Illness/Injury or Date of Surgery Date: 17  Date of Referral to PT: 17  Referring Physician: SHYANN Eli    Admit Date: 2017    Visit Dx:    ICD-10-CM ICD-9-CM   1. Pharyngeal dysphagia R13.13 787.23   2. Left-sided weakness R53.1 728.87   3. Somnolence R40.0 780.09   4. 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 Paroxysmal atrial fibrillation I48.0 427.31   6. Diabetes mellitus type 2 in obese E11.69 250.00    E66.9 278.00   7. Hypertension, unspecified type I10 401.9   8. Impaired mobility and ADLs Z74.09 799.89   9. 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   • Other pulmonary embolism without acute cor pulmonale               Adult Rehabilitation Note       18 1033          Rehab Assessment/Intervention    Discipline physical therapy assistant  -UD      Document Type therapy note (daily note)  -UD      Subjective Information agree to therapy;complains of;weakness;pain  -UD      Patient Effort, Rehab Treatment adequate  -UD      Symptoms Noted During/After Treatment increased pain  -UD      Precautions/Limitations fall precautions  -UD      Recorded by [UD] Zina Enamorado PTA      Vital Signs    O2 Delivery Post Treatment room air  -UD      Pre Patient Position  Sitting  -UD      Intra Patient Position Standing  -UD      Post Patient Position Supine  -UD      Recorded by [UD] Zina Enamorado PTA      Pain Assessment    Pain Assessment Torres-Montano FACES  -UD      Torres-Montano FACES Pain Rating 4  -UD      Pain Score 4  -UD      Post Pain Score 6  -UD      Pain Type Acute pain  -UD      Pain Location Hip  -UD      Pain Orientation Right  -UD      Pain Intervention(s) Repositioned  -UD      Recorded by [UD] Zina Enamorado PTA      Cognitive Assessment/Intervention    Orientation Status oriented to;person  -UD      Follows Commands/Answers Questions 75% of the time  -UD      Personal Safety Interventions fall prevention program maintained  -UD      Recorded by [UD] Zina Enamorado PTA      Bed Mobility, Assessment/Treatment    Bed Mob, Sit to Supine, [place] maximum assist (25% patient effort);2 person assist required  -UD      Recorded by [UD] Zina Enamorado PTA      Transfer Assessment/Treatment    Transfers, Sit-Stand [place] maximum assist (25% patient effort);2 person assist required  -UD      Transfers, Stand-Sit [place] maximum assist (25% patient effort);2 person assist required  -UD      Recorded by [UD] Zina Enamorado PTA      Gait Assessment/Treatment    Gait, [place] Level other (see comments);maximum assist (25% patient effort);2 person assist required   just 2-3 steps back to bed  -UD      Recorded by [UD] Zina Enamorado PTA      Therapy Exercises    Bilateral Lower Extremities AROM:;10 reps;sitting  -UD      Recorded by [UD] Zina Enamorado PTA      Positioning and Restraints    Pre-Treatment Position sitting in chair/recliner  -UD      Post Treatment Position bed  -UD      In Bed notified nsg;supine;call light within reach;exit alarm on;side rails up x2  -UD      Recorded by [UD] Zina Enamorado PTA        User Key  (r) = Recorded By, (t) = Taken By, (c) = Cosigned By    Initials Name Effective Dates    UD Zina Enamorado PTA 06/22/15 -                 IP PT  Goals       01/02/18 1134 12/30/17 1156 12/28/17 1227    Bed Mobility PT LTG    Bed Mobility PT LTG, Date Established  12/30/17  -MISSY     Bed Mobility PT LTG, Time to Achieve  2 wks  -MISSY     Bed Mobility PT LTG, Activity Type  supine to sit/sit to supine  -MISSY     Bed Mobility PT LTG, [place] Level  contact guard assist  -MISSY     Bed Mobility PT LTG, Outcome goal ongoing  -UD goal revised  -MISSY goal met  -UD    Transfer Training 2 PT LTG    Transfer Training PT 2 LTG, Date Established  12/30/17  -MISSY     Transfer Training PT 2 LTG, Time to Achieve  2 wks  -MISSY     Transfer Training PT 2 LTG, Activity Type  sit to stand/stand to sit  -MISSY     Transfer Training PT 2 LTG, [place] Level  minimum assist (75% patient effort)  -MISSY     Transfer Training PT 2 LTG, Outcome goal ongoing  -UD goal revised  -MISSY goal met  -UD    Gait Training PT LTG    Gait Training Goal PT LTG, Date Established  12/30/17  -MISSY     Gait Training Goal PT LTG, Time to Achieve  2 wks  -MISSY     Gait Training Goal PT LTG, [place] Level  moderate assist (50% patient effort)  -MISSY     Gait Training Goal PT LTG, Distance to Achieve  50  -MISSY     Gait Training Goal PT LTG, Outcome goal ongoing  -UD goal revised  -MISSY goal ongoing  -UD      12/26/17 1531 12/23/17 1356       Bed Mobility PT LTG    Bed Mobility PT LTG, Date Established  12/23/17  -MJ     Bed Mobility PT LTG, Time to Achieve  5 days  -MJ     Bed Mobility PT LTG, Activity Type  supine to sit/sit to supine  -MJ     Bed Mobility PT LTG, [place] Level  moderate assist (50% patient effort);2 person assist required  -MJ     Bed Mobility PT LTG, Date Goal Reviewed  12/23/17  -MJ     Bed Mobility PT LTG, Outcome goal ongoing  -SC goal ongoing  -MJ     Transfer Training PT LTG    Transfer Training PT LTG, Date Established  12/23/17  -MJ     Transfer Training PT LTG, Time to Achieve  1 wk  -MJ     Transfer Training PT LTG, Activity Type  sit to stand/stand to sit  -MJ     Transfer Training PT  LTG, [place] Level  moderate assist (50% patient effort);2 person assist required  -MJ     Transfer Training PT LTG, Assist Device  --   appropriate AD  -MJ     Transfer Training PT  LTG, Date Goal Reviewed  12/23/17  -MJ     Transfer Training PT LTG, Outcome goal met  -SC goal ongoing  -MJ     Transfer Training 2 PT LTG    Transfer Training PT 2 LTG, Date Established  12/23/17  -MJ     Transfer Training PT 2 LTG, Time to Achieve  1 wk  -MJ     Transfer Training PT 2 LTG, Activity Type  bed to chair /chair to bed  -MJ     Transfer Training PT 2 LTG, [place] Level  moderate assist (50% patient effort);2 person assist required  -MJ     Transfer Training PT 2 LTG, Assist Device  --   appropriate AD  -MJ     Transfer Training PT 2 LTG, Date Goal Reviewed  12/23/17  -MJ     Transfer Training PT 2 LTG, Outcome  goal ongoing  -MJ     Gait Training PT LTG    Gait Training Goal PT LTG, Date Established  12/23/17  -MJ     Gait Training Goal PT LTG, Time to Achieve  1 wk  -MJ     Gait Training Goal PT LTG, [place] Level  maximum assist (25% patient effort);2 person assist required  -MJ     Gait Training Goal PT LTG, Assist Device  --   appropriated AD  -MJ     Gait Training Goal PT LTG, Distance to Achieve  10 feet  -MJ     Gait Training Goal PT LTG, Date Goal Reviewed  12/23/17  -MJ     Gait Training Goal PT LTG, Outcome  goal ongoing  -MJ       User Key  (r) = Recorded By, (t) = Taken By, (c) = Cosigned By    Initials Name Provider Type    DACIA Marsh, PT Physical Therapist    MISSY Rosa, PT Physical Therapist    MELODY Enamorado, ANABELA Physical Therapy Assistant    SABI Gibson, PT Physical Therapist          Physical Therapy Education     Title: PT OT SLP Therapies (Active)     Topic: Physical Therapy (Active)     Point: Mobility training (Active)    Learning Progress Summary    Learner Readiness Method Response Comment Documented by Status   Patient Acceptance E,D NR  MELODY 01/02/18 6177 Active     Acceptance E NR   12/30/17 1156 Active    Acceptance E NR  KS 12/29/17 1000 Active    Acceptance E,D DU,NR   12/28/17 1227 Done    Eager E DU,NR,VU reviewed benefits of activity SC 12/26/17 1531 Done    Acceptance E VU  EM 12/26/17 0423 Done    Acceptance E,D NR   12/23/17 1355 Active   Family Acceptance E NR  KS 12/29/17 1000 Active    Acceptance E,D VU,NR   12/23/17 1355 Done               Point: Home exercise program (Active)    Learning Progress Summary    Learner Readiness Method Response Comment Documented by Status   Patient Acceptance E,D NR   01/02/18 1133 Active    Acceptance E NR   12/30/17 1156 Active    Acceptance E NR  KS 12/29/17 1000 Active    Acceptance E,D DU,NR   12/28/17 1227 Done    Eager E DU,NR,VU reviewed benefits of activity SC 12/26/17 1531 Done    Acceptance E VU  EM 12/26/17 0423 Done   Family Acceptance E NR  KS 12/29/17 1000 Active               Point: Body mechanics (Active)    Learning Progress Summary    Learner Readiness Method Response Comment Documented by Status   Patient Acceptance E,D NR   01/02/18 1133 Active    Acceptance E NR   12/30/17 1156 Active    Acceptance E NR  KS 12/29/17 1000 Active    Acceptance E,D DU,NR   12/28/17 1227 Done    Eager E DU,NR,VU reviewed benefits of activity SC 12/26/17 1531 Done    Acceptance E VU  EM 12/26/17 0423 Done    Acceptance E,D NR   12/23/17 1355 Active   Family Acceptance E NR  KS 12/29/17 1000 Active    Acceptance E,D VU,NR   12/23/17 1355 Done               Point: Precautions (Active)    Learning Progress Summary    Learner Readiness Method Response Comment Documented by Status   Patient Acceptance E,D NR   01/02/18 1133 Active    Acceptance E NR   12/30/17 1156 Active    Acceptance E NR  KS 12/29/17 1000 Active    Acceptance E,D DU,NR   12/28/17 1227 Done    Eager E DU,NR,VU reviewed benefits of activity SC 12/26/17 1531 Done    Acceptance E VU  EM 12/26/17 0423 Done    Acceptance E,D NR   12/23/17  1355 Active   Family Acceptance E NR  KS 12/29/17 1000 Active    Acceptance E,D VU,NR  MJ 12/23/17 1355 Done                      User Key     Initials Effective Dates Name Provider Type Discipline    SC 06/19/15 -  Mikey Marsh, PT Physical Therapist PT    MISSY 06/19/15 -  Adwoa Rosa, PT Physical Therapist PT    UD 06/22/15 -  Zina Enamorado PTA Physical Therapy Assistant PT    MJ 03/14/16 -  Juan C Gibson, PT Physical Therapist PT    KS 09/18/16 -  Brina Kurtz, INGE Registered Nurse Nurse    EM 06/12/17 -  Halima Tavarez RN Registered Nurse Nurse                    PT Recommendation and Plan  Anticipated Discharge Disposition: inpatient rehabilitation facility  Planned Therapy Interventions: balance training, bed mobility training, gait training, home exercise program, patient/family education, strengthening, transfer training  PT Frequency: daily  Plan of Care Review  Plan Of Care Reviewed With: patient  Progress: no change  Outcome Summary/Follow up Plan: pt confused,up in chair.needs max. assist for transfers,went back to bed.          Outcome Measures       01/02/18 1033          How much help from another person do you currently need...    Turning from your back to your side while in flat bed without using bedrails? 2  -UD      Moving from lying on back to sitting on the side of a flat bed without bedrails? 2  -UD      Moving to and from a bed to a chair (including a wheelchair)? 2  -UD      Standing up from a chair using your arms (e.g., wheelchair, bedside chair)? 2  -UD      Climbing 3-5 steps with a railing? 1  -UD      To walk in hospital room? 2  -UD      AM-PAC 6 Clicks Score 11  -UD        User Key  (r) = Recorded By, (t) = Taken By, (c) = Cosigned By    Initials Name Provider Type    UD Zina Enamorado PTA Physical Therapy Assistant           Time Calculation:         PT Charges       01/02/18 1135          Time Calculation    PT Received On 01/02/18  -UD      PT Goal Re-Cert Due Date 01/02/18  -UD       Time Calculation- PT    Total Timed Code Minutes- PT 15 minute(s)  -UD        User Key  (r) = Recorded By, (t) = Taken By, (c) = Cosigned By    Initials Name Provider Type    UD Zina Enamorado PTA Physical Therapy Assistant          Therapy Charges for Today     Code Description Service Date Service Provider Modifiers Qty    [id number] HC PT THER PROC EA 15 MIN 1/2/2018 Zina Enamorado PTA GP 1          PT G-Codes  Outcome Measure Options: AM-PAC 6 Clicks Basic Mobility (PT)    Zina Enamorado PTA  1/2/2018

## 2018-01-02 NOTE — THERAPY PROGRESS REPORT/RE-CERT
Acute Care - Occupational Therapy Progress Note  [facility]     Patient Name: Lois Brennan  : 1940  MRN: [id number]  Today's Date: 2018  Onset of Illness/Injury or Date of Surgery Date: 17  Date of Referral to OT: 17  Referring Physician: SHYANN Eli      Admit Date: 2017    Visit Dx:     ICD-10-CM ICD-9-CM   1. Pharyngeal dysphagia R13.13 787.23   2. Left-sided weakness R53.1 728.87   3. Somnolence R40.0 780.09   4. 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 Paroxysmal atrial fibrillation I48.0 427.31   6. Diabetes mellitus type 2 in obese E11.69 250.00    E66.9 278.00   7. Hypertension, unspecified type I10 401.9   8. Impaired mobility and ADLs Z74.09 799.89   9. 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   • Other pulmonary embolism without acute cor pulmonale             Adult Rehabilitation Note       18 1038 18 1033       Rehab Assessment/Intervention    Discipline occupational therapist  -AN physical therapy assistant  -UD     Document Type therapy note (daily note)  -AN therapy note (daily note)  -UD     Subjective Information agree to therapy;complains of;pain  -AN agree to therapy;complains of;weakness;pain  -UD     Patient Effort, Rehab Treatment adequate  -AN adequate  -UD     Symptoms Noted During/After Treatment increased pain  -AN increased pain  -UD     Precautions/Limitations fall  precautions;other (see comments)   exit alarma  -AN fall precautions  -UD     Specific Treatment Considerations l side neglect, labile emotionally  -AN      Recorded by [AN] Selina Dowell OT [UD] Zina Enamorado PTA     Vital Signs    O2 Delivery Post Treatment  room air  -UD     Pre Patient Position  Sitting  -UD     Intra Patient Position  Standing  -UD     Post Patient Position  Supine  -UD     Recorded by  [UD] Zina Enamorado PTA     Pain Assessment    Pain Assessment Torres-Montano FACES  -AN Torres-Montano FACES  -UD     Torres-Montano FACES Pain Rating 4  -AN 4  -UD     Pain Score 3  -AN 4  -UD     Post Pain Score 2  -AN 6  -UD     Pain Type Acute pain  -AN Acute pain  -UD     Pain Location Hip  -AN Hip  -UD     Pain Orientation Right  -AN Right  -UD     Pain Intervention(s) Repositioned  -AN Repositioned  -UD     Response to Interventions tolerated, improved  -AN      Recorded by [AN] Selina Dowell OT [UD] Zina Enamorado PTA     Vision Assessment/Intervention    Visual Impairment inattention to the left  -AN      Recorded by [AN] Selina Dowell OT      Cognitive Assessment/Intervention    Orientation Status oriented to;person  -AN oriented to;person  -UD     Follows Commands/Answers Questions 75% of the time;needs cueing;needs increased time  -AN 75% of the time  -UD     Personal Safety moderate impairment  -AN      Personal Safety Interventions fall prevention program maintained  -AN fall prevention program maintained  -UD     Recorded by [AN] Selina Dowell OT [UD] Zina Enamorado PTA     Bed Mobility, Assessment/Treatment    Bed Mob, Sit to Supine, [place] maximum assist (25% patient effort);2 person assist required  -AN maximum assist (25% patient effort);2 person assist required  -UD     Recorded by [AN] Selina Dowell OT [UD] Zina Enamorado PTA     Transfer Assessment/Treatment    Transfers, Chair-Bed [place] maximum assist (25% patient effort);2 person assist required  -AN      Transfers, Sit-Stand  [place] maximum assist (25% patient effort);2 person assist required  -AN maximum assist (25% patient effort);2 person assist required  -UD     Transfers, Stand-Sit [place] maximum assist (25% patient effort);2 person assist required  -AN maximum assist (25% patient effort);2 person assist required  -UD     Recorded by [AN] Selina Dowell OT [UD] Zina Enamorado PTA     Gait Assessment/Treatment    Gait, [place] Level  other (see comments);maximum assist (25% patient effort);2 person assist required   just 2-3 steps back to bed  -UD     Recorded by  [UD] Zina Enamorado PTA     Balance Skills Training    Sitting-Level of Assistance Minimum assistance  -AN      Sitting-Balance Support Feet supported  -AN      Sitting-Balance Activities Trunk control activities  -AN      Sitting # of Minutes 3  -AN      Standing-Level of Assistance Moderate assistance;x2  -AN      Recorded by [AN] Selina Dowell OT      Therapy Exercises    Bilateral Lower Extremities  AROM:;10 reps;sitting  -UD     Left Upper Extremity AAROM:;5 reps;10 reps;sitting;elbow flexion/extension;hand pumps;shoulder horizontal abd/add  -AN      Recorded by [AN] Selina Dowell OT [UD] Zina Enamorado PTA     Positioning and Restraints    Pre-Treatment Position sitting in chair/recliner  -AN sitting in chair/recliner  -UD     Post Treatment Position bed  -AN bed  -UD     In Bed supine;call light within reach;encouraged to call for assist;exit alarm on  -AN notified nsg;supine;call light within reach;exit alarm on;side rails up x2  -UD     Recorded by [STEPHANIE] Selina Dowell OT [UD] Zina Enamorado PTA       User Key  (r) = Recorded By, (t) = Taken By, (c) = Cosigned By    Initials Name Effective Dates    MELODY Enamorado PTA 06/22/15 -     STEPHANIE Dowell OT 06/22/15 -                 OT Goals       12/23/17 1439          Transfer Training OT LTG    Transfer Training OT LTG, Date Established 12/23/17  -MISSY      Transfer Training OT LTG, Time to Achieve 1  wk  -MISSY      Transfer Training OT LTG, Activity Type sit to stand/stand to sit  -MISSY      Transfer Training OT LTG, Green Level moderate assist (50% patient effort);2 person assist required  -MISSY      Transfer Training OT LTG, Assist Device --   UE suppport/gait belt  -MISSY      Transfer Training OT LTG, Outcome goal ongoing  -MISSY      Strength OT LTG    Strength Goal OT LTG, Date Established 12/23/17  -MISSY      Strength Goal OT LTG, Time to Achieve 1 wk  -MISSY      Strength Goal OT LTG, Measure to Achieve Pt. participate in LUE ROM and WB and RUE ROM each session to support ADL indep.  -MISSY      Strength Goal OT LTG, Outcome goal ongoing  -MISSY      Attention to Task OT LTG    Attention to Task OT LTG 1, Date Established 12/23/17  -MISSY      Attention to Task OT LTG 1, Time to Achieve 1 wk  -MISSY      Attention to Task OT LTG 1, Activity Type 1 minute   attend to task, midline  -MISSY      Attention to Task Goal OT LTG 1, [place] Level minimum assist (75% patient effort)  -MISSY      Attention to Task OT LTG 1, Outcome goal ongoing  -MISSY      Grooming OT LTG    Grooming Goal OT LTG, Date Established 12/23/17  -MISSY      Grooming Goal OT LTG, Time to Achieve 1 wk  -MISSY      Grooming Goal OT LTG, Activity Type min assist wash face and comb hair  -MISSY      Grooming Goal OT LTG, Green Level minimum assist (75% patient effort);moderate assist (50% patient effort)   chetna tech  -MISSY      Grooming Goal OT LTG, Outcome goal ongoing  -MISSY        User Key  (r) = Recorded By, (t) = Taken By, (c) = Cosigned By    Initials Name Provider Type    MISSY Perdomo OT Occupational Therapist          Occupational Therapy Education     Title: PT OT SLP Therapies (Active)     Topic: Occupational Therapy (Active)     Point: ADL training (Active)    Description: Instruct learner(s) on proper safety adaptation and remediation techniques during self care or transfers.   Instruct in proper use of assistive devices.    Learning Progress Summary     Learner Readiness Method Response Comment Documented by Status   Patient Acceptance E,D NR Educated on pt posture and positioning for increased balance. AN 01/02/18 1137 Active    Acceptance E NR  KS 12/29/17 1000 Active    Acceptance E VU  EM 12/26/17 0423 Done    Acceptance E,D NR role OT, reason for consult, noted deficits, balance positioning, benefits activity, L sided attention MISSY 12/23/17 1437 Active   Family Acceptance E NR  KS 12/29/17 1000 Active    Acceptance E,D NR role OT, reason for consult, noted deficits, balance positioning, benefits activity, L sided attention MISSY 12/23/17 1437 Active               Point: Home exercise program (Active)    Description: Instruct learner(s) on appropriate technique for monitoring, assisting and/or progressing therapeutic exercises/activities.    Learning Progress Summary    Learner Readiness Method Response Comment Documented by Status   Patient Acceptance E NR  KS 12/29/17 1000 Active    Acceptance E VU  EM 12/26/17 0423 Done   Family Acceptance E NR  KS 12/29/17 1000 Active               Point: Precautions (Active)    Description: Instruct learner(s) on prescribed precautions during self-care and functional transfers.    Learning Progress Summary    Learner Readiness Method Response Comment Documented by Status   Patient Acceptance E NR  KS 12/29/17 1000 Active    Acceptance E VU  EM 12/26/17 0423 Done    Acceptance E,D NR role OT, reason for consult, noted deficits, balance positioning, benefits activity, L sided attention  12/23/17 1437 Active   Family Acceptance E NR  KS 12/29/17 1000 Active    Acceptance E,D NR role OT, reason for consult, noted deficits, balance positioning, benefits activity, L sided attention  12/23/17 1437 Active               Point: Body mechanics (Active)    Description: Instruct learner(s) on proper positioning and spine alignment during self-care, functional mobility activities and/or exercises.    Learning Progress Summary    Learner  Readiness Method Response Comment Documented by Status   Patient Acceptance E NR  KS 12/29/17 1000 Active    Acceptance E VU  EM 12/26/17 0423 Done    Acceptance E,D NR role OT, reason for consult, noted deficits, balance positioning, benefits activity, L sided attention  12/23/17 1437 Active   Family Acceptance E NR  KS 12/29/17 1000 Active    Acceptance E,D NR role OT, reason for consult, noted deficits, balance positioning, benefits activity, L sided attention  12/23/17 1437 Active                      User Key     Initials Effective Dates Name Provider Type Discipline     06/22/15 -  Karon Perdomo, OT Occupational Therapist OT    AN 06/22/15 -  Selina Dowell OT Occupational Therapist OT    KS 09/18/16 -  Brina Kurtz RN Registered Nurse Nurse     06/12/17 -  Halima Tavarez, RN Registered Nurse Nurse                  OT Recommendation and Plan  Anticipated Equipment Needs At Discharge:  (will allow rehab to determine )  Anticipated Discharge Disposition: inpatient rehabilitation facility  Plan of Care Review  Plan Of Care Reviewed With: patient  Progress: no change  Outcome Summary/Follow up Plan: Pt continues to need max assist x 2 with all txfrs and is confused and labile. Will continue to address L side weakness and ADL's. Pt will need SNF at discharge.        Outcome Measures       01/02/18 1038 01/02/18 1033       How much help from another person do you currently need...    Turning from your back to your side while in flat bed without using bedrails?  2  -UD     Moving from lying on back to sitting on the side of a flat bed without bedrails?  2  -UD     Moving to and from a bed to a chair (including a wheelchair)?  2  -UD     Standing up from a chair using your arms (e.g., wheelchair, bedside chair)?  2  -UD     Climbing 3-5 steps with a railing?  1  -UD     To walk in hospital room?  2  -UD     AM-PAC 6 Clicks Score  11  -UD     How much help from another is currently needed...    Putting on and  taking off regular lower body clothing? 1  -AN      Bathing (including washing, rinsing, and drying) 2  -AN      Toileting (which includes using toilet bed pan or urinal) 1  -AN      Putting on and taking off regular upper body clothing 1  -AN      Taking care of personal grooming (such as brushing teeth) 2  -AN      Eating meals 2  -AN      Score 9  -AN      Modified [place] Scale    Modified [place] Scale 4 - Moderately severe disability.  Unable to walk without assistance, and unable to attend to own bodily needs without assistance.  -AN        User Key  (r) = Recorded By, (t) = Taken By, (c) = Cosigned By    Initials Name Provider Type    MELODY Enamorado, PTA Physical Therapy Assistant    STEPHANIE Dowell OT Occupational Therapist           Time Calculation:         Time Calculation- OT       01/02/18 1140          Time Calculation- OT    OT Start Time 1038  -AN      Total Timed Code Minutes- OT 10 minute(s)  -AN      OT Received On 01/02/18  -AN      OT Goal Re-Cert Due Date 01/12/18  -AN        User Key  (r) = Recorded By, (t) = Taken By, (c) = Cosigned By    Initials Name Provider Type    STEPHANIE Dowell OT Occupational Therapist           Therapy Charges for Today     Code Description Service Date Service Provider Modifiers Qty    [id number]  OT THERAPEUTIC ACT EA 15 MIN 1/2/2018 Selina Dowell OT GO 1               Selina Dowell OT  1/2/2018

## 2018-01-02 NOTE — DISCHARGE PLACEMENT REQUEST
"Lois Dixon (77 y.o. Female)     Date of Birth Social Security Number Address Home Phone MRN    1940  [id number] [facility] [phone] [id number]    Adventism Marital Status          Orthodoxy        Admission Date Admission Type Admitting Provider Attending Provider Department, Room/Bed    17 Emergency Dahlia Angel MD Opi[place], [place], MD [address], [address]/1    Discharge Date Discharge Disposition Discharge Destination                      Attending Provider: Dahlia Angel MD     Allergies:  Ace Inhibitors    Isolation:  None   Infection:  None   Code Status:  FULL    Ht:  157.5 cm (62\")   Wt:  65 kg (143 lb 6.4 oz)    Admission Cmt:  None   Principal Problem:  Other pulmonary embolism without acute cor pulmonale [I26.99]                 Active Insurance as of 2017     Primary Coverage     Payor Plan Insurance Group Employer/Plan Group    ANTHEM MEDICARE REPLACEMENT ANTHEM MEDICARE ADVANTAGE [id number]     Payor Plan Address Payor Plan Phone Number Effective From Effective To    [address] [phone] 2016     [place] [address]       Subscriber Name Subscriber Birth Date Member ID       LOIS DIXON 1940 [id number]                 Emergency Contacts      (Rel.) Home Phone Work Phone Mobile Phone    Keely Leblanc (Power of ) [phone] -- [phone]               History & Physical      Dahlia Angel MD at 2017  5:41 PM              [facility] Medicine Services  HISTORY AND PHYSICAL    Patient Name: Lois Dixon  : 1940  MRN: [id number]  Primary Care Physician: No Known Provider    Subjective   Subjective     Chief Complaint:  Acute change in mental status    HPI:  Lois Dixon is a 77 y.o. female who was recently hospitalized in the ICU at [facility] from -17 with an acute intracranial hemorrhage.  She was transferred to [facility] " on 12/14 for further rehab.  She presents to [facility] this afternoon with acute onset of worsening in her mental status.  According to her daughter (POA) who was at the bedside, the patient had been doing well at [facility].  She had participating well with PT, eating well, and improving.  She reports that her mother was noted to be hypertensive yesterday.  She was complaining of a headache (the top of her head) that she rated as a 9/10.  The daughter notes that her right lower extremity has been jerking some over the past few days.  She doesn't recall any staring spells.  She woke up this morning in her normal state of health.  She ate a good breakfast with no issues.  She worked with physical therapy.  After lunch, the staff became concerned about a decline in her mental status.  She appeared to become more confused, her left sided weakness appeared to worsen, and she became increasingly lethargic.  She was brought to the ED by EMS for further evaluation.  In the ED, the patient had a CT scan that showed interval improvement of her right frontal ICH.  She was noted to be significantly impaired, only responding to sternal rub.  EKG- sinus bradycardia, /95.  Neurology was contacted by the ED physician, and they recommended an MRI brain, EEG, keppra load, and admission by hospital medicine.  Her vital signs in the ED were noted to be stable.       Review of Systems   Constitutional: Negative for chills and fever.   HENT: Negative for trouble swallowing.    Eyes: Negative.    Respiratory: Negative for shortness of breath and wheezing.    Cardiovascular: Negative for chest pain and leg swelling.   Genitourinary:        Incontinence since her previous hospitalization   Neurological: Positive for tremors (right lower extremity jerking), weakness (left sided weakness), numbness (left sided) and headaches (yesterday).      *Review of Systems limited due to patient's mental status.  The above information was obtained  from her daughter, who was at the bedside.*    Otherwise 10-system ROS reviewed and is negative except as mentioned in the HPI.    Personal History     Past Medical History:   Diagnosis Date   • Dementia    • Diabetes mellitus    • GERD (gastroesophageal reflux disease)    • Hypertension    • Rheumatoid arthritis    • TIA (transient ischemic attack)        Past Surgical History:   Procedure Laterality Date   • CARDIAC CATHETERIZATION     • CATARACT EXTRACTION, BILATERAL     • EYE SURGERY         Family History: family history includes Heart disease in her mother.     Social History:  reports that she has never smoked. She has never used smokeless tobacco. She reports that she does not drink alcohol or use illicit drugs.  Social History     Social History Narrative     ×5 years. Does not drive. Lives next door to her sister-in-law.        Medications:  Prescriptions Prior to Admission   Medication Sig Dispense Refill Last Dose   • amiodarone (PACERONE) 400 MG tablet Take 1 tablet by mouth Every 12 (Twelve) Hours.      • amLODIPine (NORVASC) 2.5 MG tablet Take 2.5 mg by mouth Daily.      • donepezil (ARICEPT) 10 MG tablet Take 10 mg by mouth Every Night.      • metFORMIN ER (GLUCOPHAGE-XR) 500 MG 24 hr tablet Take 500 mg by mouth Daily With Breakfast.      • metFORMIN ER (GLUCOPHAGE-XR) 500 MG 24 hr tablet Take 1,000 mg by mouth Daily With Dinner.      • metoprolol tartrate (LOPRESSOR) 25 MG tablet Take 0.5 tablets by mouth Every 12 (Twelve) Hours.      • POTASSIUM CHLORIDE PO Take 10 mEq by mouth Daily.      • predniSONE (DELTASONE) 5 MG tablet Take 7.5 mg by mouth Daily With Breakfast.      • raNITIdine (ZANTAC) 300 MG tablet Take 300 mg by mouth 2 (Two) Times a Day.      • simvastatin (ZOCOR) 20 MG tablet Take 20 mg by mouth Every Night.          Allergies   Allergen Reactions   • Ace Inhibitors        Objective   Objective     Vital Signs:   Temp:  [97.9 °F (36.6 °C)-98.5 °F (36.9 °C)] 97.9 °F (36.6  °C)  Heart Rate:  [49-57] 56  Resp:  [16-20] 16  BP: (108-170)/(56-95) 163/72   Total (NIH Stroke Scale): 22    Physical Exam     Constitutional:  Resting on stretcher with eyes closed, daughter at bedside  Eyes: PERRLA, sclerae anicteric, no conjunctival injection  HENT: NCAT, mucous membranes moist  Neck: Supple, no thyromegaly, no lymphadenopathy, trachea midline  Respiratory: Clear to auscultation bilaterally, decreased in bases, nonlabored respirations   Cardiovascular: bradycardic, no murmurs, rubs, or gallops, palpable pedal pulses bilaterally  Gastrointestinal: Positive bowel sounds, soft, nontender, nondistended  Musculoskeletal: No bilateral ankle edema, no clubbing or cyanosis to extremities  Psychiatric:  Obtunded, currently responds to sternal rub only with a grimace  Neurologic:  Responds only to sternal rub, left hemiparesis, right foot + babinski, left foot flaccid  Skin: No rashes    Results Reviewed:  I have personally reviewed current lab, radiology, and data and agree.      Results from last 7 days  Lab Units 12/22/17  1456   WBC 10*3/mm3 12.64*   HEMOGLOBIN g/dL 14.8   HEMATOCRIT % 44.9*   PLATELETS 10*3/mm3 264       Results from last 7 days  Lab Units 12/22/17  1456   SODIUM mmol/L 134   POTASSIUM mmol/L 5.4   CHLORIDE mmol/L 102   CO2 mmol/L 20.0   BUN mg/dL 23   CREATININE mg/dL 1.50*   GLUCOSE mg/dL 301*   CALCIUM mg/dL 9.2   ALT (SGPT) U/L 12   AST (SGOT) U/L 18     Brief Urine Lab Results  (Last result in the past 365 days)      Color   Clarity   Blood   Leuk Est   Nitrite   Protein   CREAT   Urine HCG        12/22/17 1513 Yellow Clear Negative Negative Negative Negative             No results found for: BNP  No results found for: PHART  Imaging Results (last 24 hours)     Procedure Component Value Units Date/Time    CT Head Without Contrast Stroke Protocol [[id number]] Updated:  12/22/17 1451    XR Chest 1 View [[id number]] Collected:  12/22/17 1623     Updated:  12/22/17 1623     Narrative:       EXAMINATION: XR CHEST 1 VW-12/22/2017:      INDICATION: Acute Stroke Protocol (onset < 12 hrs).      COMPARISON: 12/09/2017.     FINDINGS: Heart is normal in size. Vasculature appears normal. Mild  chronic appearing perihilar interstitial changes are stable. No new  pulmonary parenchymal disease is seen.           Impression:       No new chest disease.     D:  12/22/2017  E:  12/22/2017             XR Orbits 4+ View [[id number]] Updated:  12/22/17 1741    MRI Brain Without Contrast [[id number]] Updated:  12/22/17 1821        Results for orders placed during the hospital encounter of 12/09/17   Adult Transthoracic Echo Complete W/ Cont if Necessary Per Protocol (With Agitated Saline)    Narrative · Left ventricular systolic function is normal. Estimated EF = 70%.  · Normal right ventricular cavity size, wall thickness, systolic function   and septal motion noted.  · Saline test results are negative.  · The aortic valve exhibits sclerosis.  · No evidence of pulmonary hypertension is present.  · There is no evidence of pericardial effusion.          Assessment/Plan   Assessment / Plan     Hospital Problem List     * (Principal)Altered mental status    Intracranial hemorrhage    Overview Signed 12/22/2017  4:45 PM by SHYANN Castellanos     Diagnosed 12/9/17.           Essential hypertension    Type 2 diabetes mellitus with complication, without long-term current use of insulin    Pharyngeal dysphagia    Overview Signed 12/22/2017  6:07 PM by SHYANN Castellanos     Sent to [facility] on a dysphagia level 3 diet with nectar thick liquids         GERD (gastroesophageal reflux disease)    Dementia    Rheumatoid arthritis    Paroxysmal atrial fibrillation          Assessment & Plan:    Acute altered mental status-  --CT of head shows improvement of recent ICH.   --MRI of brain  --stat EEG to rule out seizures.  She may be post-ictal right now. Lactic acid is wnl  --Keppra load in the ED.  Continue IV keppra 500 mg  BID  --Will keep NPO for now due to altered mental status/lethargy.   --Neurology to see    Recent intracranial hemorrhage-  --5 day ICU stay 12/9-12/14  --CT shows improvement     Type 2 DM, non-insulin dependent-  --accu-checks ACHS with LDSSI for now.  --recent A1C 7%    Hypertension-  --on amlodipine at home.  Will continue once patient is alert and able to swallow.    --IV hydralazine PRN for now.      Pharyngeal Dysphagia-  --had been on enteral feeds during previous admission  --recent diet:  Dysphagia level 3 with nectar thick liquids    RA-  --on chronic low dose prednisone    Paroxysmal atrial fibrillation-  --new diagnosis last admission  --on metoprolol and amiodarone  --no anticoagulation due to her recent ICH      *HOME MEDS WILL NEED TO BE ADDRESSED ONCE THE PATIENT IS ALERT AND ABLE TO TAKE PO*    DVT prophylaxis:  TEDS, SCDS    CODE STATUS:  Full Code    Admission Status: INPATIENT status due to the need for care which can only be reasonably provided in an hospital setting such as aggressive/expedited ancillary services and/or consultation services, the necessity for IV medications, close physician monitoring and/or the possible need for procedures.  In such, I feel patient’s risk for adverse outcomes and need for care warrant INPATIENT evaluation and predict the patient’s care encounter to likely last beyond 2 midnights.      Dahlia Angel MD   12/22/17   8:30 PM      Brief Attending Admission Attestation     I have seen and examined the patient, performing an independent face-to-face diagnostic evaluation with plan of care reviewed and developed with the advanced practice clinician (APC).    Brief Summary Statement/HPI:   Lois Brennan is a 77 y.o. female with recent admission and d/c to [facility] after presenting with acute hemorrhagic stroke.Per report patient was doing well with rehab, however today, she had an acute change in mentation and also developed worsening LLE weakness. Per report,  yesterday she did complain of having a HA and her BP was elevated.    Attending Physical Exam:  Constitutional: No acute distress, restless  Eyes: PERRLA, sclerae anicteric, no conjunctival injection  HENT: NCAT, mucous membranes moist  Neck: Supple, no thyromegaly, no lymphadenopathy, trachea midline  Respiratory: Clear to auscultation bilaterally, nonlabored respirations   Cardiovascular: RRR, no murmurs, rubs, or gallops, palpable pedal pulses bilaterally  Gastrointestinal: Positive bowel sounds, soft, nontender, nondistended  Musculoskeletal: No bilateral ankle edema, no clubbing or cyanosis to extremities  Psychiatric: Appropriate affect, cooperative  Neurologic: unable to assess  Skin: No rashes    Brief Assessment/Plan :  77 yof  with recent hemorrhagic CVA p/w acute onset change in mentation and worsening left sided weakness.CT head unrevealing previous bleed stable is not better. Patient is  arousble only to noxious stimuli, suspect likely seizure and is now postical, neurology has been consulted recommend Keppra load and MRI, EEG.    See above for further detailed assessment and plan developed with APC which I have reviewed and/or edited.    I believe this patient meets INPATIENT status due to the need for care which can only be reasonably provided in an hospital setting such as aggressive/expedited ancillary services and/or consultation services, the necessity for IV medications, close physician monitoring and/or the possible need for procedures.  In such, I feel patient’s risk for adverse outcomes and need for care warrant INPATIENT evaluation and predict the patient’s care encounter to likely last beyond 2 midnights.    Dahlia Angel MD  12/22/17  8:30 PM            Electronically signed by Dahlia Angel MD at 12/22/2017  8:30 PM      Bebeto Alvarez MD at 12/29/2017  4:19 PM          Cardiology Consult/H&P     Lois Brennan  1940  [phone]      12/29/17    DATE OF ADMISSION:  12/22/2017  [facility] 3F    No Known Provider  [facility] [address]    Chief Complaint: Paroxysmal atrial fibrillation    Patient Active Problem List   Diagnosis   • Recent intracranial hemorrhage felt related to hemorrhagic conversion of embolic infarcts in setting of Afib   • Essential hypertension   • Type 2 diabetes mellitus with complication, without long-term current use of insulin   • Pharyngeal dysphagia   • GERD (gastroesophageal reflux disease)   • Dementia   • Rheumatoid arthritis   • Paroxysmal atrial fibrillation   • Encephalopathy due to seizure   • 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  She was discharged to rehab 12/14 and had been doing well up until 12/22 where she was readmitted for worsening mental status and concern for seizures.  CT head revealed improvement of recent ICH.  She was initially diagnosed with paroxysmal atrial fibrillation during her first hospitalization but anticoagulation was deferred given ICH.  She has been on amiodarone for antiarrhythmic therapy.  During this hospitalization, she was found to have PE in right main and lower pulmonary arteries.  We are asked to evaluate the patient in regards to anticoagulation for the patient given known PEs and resolving ICH.  Per neurosurgery, they feel it is safe to anticoagulate given improvement of her ICH and risk of additional PEs may be greater than her risk of rehemorrhage.  She is very anxious about everything that is going on.  She notes an occasional palpitation but very brief in nature with no associated symptoms.  She denies any c/o CP, SOB, dizziness, lightheadedness, syncope.     Allergies   Allergen Reactions   • Ace Inhibitors        Prior  to Admission Medications     Prescriptions Last Dose Informant Patient Reported? Taking?    amiodarone (PACERONE) 400 MG tablet   No Yes    Take 1 tablet by mouth Every 12 (Twelve) Hours.    amLODIPine (NORVASC) 2.5 MG tablet  Family Member Yes Yes    Take 2.5 mg by mouth Daily.    donepezil (ARICEPT) 10 MG tablet   Yes Yes    Take 10 mg by mouth Every Night.    metFORMIN ER (GLUCOPHAGE-XR) 500 MG 24 hr tablet   Yes Yes    Take 500 mg by mouth Daily With Breakfast.    metFORMIN ER (GLUCOPHAGE-XR) 500 MG 24 hr tablet   Yes Yes    Take 1,000 mg by mouth Daily With Dinner.    metoprolol tartrate (LOPRESSOR) 25 MG tablet   No Yes    Take 0.5 tablets by mouth Every 12 (Twelve) Hours.    POTASSIUM CHLORIDE PO  Family Member Yes Yes    Take 10 mEq by mouth Daily.    predniSONE (DELTASONE) 5 MG tablet   Yes Yes    Take 7.5 mg by mouth Daily With Breakfast.    raNITIdine (ZANTAC) 300 MG tablet  Family Member Yes Yes    Take 300 mg by mouth 2 (Two) Times a Day.    simvastatin (ZOCOR) 20 MG tablet  Family Member Yes Yes    Take 20 mg by mouth Every Night.            Current Facility-Administered Medications:   •  acetaminophen (TYLENOL) tablet 650 mg, 650 mg, Oral, Q4H PRN, Yadira Chariton, APRN, 650 mg at 12/26/17 2119  •  amLODIPine (NORVASC) tablet 2.5 mg, 2.5 mg, Oral, Daily, Lara M Marivel II, DO, 2.5 mg at 12/29/17 0825  •  atorvastatin (LIPITOR) tablet 10 mg, 10 mg, Oral, Nightly, Lara M Marivel II, DO, 10 mg at 12/28/17 2104  •  bisacodyl (DULCOLAX) suppository 10 mg, 10 mg, Rectal, Daily PRN, Yadira Sreedhar, APRN, 10 mg at 12/26/17 1519  •  dextrose (D50W) solution 25 g, 25 g, Intravenous, Q15 Min PRN, Yadira Chariton, APRN  •  dextrose (GLUTOSE) oral gel 15 g, 15 g, Oral, Q15 Min PRN, Yadira Sreedhar, APRN  •  donepezil (ARICEPT) tablet 10 mg, 10 mg, Oral, Nightly, Lara M Marivel II, DO, 10 mg at 12/28/17 2104  •  famotidine (PEPCID) tablet 20 mg, 20 mg, Oral, BID, Lara Orta II, DO, 20 mg at 12/29/17 7691  •  glucagon  (human recombinant) (GLUCAGEN DIAGNOSTIC) injection 1 mg, 1 mg, Subcutaneous, Q15 Min PRN, Yadira Ida, APRN  •  hydrALAZINE (APRESOLINE) injection 10 mg, 10 mg, Intravenous, Q6H PRN, Yadira Sreedhar, APRN  •  insulin lispro (humaLOG) injection 0-7 Units, 0-7 Units, Subcutaneous, 4x Daily With Meals & Nightly, Yadira Sreedhar, APRN, 2 Units at 12/29/17 1228  •  levETIRAcetam in NaCl 0.82% (KEPPRA) IVPB 500 mg, 500 mg, Intravenous, Q12H, Yadira Sreedhar, APRN, Last Rate: 400 mL/hr at 12/28/17 1821, 500 mg at 12/29/17 0605  •  metoprolol tartrate (LOPRESSOR) half tablet 12.5 mg, 12.5 mg, Oral, Q12H, Lara M Marivel II, DO, 12.5 mg at 12/29/17 0825  •  predniSONE (DELTASONE) tablet 7.5 mg, 7.5 mg, Oral, Daily With Breakfast, Lara M Marivel II, DO, 7.5 mg at 12/29/17 0825  •  sodium chloride 0.9 % flush 1-10 mL, 1-10 mL, Intravenous, PRN, Yadira Ida, APRN  •  sodium chloride 0.9 % flush 10 mL, 10 mL, Intravenous, PRN, Triage Protocol Emergency, MD    Social History     Social History   • Marital status:      Spouse name: N/A   • Number of children: 1   • Years of education: N/A     Occupational History   • housewife      Social History Main Topics   • Smoking status: Never Smoker   • Smokeless tobacco: Never Used   • Alcohol use No   • Drug use: No   • Sexual activity: Defer     Other Topics Concern   • None     Social History Narrative     ×5 years. Does not drive. Lives next door to her sister-in-law.        Family History   Problem Relation Age of Onset   • Heart disease Mother        REVIEW OF SYSTEMS:   CONST:  + fatigue, no weight loss, fever, chills   HEENT:  No visual loss, blurred vision, double vision, yellow sclerae.                   No hearing loss, congestion, sore throat.   SKIN:      No rashes, urticaria, ulcers, sores.     RESP:     No shortness of breath, hemoptysis, cough, sputum.   GI:           No anorexia, nausea, vomiting, diarrhea. No abdominal pain, melena.   :         No burning on  urination, hematuria or increased frequency.  ENDO:    No diaphoresis, cold or heat intolerance. No polyuria or polydipsia.   NEURO:  No headache, dizziness, syncope, paralysis, ataxia, or parasthesias.                  No change in bowel or bladder control. No history of CVA/TIA  MUSC:    No muscle, back pain, joint pain or stiffness.   HEME:    No anemia, bleeding, bruising. No history of DVT/PE.  PSYCH:  + anxiety    Vitals:    12/29/17 0100 12/29/17 0437 12/29/17 0708 12/29/17 1320   BP:  164/71 165/79 152/81   BP Location:  Right arm Right arm Right arm   Patient Position:  Lying Lying Lying   Pulse: 51 (!) 47 54 79   Resp:  18 18 18   Temp:  98.1 °F (36.7 °C) 98.3 °F (36.8 °C) 97.7 °F (36.5 °C)   TempSrc:  Oral Oral    SpO2: 95% 95% 96%    Weight:       Height:             Vital Sign Min/Max for last 24 hours  Temp  Min: 97.7 °F (36.5 °C)  Max: 98.8 °F (37.1 °C)   BP  Min: 152/81  Max: 165/79   Pulse  Min: 47  Max: 89   Resp  Min: 16  Max: 18   SpO2  Min: 93 %  Max: 96 %   No Data Recorded      Intake/Output Summary (Last 24 hours) at 12/29/17 1619  Last data filed at 12/29/17 0200   Gross per 24 hour   Intake                0 ml   Output              275 ml   Net             -275 ml             Physical Exam:  GEN: Well nourished, Well- developed  No acute distress  HEENT: Normocephalic, Atraumatic, PERRLA, moist mucous membranes  NECK: supple, NO JVD, no thyromegaly, no lymphadenopathy  CARD: S1S2  RRR no murmur, gallop, rub  LUNGS: Clear to auscultataion, normal respiratory effort  ABDOMEN: Soft, nontender, normal bowel sounds  EXTREMITIES: L sided weakness, no gross deformities,  No clubbing, cyanosis, or edema  SKIN: Warm, dry  NEURO: No focal deficits  PSYCHIATRIC: Anxious, crying     Data:     Results from last 7 days  Lab Units 12/25/17  0343 12/23/17  0540   WBC 10*3/mm3 8.12 11.08*   HEMOGLOBIN g/dL 12.9 12.1   HEMATOCRIT % 39.8 37.3   PLATELETS 10*3/mm3 194 224       Results from last 7 days  Lab  Units 12/25/17  0343 12/23/17  0540   SODIUM mmol/L 139 137   POTASSIUM mmol/L 4.0 4.0   CHLORIDE mmol/L 109 104   CO2 mmol/L 22.0 28.0   BUN mg/dL 13 21   CREATININE mg/dL 1.00 1.10   GLUCOSE mg/dL 94 72                        Results from last 7 days  Lab Units 12/28/17  1549   TROPONIN I ng/mL 0.031               Intake/Output Summary (Last 24 hours) at 12/29/17 1619  Last data filed at 12/29/17 0200   Gross per 24 hour   Intake                0 ml   Output              275 ml   Net             -275 ml       Chest X-Ray:  Imaging Results (last 24 hours)     Procedure Component Value Units Date/Time    NM Lung Ventilation Perfusion [[id number]] Collected:  12/29/17 0801     Updated:  12/29/17 0841    Narrative:       EXAMINATION: NM LUNG VENTILATION PERFUSION-     INDICATION: Shortness of air; R13.13-Dysphagia, pharyngeal phase;  R53.1-Weakness; R40.0-Somnolence; I61.1-Nontraumatic intracerebral  hemorrhage in hemisphere, cortical; I48.0-Paroxysmal atrial  fibrillation; E11.69-Type 2 diabetes mellitus with other specified  complication; E66.9-Obesity, unspecified; I10-Essential (primary)  hypertension; Z74.09-Other reduced mobility.     TECHNIQUE: Ventilation perfusion lung scan was performed using Xenon and  technetium MAA respectively.     COMPARISON: Chest x-ray of the same day.     FINDINGS: The ventilatory scan demonstrates a normal wash-in phase.  However, there is significant tracer retention in the lung bases on the  washout phases, greater on the right than on the left. The perfusion  images demonstrate diminished perfusion in the right lung base as well  as with minimal defects in the upper lobes posteriorly. The scan is  indeterminate for the diagnosis of pulmonary embolus.       Impression:       Abnormal ventilatory scan with significant chronic  obstructive airways disease. The perfusion scan is partially matched but  not completely. Therefore the scan is indeterminate for diagnosis of  pulmonary  embolus.      D:  12/28/2017  E:  12/29/2017     This report was finalized on 12/29/2017 8:38 AM by Dr. Azam Lunsford MD.       CT Angiogram Chest With & Without Contrast [[id number]] Collected:  12/29/17 1046     Updated:  12/29/17 1055    Narrative:       EXAMINATION: CT ANGIOGRAM CHEST W WO CONTRAST- 12/29/2017     INDICATION: R13.13-Dysphagia, pharyngeal phase; R53.1-Weakness;  R40.0-Somnolence; I61.1-Nontraumatic intracerebral hemorrhage in  hemisphere, cortical; I48.0-Paroxysmal atrial fibrillation; E11.69-Type  2 diabetes mellitus with other specified complication; E66.9-Obesity,  unspecified; I10-Essential (primary) hypertension; Z74.09-Other reduced  mobility; shortness of breath, chest pain, atrial fibrillation     TECHNIQUE: Multiple axial CT imaging was obtained of the chest from the  thoracic inlet through the adrenal glands following the administration  of intravenous contrast. 2-D coronal reformatted images were submitted  to further facilitate diagnostic accuracy and treatment planning.     The radiation dose reduction device was turned on for each scan per the  ALARA (As Low as Reasonably Achievable) protocol.     COMPARISON: NONE     FINDINGS: There are filling defects identified within the right lower  lobe pulmonary arteries to suggest evidence of pulmonary embolism.  Motion artifact degrades image quality of the cardiac chambers for  determination of the RV/LV ratio which is approximately 1.1. There is no  pericardial effusion. The thoracic aorta reveals atherosclerotic  disease. Coronary artery calcification is identified. The lung  parenchyma is grossly clear. Minimal atelectatic changes identified  within the left lower lobe. There is a small patchy area of groundglass  opacity seen inferiorly within the right upper lobe suggesting possible  atelectatic change or infiltrate. The visualized portions of the upper  abdomen reveal a small cyst seen at the tail of the pancreas  measuring  approximately 1.6 cm. There are degenerative changes seen within the  spine.       Impression:       1. PE seen within the right main and lower lobe pulmonary arteries.  2. Atelectatic changes seen within the left lung base.  3. RV/LV ratio difficult to determine due to motion artifact however  approximately 1.1.  4. Small cyst seen at the tail of the pancreas.     D:  12/29/2017  E:  12/29/2017        This report was finalized on 12/29/2017 10:53 AM by Dr. Bhumika De Paz MD.       XR Chest 1 View [[id number]] Collected:  12/29/17 0905     Updated:  12/29/17 1100    Narrative:       EXAMINATION: XR CHEST 1 VW-12/28/2017:      INDICATION: SOA; R13.13-Dysphagia, pharyngeal phase; R53.1-Weakness;  R40.0-Somnolence; I61.1-Nontraumatic intracerebral hemorrhage in  hemisphere, cortical; I48.0-Paroxysmal atrial fibrillation; E11.69-Type  2 diabetes mellitus with other specified complication; E66.9-Obesity,  unspecified; I10-Essential (primary) hypertension; Z74.09-Other reduced  mobility; Z74.09-Other reduced mobility.      COMPARISON: 12/22/2017.     FINDINGS: Portable chest reveals cardiac and mediastinal silhouettes to  be within normal limits. The lung fields are grossly clear. No focal  parenchymal opacification present.  No pleural effusion or pneumothorax.  Degenerative changes seen within the spine. Pulmonary vascularity is  within normal limits.           Impression:       No acute cardiopulmonary disease.     D:  12/29/2017  E:  12/29/2017     This report was finalized on 12/29/2017 10:58 AM by Dr. Bhumika De Paz MD.       CT Head Without Contrast [[id number]] Collected:  12/29/17 1449     Updated:  12/29/17 1545    Narrative:       EXAMINATION: CT HEAD WO CONTRAST- 12/29/2017     INDICATION: R13.13-Dysphagia, pharyngeal phase; R53.1-Weakness;  R40.0-Somnolence; I61.1-Nontraumatic intracerebral hemorrhage in  hemisphere, cortical; I48.0-Paroxysmal atrial fibrillation; E11.69-Type  2 diabetes  mellitus with other specified complication; E66.9-Obesity,  unspecified; I10-Essential (primary) hypertension; Z74.09-Other reduced  mobility; intracerebral hemorrhage     TECHNIQUE: Multiple axial CT images obtained of the head from skull base  to skull vertex without the administration of intravenous contrast.     The radiation dose reduction device was turned on for each scan per the  ALARA (As Low as Reasonably Achievable) protocol.     COMPARISON: 12/10/2017     FINDINGS: There is continued evolution identified of the large  intraparenchymal hemorrhage involving the right frontoparietal lobe. The  surrounding edema is unchanged. There is no new hemorrhage identified.  Smaller intraparenchymal hemorrhage is seen posteriorly within the right  parietal lobe which is also evolving in the interval with minimal  surrounding edema and mass effect. Extensive chronic small vessel  ischemic changes seen within the periventricular and subcortical white  matter. No new hemorrhage identified. Mucous retention cyst seen in the  left maxillary sinus. The remainder of the visualized paranasal sinuses  are clear. Mastoid air cells remain patent.       Impression:       Evolving large right frontoparietal intraparenchymal  hemorrhage. The surrounding edema and mass effect is stable and  unchanged. There is no midline shift. No new hemorrhage. Smaller  hemorrhage is also evolving and stable.     D:  12/29/2017  E:  12/29/2017        This report was finalized on 12/29/2017 3:42 PM by Dr. Bhumika De Paz MD.             Telemetry: NSR with occasional PACs, HR 47-89  EKG 12/28/17: NSR with PACs, HR 80    Assessment and Plan:   1. Paroxysmal atrial fibrillation:  - No documented atrial fibrillation seen in the chart per EKG or telemetry  - Will defer anticoagulation to neurosurgery and pulmonology given known PE's as this appears to be more of a risk factor at this point for anticoagulation  - Please obtain EKG or print telemetry  strip if atrial fibrillation seen per monitor  2. ICH:  - Resolving per most recent CT head  - Neurosurgery following  3. PEs:  - PEs noted CT today   - LE duplex pending  - Pulmonology following      Scribed for Bebeto Alvarez MD by SHYANN Bueno. 12/29/2017  4:19 PM    Agree with above note by nurse practitioner.  History and physical examination verified.  Unfortunate 77-year-old white female with a history of hypertension, diabetes, GERD, mild dementia, rheumatoid arthritis, recent intracranial hemorrhage felt related to hemorrhagic conversion of the embolic infarcts, and questionable history of atrial fibrillation who was recently re-admitted secondary to worsening mental status and possible seizure disorder.  We are asked to see the patient for management of atrial fibrillation and need for possible anticoagulation.  In regards to her history of atrial fibrillation, there is been no documented 12-lead EKG or rhythm strip demonstrating atrial fibrillation.  During her last hospitalization she was seen by Dr. Haider from Cardiology during which time an echocardiogram was performed that demonstrated normal LV size and function and apparent initiation of amiodarone therapy.  The patient herself is completely asymptomatic from a cardiovascular standpoint.  She denies any prolonged episodes of tachypalpitations, chest pain, shortness of breath, jaw or neck pain, near-syncope or syncope, or back pain.  She states she feels extra beats on occasion these last only a few seconds in duration.  During this hospitalization, a VQ scan was performed which was indeterminate in nature.  A CT scan of the chest however did confirm the presence of pulmonary emboli.  The patient has been evaluated by both neurosurgery and as well as pulmonary services to discuss the need for possible anticoagulation for the treatment of her pulmonary emboli and presumed atrial fibrillation.  Recent head CT scan did demonstrate  improvement of the intracranial hemorrhage confirmed by neurosurgery.    Family history, social history, review of systems as per nurse practitioner's note.    Physical Exam  blood pressure 152/80 pulse is 80 respiratory rate 16 afebrile O2 saturation on 2 L 98%  Constitutional: oriented to person, place, and time.  well-developed and well-nourished. No distress.   HENT: Normocephalic.   Eyes: Conjunctivae are normal. No scleral icterus.   Neck: Normal carotid pulses, no hepatojugular reflux and no JVD present. Carotid bruit is not present. No tracheal deviation, no edema and no erythema present. No thyromegaly present.   Cardiovascular: Regular rate and rhythm normal S1 and S2 there is an S4 no murmurs are appreciated.  Pulses: equal and symmetrical.   Pulmonary/Chest: Clear to Auscultation anteriorly bilaterally with no rales or wheezes.  Abdominal: Soft. Bowel sounds are normal. no distension and no mass. There is no hepatosplenomegaly. There is no tenderness. There is no rebound and no guarding.   Musculoskeletal:  exhibits no edema, tenderness or deformity.   Neurological: is alert and oriented to person, place, and time.  Right facial droop is noted.  Significant left upper and left lower extremity weakness is noted.    Skin: Skin is warm and dry. No rash noted. No diaphoretic. No cyanosis or erythema. No pallor. Nails show no clubbing.   Psychiatric: Normal mood and affect ok    Laboratory data as per nurse practitioner note.    EKG: Normal sinus rhythm heart rate 80 bpm AZ interval 200 ms IVCD noted PACs present    No atrial fibrillation seen by review of all Twelve-lead EKGs and telemetry in the Epic system dating back to her original hospitalization.    Impression/plan: 1.  Presumable paroxysmal atrial fibrillation in patient with previous TIA and most recent CVA resulting in significant muscular deficits.  As mentioned above, I cannot document a single episode of atrial fibrillation by was presently is in  the chart or on continuous Holter monitor while the patient has been on this floor.  For now would continue amiodarone therapy although would consider discontinuing the drug unless atrial fibrillation and ultimately documented. From a anticoagulation standpoint, I cannot recommend oral anticoagulation without a definitive diagnosis of atrial fibrillation. 2.  Pulmonary emboli.  Agree with both pulmonary services and neurosurgical assessment regarding intravenous heparin and close monitoring in regards to her intracranial hemorrhage.  Also would consider IVC filter 3.  Hypertension.  Monitor blood pressure and adjust medications as needed.      I, Bebeto Alvarez MD, personally performed the services face to face as described and documented by the above named individual. I have made any necessary edits and it is both accurate and complete 12/29/2017  4:49 PM         Electronically signed by Bebeto Alvarez MD at 12/29/2017  5:02 PM        Hospital Medications (active)       Dose Frequency Start End    acetaminophen (TYLENOL) tablet 650 mg 650 mg Every 4 Hours PRN 12/22/2017     Sig - Route: Take 2 tablets by mouth Every 4 (Four) Hours As Needed for Mild Pain . - Oral    amLODIPine (NORVASC) tablet 5 mg 5 mg Daily 1/1/2018     Sig - Route: Take 1 tablet by mouth Daily. - Oral    Notes to Pharmacy: Give full dose this morning.    atorvastatin (LIPITOR) tablet 10 mg 10 mg Nightly 12/23/2017     Sig - Route: Take 1 tablet by mouth Every Night. - Oral    bisacodyl (DULCOLAX) suppository 10 mg 10 mg Daily PRN 12/22/2017     Sig - Route: Insert 1 suppository into the rectum Daily As Needed for Constipation. - Rectal    dextrose (D50W) solution 25 g 25 g Every 15 Minutes PRN 12/22/2017     Sig - Route: Infuse 50 mL into a venous catheter Every 15 (Fifteen) Minutes As Needed for Low Blood Sugar (Blood Sugar Less Than 70, Patient Has IV Access - Unresponsive, NPO or Unable To Safely Swallow). - Intravenous    dextrose  (GLUTOSE) oral gel 15 g 15 g Every 15 Minutes PRN 12/22/2017     Sig - Route: Take 15 g by mouth Every 15 (Fifteen) Minutes As Needed for Low Blood Sugar (Blood Sugar Less Than 70, Patient Alert, Is Not NPO & Can Safely Swallow). - Oral    donepezil (ARICEPT) tablet 10 mg 10 mg Nightly 12/23/2017     Sig - Route: Take 1 tablet by mouth Every Night. - Oral    famotidine (PEPCID) tablet 20 mg 20 mg 2 Times Daily (BID) 12/23/2017     Sig - Route: Take 1 tablet by mouth 2 (Two) Times a Day. - Oral    glucagon (human recombinant) (GLUCAGEN DIAGNOSTIC) injection 1 mg 1 mg Every 15 Minutes PRN 12/22/2017     Sig - Route: Inject 1 mg under the skin Every 15 (Fifteen) Minutes As Needed (Blood Glucose Less Than 70 - Patient Without IV Access - Unresponsive, NPO or Unable To Safely Swallow). - Subcutaneous    heparin 86047 units/250 mL (100 units/mL) in 0.45 % NaCl infusion 11 Units/kg/hr × 62.3 kg Titrated 12/29/2017     Sig - Route: Infuse 685 Units/hr into a venous catheter Dose Adjusted By Provider As Needed. - Intravenous    hydrALAZINE (APRESOLINE) injection 10 mg 10 mg Every 6 Hours PRN 12/22/2017     Sig - Route: Infuse 0.5 mL into a venous catheter Every 6 (Six) Hours As Needed for High Blood Pressure. - Intravenous    insulin lispro (humaLOG) injection 0-7 Units 0-7 Units 4 Times Daily With Meals & Nightly 12/22/2017     Sig - Route: Inject 0-7 Units under the skin 4 (Four) Times a Day With Meals & at Bedtime. - Subcutaneous    levETIRAcetam (KEPPRA) tablet 500 mg 500 mg Every 12 Hours Scheduled 12/31/2017     Sig - Route: Take 1 tablet by mouth Every 12 (Twelve) Hours. - Oral    metoprolol tartrate (LOPRESSOR) half tablet 12.5 mg 12.5 mg Every 12 Hours Scheduled 12/23/2017     Sig - Route: Take 1 half tablet by mouth Every 12 (Twelve) Hours. - Oral    Pharmacy to Dose Heparin  Continuous PRN 12/29/2017     Sig - Route: Continuous As Needed for Consult. - Does not apply    Pharmacy to dose warfarin  Continuous PRN  2018     Sig - Route: Continuous As Needed for Consult. - Does not apply    potassium chloride (KLOR-CON) packet 40 mEq 40 mEq As Needed 2017     Sig - Route: Take 40 mEq by mouth As Needed (potassium replacement, see admin instructions). - Oral    potassium chloride (MICRO-K) CR capsule 40 mEq 40 mEq As Needed 2017     Sig - Route: Take 4 capsules by mouth As Needed (potassium replacement.  see admin instructions). - Oral    predniSONE (DELTASONE) tablet 7.5 mg 7.5 mg Daily With Breakfast 2017     Sig - Route: Take 1.5 tablets by mouth Daily With Breakfast. - Oral    sodium chloride 0.9 % flush 1-10 mL 1-10 mL As Needed 2017     Sig - Route: Infuse 1-10 mL into a venous catheter As Needed for Line Care. - Intravenous    sodium chloride 0.9 % flush 10 mL 10 mL As Needed 2017     Sig - Route: Infuse 10 mL into a venous catheter As Needed for Line Care. - Intravenous    Cosign for Ordering: Accepted by Chico Negrete MD on 2017  7:47 PM    warfarin (COUMADIN) tablet 2.5 mg 2.5 mg Daily Warfarin 2018     Sig - Route: Take 1 tablet by mouth Daily. - Oral             Physician Progress Notes (most recent note)      Dahlia Angel MD at 2018  9:28 AM  Version 1 of 1             [facility] Medicine Services  PROGRESS NOTE    Patient Name: Lois Brennan  : 1940  MRN: [id number]    Date of Admission: 2017  Length of Stay: 11  Primary Care Physician: No Known Provider    Subjective   Subjective     CC: f/u for PE, and intracranial hemmorrhage    HPI: No acute events overnight, patient state that she had a good night, she has no new complaints    Review of Systems  Gen- No fevers, chills  CV- No chest pain, palpitations  Resp- No cough, dyspnea  GI- No N/V/D, abd pain    Otherwise ROS is negative except as mentioned in the HPI.    Objective   Objective     Vital Signs:   Temp:  [97.4 °F (36.3 °C)-98.5 °F (36.9 °C)] 97.9 °F (36.6  °C)  Heart Rate:  [53-99] 53  Resp:  [18-22] 18  BP: (128-145)/(67-95) 128/95  Total (NIH Stroke Scale): 6     Physical Exam:  Constitutional: No acute distress, awake, alert, seated in chair  HENT: NCAT, mucous membranes moist  Respiratory: Clear to auscultation bilaterally, respiratory effort normal   Cardiovascular: RRR, no murmurs, rubs, or gallops, palpable pedal pulses bilaterally  Gastrointestinal: Positive bowel sounds, soft, nontender, nondistended  Musculoskeletal: No bilateral ankle edema  Psychiatric: Appropriate affect, cooperative  Neurologic: Oriented x 3, LUE weakness, left facial droop, speech clear but slighted slurred  Skin: No rashes    Results Reviewed:  I have personally reviewed current lab, radiology, and data and agree.      Results from last 7 days  Lab Units 01/02/18 0527 01/01/18 0730 12/31/17  0514   WBC 10*3/mm3  --   --  7.77   HEMOGLOBIN g/dL  --   --  12.0   HEMATOCRIT %  --   --  36.1   PLATELETS 10*3/mm3  --   --  201   INR  1.03 1.07  --        Results from last 7 days  Lab Units 12/31/17 2002 12/31/17  0514 12/28/17  1549   SODIUM mmol/L  --  138  --    POTASSIUM mmol/L 4.7 3.4*  --    CHLORIDE mmol/L  --  104  --    CO2 mmol/L  --  27.0  --    BUN mg/dL  --  19  --    CREATININE mg/dL  --  1.10  --    GLUCOSE mg/dL  --  84  --    CALCIUM mg/dL  --  8.9  --    TROPONIN I ng/mL  --   --  0.031     No results found for: BNP  No results found for: PHART    Microbiology Results Abnormal     None        Results for orders placed during the hospital encounter of 12/09/17   Adult Transthoracic Echo Complete W/ Cont if Necessary Per Protocol (With Agitated Saline)    Narrative · Left ventricular systolic function is normal. Estimated EF = 70%.  · Normal right ventricular cavity size, wall thickness, systolic function   and septal motion noted.  · Saline test results are negative.  · The aortic valve exhibits sclerosis.  · No evidence of pulmonary hypertension is present.  · There is  no evidence of pericardial effusion.          I have reviewed the medications.    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 She had a preceding episode of transient facial weakness earlier that week.  CT scan revealed a 3.7 x 3.8 cm frontal hemorrhage with some right to left shift and a smaller posterior and superior hemorrhage.  She had atrial fibrillation during her echocardiogram.  She eventually was discharged to rehabilitation on 12/14/2017.      She was readmitted on 12/22/2017 after being transferred back from [facility] after developing headache, worsening mental status and worsening left-sided weakness.  There was concern for seizures.   She was doing well during the hospitalization and subsequently developed shortness of breath.  A CT angiogram done on 12/29/2017 showed an acute pulmonary embolism in the right main pulmonary artery and lower lobe pulmonary arteries.  She was  started on a heparin drip and transferred to the intensive care unit. Neurosurgery was consulted and were agreeable. Patient was deemed stable and later transferred to floor for continued care.     Assessment & Plan:  - Pulmonary embolism (right main and RLL pulmonary arteries) currently on heparin gtt, pulm and NS agreeable to continue anticoagulate, coumadin is recommend and was started 1/1/2018 pharmacy following, goal 2.5-3, heparin gtt  - Intracranial hemorrhage - hx of CVA with hemorrhagic conversion, followed by neurosurgery, risk for re-hemorrhage still present, but risks for having more PE outweigh this, as such ok to AC. will need repeat CT head before discharge.  - Suspected PAF, s/p amio, cardiology following, if afib seen on monitor we should get EKG  - Hx of HTN- this is well controlled  - Continue Keppra for sz  - well controlled T2DM A1C 7, continue ssi  - Pharyngeal dysphagia, continue level 3 diet     Complex case     DVT Prophylaxis: Heparin gtt/coumadin     CODE STATUS: Full Code     Disposition: anticipate d/c in next few days to  [facility],  following    Dahlia Angel MD  01/02/18  9:34 AM         Electronically signed by Dahlia Angel MD at 1/2/2018  9:34 AM           Consult Notes (most recent note)      Dominique Lopez DO at 12/29/2017  4:34 PM  Version 1 of 1         Pulmonary New Patient Evaluation     REFERRING PHYSICIAN:  Dr. Taylor    Subjective     Chief Complaint: Shortness of Breath        SUBJECTIVE     Ms. Brennan is a 78yo F with a history of recent CVA with hemorrhagic conversion approximately 3 weeks ago who was readmitted to [facility] on 12/22 for acute change in mental status. She had a repeat CT scan of her head which showed improvement. On 12/28, she reported that she was short of breath. There was concern for PE by the primary service and a CT PE protocol was performed today which showed pulmonary emboli in the right main pulmonary artery as well as some segmental  branches. She currently denies any shortness of breath or chest pain. She is not tachycardic, nor hypoxic and her blood pressure is normal.       PMH: She  has a past medical history of Dementia; Diabetes mellitus; GERD (gastroesophageal reflux disease); Hypertension; Rheumatoid arthritis; and TIA (transient ischemic attack).   PSxH: She  has a past surgical history that includes Eye surgery; Cataract extraction, bilateral; and Cardiac catheterization.        Medications:     Current Facility-Administered Medications:   •  acetaminophen (TYLENOL) tablet 650 mg, 650 mg, Oral, Q4H PRN, Yadira Shenandoah, APRN, 650 mg at 12/26/17 2119  •  amLODIPine (NORVASC) tablet 2.5 mg, 2.5 mg, Oral, Daily, Lara M Marivel II, DO, 2.5 mg at 12/29/17 0825  •  atorvastatin (LIPITOR) tablet 10 mg, 10 mg, Oral, Nightly, Lara M Marivel II, DO, 10 mg at 12/28/17 2104  •  bisacodyl (DULCOLAX) suppository 10 mg, 10 mg, Rectal, Daily PRN, Yadira Sreedhar, APRN, 10 mg at 12/26/17 1519  •  dextrose (D50W) solution 25 g, 25 g, Intravenous, Q15 Min PRN, Yadira Sreedhar, APRN  •  dextrose (GLUTOSE) oral gel 15 g, 15 g, Oral, Q15 Min PRN, Yadira Shenandoah, APRN  •  donepezil (ARICEPT) tablet 10 mg, 10 mg, Oral, Nightly, Lara M Marivel II, DO, 10 mg at 12/28/17 2104  •  famotidine (PEPCID) tablet 20 mg, 20 mg, Oral, BID, Lara M Marivel II, DO, 20 mg at 12/29/17 0825  •  glucagon (human recombinant) (GLUCAGEN DIAGNOSTIC) injection 1 mg, 1 mg, Subcutaneous, Q15 Min PRN, Yadira Shenandoah, APRN  •  hydrALAZINE (APRESOLINE) injection 10 mg, 10 mg, Intravenous, Q6H PRN, Yadira Shenandoah, APRN  •  insulin lispro (humaLOG) injection 0-7 Units, 0-7 Units, Subcutaneous, 4x Daily With Meals & Nightly, Yadira Shenandoah, APRN, 2 Units at 12/29/17 1228  •  levETIRAcetam in NaCl 0.82% (KEPPRA) IVPB 500 mg, 500 mg, Intravenous, Q12H, SHYANN Castellanos, Last Rate: 400 mL/hr at 12/28/17 1821, 500 mg at 12/29/17 0605  •  metoprolol tartrate (LOPRESSOR) half tablet 12.5 mg, 12.5 mg, Oral, Q12H,  Lara Orta II, DO, 12.5 mg at 12/29/17 0825  •  predniSONE (DELTASONE) tablet 7.5 mg, 7.5 mg, Oral, Daily With Breakfast, Lara Orta II, DO, 7.5 mg at 12/29/17 0825  •  sodium chloride 0.9 % flush 1-10 mL, 1-10 mL, Intravenous, PRN, Yadira Sreedhar, APRN  •  sodium chloride 0.9 % flush 10 mL, 10 mL, Intravenous, PRN, Triage Protocol Emergency, MD    Allergies: She is allergic to ace inhibitors.   FH: Her family history includes Heart disease in her mother.   SH: She  reports that she has never smoked. She has never used smokeless tobacco. She reports that she does not drink alcohol or use illicit drugs.     The patient's relevant past medical, surgical and social history were reviewed and updated in Epic as appropriate.    ROS (14):   Review of Systems   Constitutional: Positive for fatigue.   HENT: Negative.    Eyes: Negative.    Respiratory: Negative for shortness of breath.    Cardiovascular: Negative for chest pain.   Gastrointestinal: Negative.    Endocrine: Negative.    Genitourinary: Negative.    Musculoskeletal: Negative.    Skin: Negative.    Allergic/Immunologic: Negative.    Neurological: Negative.    Hematological: Negative.    Psychiatric/Behavioral: Negative.            Objective   OBJECTIVE     Physical Examination   Vitals:    12/29/17 0100 12/29/17 0437 12/29/17 0708 12/29/17 1320   BP:  164/71 165/79 152/81   BP Location:  Right arm Right arm Right arm   Patient Position:  Lying Lying Lying   Pulse: 51 (!) 47 54 79   Resp:  18 18 18   Temp:  98.1 °F (36.7 °C) 98.3 °F (36.8 °C) 97.7 °F (36.5 °C)   TempSrc:  Oral Oral    SpO2: 95% 95% 96%    Weight:       Height:           Body mass index is 25.11 kg/(m^2).    Physical Examination    Constitutional:  No acute distress. Lying in bed.    Neck:  Normal nasal mucosa and turbinates.   Oropharynx clear.  Normal range of motion. Neck supple.   No JVD present. No tracheal deviation present.  No thyromegaly present.    Cardiovascular: Normal rate,  regular and rhythm. Normal heart sounds.  No murmurs, gallop or rub.  No peripheral edema.   Respiratory: No respiratory distress. Normal respiratory effort.  Normal breath sounds  Clear to ascultation and percussion.    Abdominal:  Soft. No masses. Non-tender. No distension. No HSM.   Extremities: No digital cyanosis. No clubbing.   Lymphadenopathy: None.   Skin: Skin is warm and dry. No rashes, lesions or ulcers noted.    Neurological:   Alert. Answers questions appropriately.     Psychiatric:  Normal affect. Crying because she is scared.      Diagnostic Data    CT Chest 12/29/2017 Personally reviewed. There is a filling defect in the right main pulmonary artery with extension in the segmental pulmonary arteries.          SpO2 Readings from Last 3 Encounters:   12/29/17 96%   12/15/17 97%       Assessment/Plan   ASSESSMENT / PLAN     Assessment:  Ms. Brennan is a 78yo F with a recent admission for CVA with hemorrhagic conversion. She represented on 12/22 with AMS and worsening weakness thought to be secondary to seizure. She developed shortness of breath and had a CT PE protocol performed which was positive for PE. She does not have any evidence of right heart strain on exam. She is in a tough situation with recent hemorrhagic conversion within the past 3 weeks.     Hospital Problem List     * (Principal)Other pulmonary embolism without acute cor pulmonale    Recent intracranial hemorrhage felt related to hemorrhagic conversion of embolic infarcts in setting of Afib    Overview Signed 12/22/2017  4:45 PM by SHYANN Castellanos     Diagnosed 12/9/17.           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 Recommend anticoagulation for PE if no clinical contraindications.   2. Recommend heparin drip without bolus if she can be anticoagulated from a neurosurgery perspective. She is at risk for repeat cerebral hemorrhage and will require monitoring for changes in her neurologic status.   3. If the patient cannot be anticoagulated and she has lower extremity clots, she would be a candidate for IVC filter placement.       I discussed the diagnosis, evaluation, and  treatment options with the patient and/or appropriate family members.    Thank you very much for allowing me to participate in the care of your patient.    I spent 30 minutes with the patient. I spent > 50% percent of this time counseling and discussing diagnosis and management.    Dominique Lopez DO  Pulmonary and Critical Care Medicine    C.C.:  Patient Care Team:  No Known Provider as PCP - General         Electronically signed by Dominique Lopez DO at 2017  4:50 PM           Physical Therapy Notes (most recent note)      Zina Enamorado PTA at 2018 11:36 AM  Version 1 of 1         Acute Care - Physical Therapy Treatment Note  [facility]     Patient Name: Lois Brennan  : 1940  MRN: [id number]  Today's Date: 2018  Onset of Illness/Injury or Date of Surgery Date: 17  Date of Referral to PT: 17  Referring Physician: SHYANN Eli    Admit Date: 2017    Visit Dx:    ICD-10-CM ICD-9-CM   1. Pharyngeal dysphagia R13.13 787.23   2. Left-sided weakness R53.1 728.87   3. Somnolence R40.0 780.09   4. 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 Paroxysmal atrial fibrillation I48.0 427.31   6. Diabetes mellitus type 2 in obese E11.69 250.00    E66.9 278.00   7. Hypertension, unspecified type I10 401.9   8. Impaired mobility and ADLs Z74.09 799.89   9.  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   • Other pulmonary embolism without acute cor pulmonale               Adult Rehabilitation Note       01/02/18 1033          Rehab Assessment/Intervention    Discipline physical therapy assistant  -UD      Document Type therapy note (daily note)  -UD      Subjective Information agree to therapy;complains of;weakness;pain  -UD      Patient Effort, Rehab Treatment adequate  -UD      Symptoms Noted During/After Treatment increased pain  -UD      Precautions/Limitations fall precautions  -UD      Recorded by [UD] Zina Enamorado PTA      Vital Signs    O2 Delivery Post Treatment room air  -UD      Pre Patient Position Sitting  -UD      Intra Patient Position Standing  -UD      Post Patient Position Supine  -UD      Recorded by [UD] Zina Enamorado PTA      Pain Assessment    Pain Assessment Torres-Montano FACES  -UD      Torres-Montano FACES Pain Rating 4  -UD      Pain Score 4  -UD      Post Pain Score 6  -UD      Pain Type Acute pain  -UD      Pain Location Hip  -UD      Pain Orientation Right  -UD      Pain Intervention(s) Repositioned  -UD      Recorded by [UD] Zina Enamorado PTA      Cognitive Assessment/Intervention    Orientation Status oriented to;person  -UD      Follows Commands/Answers Questions 75% of the time  -UD      Personal Safety Interventions fall prevention program maintained  -UD      Recorded by [UD] Zina Enamorado PTA      Bed  Mobility, Assessment/Treatment    Bed Mob, Sit to Supine, [place] maximum assist (25% patient effort);2 person assist required  -UD      Recorded by [UD] Zina Enamorado PTA      Transfer Assessment/Treatment    Transfers, Sit-Stand [place] maximum assist (25% patient effort);2 person assist required  -UD      Transfers, Stand-Sit [place] maximum assist (25% patient effort);2 person assist required  -UD      Recorded by [UD] Zina Enamorado PTA      Gait Assessment/Treatment    Gait, [place] Level other (see comments);maximum assist (25% patient effort);2 person assist required   just 2-3 steps back to bed  -UD      Recorded by [UD] Zina Enamorado PTA      Therapy Exercises    Bilateral Lower Extremities AROM:;10 reps;sitting  -UD      Recorded by [UD] Zina Enamorado PTA      Positioning and Restraints    Pre-Treatment Position sitting in chair/recliner  -UD      Post Treatment Position bed  -UD      In Bed notified nsg;supine;call light within reach;exit alarm on;side rails up x2  -UD      Recorded by [UD] Zina Enamorado PTA        User Key  (r) = Recorded By, (t) = Taken By, (c) = Cosigned By    Initials Name Effective Dates    UD Zina Enamorado PTA 06/22/15 -                 IP PT Goals       01/02/18 1134 12/30/17 1156 12/28/17 1227    Bed Mobility PT LTG    Bed Mobility PT LTG, Date Established  12/30/17  -MISSY     Bed Mobility PT LTG, Time to Achieve  2 wks  -MISSY     Bed Mobility PT LTG, Activity Type  supine to sit/sit to supine  -MISSY     Bed Mobility PT LTG, [place] Level  contact guard assist  -MISSY     Bed Mobility PT LTG, Outcome goal ongoing  -UD goal revised  -MISSY goal met  -UD    Transfer Training 2 PT LTG    Transfer Training PT 2 LTG, Date Established  12/30/17  -MISSY     Transfer Training PT 2 LTG, Time to Achieve  2 wks  -MISSY     Transfer Training PT 2 LTG, Activity Type  sit to stand/stand to sit  -MISSY     Transfer Training PT 2 LTG, [place] Level  minimum assist (75% patient effort)   -MISSY     Transfer Training PT 2 LTG, Outcome goal ongoing  -UD goal revised  -MISSY goal met  -UD    Gait Training PT LTG    Gait Training Goal PT LTG, Date Established  12/30/17  -MISSY     Gait Training Goal PT LTG, Time to Achieve  2 wks  -MISSY     Gait Training Goal PT LTG, [place] Level  moderate assist (50% patient effort)  -MISSY     Gait Training Goal PT LTG, Distance to Achieve  50  -MISSY     Gait Training Goal PT LTG, Outcome goal ongoing  -UD goal revised  -MISSY goal ongoing  -UD      12/26/17 1531 12/23/17 1356       Bed Mobility PT LTG    Bed Mobility PT LTG, Date Established  12/23/17  -MJ     Bed Mobility PT LTG, Time to Achieve  5 days  -MJ     Bed Mobility PT LTG, Activity Type  supine to sit/sit to supine  -MJ     Bed Mobility PT LTG, [place] Level  moderate assist (50% patient effort);2 person assist required  -MJ     Bed Mobility PT LTG, Date Goal Reviewed  12/23/17  -MJ     Bed Mobility PT LTG, Outcome goal ongoing  -SC goal ongoing  -MJ     Transfer Training PT LTG    Transfer Training PT LTG, Date Established  12/23/17  -MJ     Transfer Training PT LTG, Time to Achieve  1 wk  -MJ     Transfer Training PT LTG, Activity Type  sit to stand/stand to sit  -MJ     Transfer Training PT LTG, [place] Level  moderate assist (50% patient effort);2 person assist required  -MJ     Transfer Training PT LTG, Assist Device  --   appropriate AD  -MJ     Transfer Training PT  LTG, Date Goal Reviewed  12/23/17  -MJ     Transfer Training PT LTG, Outcome goal met  -SC goal ongoing  -MJ     Transfer Training 2 PT LTG    Transfer Training PT 2 LTG, Date Established  12/23/17  -MJ     Transfer Training PT 2 LTG, Time to Achieve  1 wk  -MJ     Transfer Training PT 2 LTG, Activity Type  bed to chair /chair to bed  -MJ     Transfer Training PT 2 LTG, [place] Level  moderate assist (50% patient effort);2 person assist required  -MJ     Transfer Training PT 2 LTG, Assist Device  --   appropriate AD  -MJ     Transfer  Training PT 2 LTG, Date Goal Reviewed  12/23/17  -MJ     Transfer Training PT 2 LTG, Outcome  goal ongoing  -MJ     Gait Training PT LTG    Gait Training Goal PT LTG, Date Established  12/23/17  -MJ     Gait Training Goal PT LTG, Time to Achieve  1 wk  -MJ     Gait Training Goal PT LTG, [place] Level  maximum assist (25% patient effort);2 person assist required  -MJ     Gait Training Goal PT LTG, Assist Device  --   appropriated AD  -MJ     Gait Training Goal PT LTG, Distance to Achieve  10 feet  -MJ     Gait Training Goal PT LTG, Date Goal Reviewed  12/23/17  -MJ     Gait Training Goal PT LTG, Outcome  goal ongoing  -MJ       User Key  (r) = Recorded By, (t) = Taken By, (c) = Cosigned By    Initials Name Provider Type    DACIA Marsh, PT Physical Therapist    MISSY Rosa, PT Physical Therapist    MELODY Enamorado, PTA Physical Therapy Assistant    SABI Gibson, PT Physical Therapist          Physical Therapy Education     Title: PT OT SLP Therapies (Active)     Topic: Physical Therapy (Active)     Point: Mobility training (Active)    Learning Progress Summary    Learner Readiness Method Response Comment Documented by Status   Patient Acceptance E,D NR  UD 01/02/18 1133 Active    Acceptance E NR  MISSY 12/30/17 1156 Active    Acceptance E NR  KS 12/29/17 1000 Active    Acceptance E,D DU,NR   12/28/17 1227 Done    Eager E SENDY,NR,VU reviewed benefits of activity SC 12/26/17 1531 Done    Acceptance E VU  EM 12/26/17 0423 Done    Acceptance E,D NR   12/23/17 1355 Active   Family Acceptance E NR  KS 12/29/17 1000 Active    Acceptance E,D VU,NR   12/23/17 1355 Done               Point: Home exercise program (Active)    Learning Progress Summary    Learner Readiness Method Response Comment Documented by Status   Patient Acceptance E,D NR  UD 01/02/18 1133 Active    Acceptance E NR  MISSY 12/30/17 1156 Active    Acceptance E NR  KS 12/29/17 1000 Active    Acceptance E,D DU,NR   12/28/17 1227 Done     Michael KABA,NR,VU reviewed benefits of activity SC 12/26/17 1531 Done    Acceptance E VU   12/26/17 0423 Done   Family Acceptance E NR  KS 12/29/17 1000 Active               Point: Body mechanics (Active)    Learning Progress Summary    Learner Readiness Method Response Comment Documented by Status   Patient Acceptance E,D NR   01/02/18 1133 Active    Acceptance E NR   12/30/17 1156 Active    Acceptance E NR  KS 12/29/17 1000 Active    Acceptance E,D DU,NR   12/28/17 1227 Done    Eager LISY KABA,NR,VU reviewed benefits of activity SC 12/26/17 1531 Done    Acceptance E VU  EM 12/26/17 0423 Done    Acceptance E,D NR   12/23/17 1355 Active   Family Acceptance E NR  KS 12/29/17 1000 Active    Acceptance E,D VU,NR   12/23/17 1355 Done               Point: Precautions (Active)    Learning Progress Summary    Learner Readiness Method Response Comment Documented by Status   Patient Acceptance E,D NR   01/02/18 1133 Active    Acceptance E NR   12/30/17 1156 Active    Acceptance E NR  KS 12/29/17 1000 Active    Acceptance E,D DU,NR   12/28/17 1227 Done    Eager LISY KABA,NR,VU reviewed benefits of activity SC 12/26/17 1531 Done    Acceptance E VU   12/26/17 0423 Done    Acceptance E,D NR   12/23/17 1355 Active   Family Acceptance E NR  KS 12/29/17 1000 Active    Acceptance E,D VU,NR   12/23/17 1355 Done                      User Key     Initials Effective Dates Name Provider Type Discipline    SC 06/19/15 -  Mikey Marsh, PT Physical Therapist PT     06/19/15 -  Adwoa Rosa, PT Physical Therapist PT     06/22/15 -  Zina Enamorado, PTA Physical Therapy Assistant PT     03/14/16 -  Juan C Gibson, PT Physical Therapist PT    KS 09/18/16 -  Brina Kurtz, RN Registered Nurse Nurse     06/12/17 -  Halima Tavarez, RN Registered Nurse Nurse                    PT Recommendation and Plan  Anticipated Discharge Disposition: inpatient rehabilitation facility  Planned Therapy Interventions: balance training, bed  mobility training, gait training, home exercise program, patient/family education, strengthening, transfer training  PT Frequency: daily  Plan of Care Review  Plan Of Care Reviewed With: patient  Progress: no change  Outcome Summary/Follow up Plan: pt confused,up in chair.needs max. assist for transfers,went back to bed.          Outcome Measures       01/02/18 1033          How much help from another person do you currently need...    Turning from your back to your side while in flat bed without using bedrails? 2  -UD      Moving from lying on back to sitting on the side of a flat bed without bedrails? 2  -UD      Moving to and from a bed to a chair (including a wheelchair)? 2  -UD      Standing up from a chair using your arms (e.g., wheelchair, bedside chair)? 2  -UD      Climbing 3-5 steps with a railing? 1  -UD      To walk in hospital room? 2  -UD      AM-PAC 6 Clicks Score 11  -UD        User Key  (r) = Recorded By, (t) = Taken By, (c) = Cosigned By    Initials Name Provider Type    MELODY Enamorado PTA Physical Therapy Assistant           Time Calculation:         PT Charges       01/02/18 1135          Time Calculation    PT Received On 01/02/18  -      PT Goal Re-Cert Due Date 01/02/18  -      Time Calculation- PT    Total Timed Code Minutes- PT 15 minute(s)  -UD        User Key  (r) = Recorded By, (t) = Taken By, (c) = Cosigned By    Initials Name Provider Type    MELODY Enamorado PTA Physical Therapy Assistant          Therapy Charges for Today     Code Description Service Date Service Provider Modifiers Qty    68460280338 HC PT THER PROC EA 15 MIN 1/2/2018 Zina Enamorado PTA GP 1          PT G-Codes  Outcome Measure Options: AM-PAC 6 Clicks Basic Mobility (PT)    Zina Enamorado PTA  1/2/2018          Electronically signed by Zina Enamorado PTA at 1/2/2018 11:36 AM           Occupational Therapy Notes (most recent note)      Selina Dowell OT at 1/2/2018 11:41 AM  Version 1 of 1         Acute Care -  Occupational Therapy Progress Note  [facility]     Patient Name: Lois Brennan  : 1940  MRN: [id number]  Today's Date: 2018  Onset of Illness/Injury or Date of Surgery Date: 17  Date of Referral to OT: 17  Referring Physician: SHYANN Eli      Admit Date: 2017    Visit Dx:     ICD-10-CM ICD-9-CM   1. Pharyngeal dysphagia R13.13 787.23   2. Left-sided weakness R53.1 728.87   3. Somnolence R40.0 780.09   4. 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 Paroxysmal atrial fibrillation I48.0 427.31   6. Diabetes mellitus type 2 in obese E11.69 250.00    E66.9 278.00   7. Hypertension, unspecified type I10 401.9   8. Impaired mobility and ADLs Z74.09 799.89   9. 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   • Other pulmonary embolism without acute cor pulmonale             Adult Rehabilitation Note       18 1038 18 1033       Rehab Assessment/Intervention    Discipline occupational therapist  -AN physical therapy assistant  -UD     Document Type therapy note (daily note)  -AN therapy note (daily note)  -UD     Subjective Information agree to therapy;complains of;pain  -AN agree to therapy;complains of;weakness;pain  -UD     Patient Effort, Rehab Treatment adequate  -AN adequate  -UD     Symptoms Noted During/After Treatment increased pain  -AN increased pain  -UD     Precautions/Limitations fall  precautions;other (see comments)   exit alarma  -AN fall precautions  -UD     Specific Treatment Considerations l side neglect, labile emotionally  -AN      Recorded by [AN] Selina Dowell OT [UD] Zina Enamorado PTA     Vital Signs    O2 Delivery Post Treatment  room air  -UD     Pre Patient Position  Sitting  -UD     Intra Patient Position  Standing  -UD     Post Patient Position  Supine  -UD     Recorded by  [UD] Zina Enamorado PTA     Pain Assessment    Pain Assessment Torres-Montano FACES  -AN Torres-Montano FACES  -UD     Torres-Montano FACES Pain Rating 4  -AN 4  -UD     Pain Score 3  -AN 4  -UD     Post Pain Score 2  -AN 6  -UD     Pain Type Acute pain  -AN Acute pain  -UD     Pain Location Hip  -AN Hip  -UD     Pain Orientation Right  -AN Right  -UD     Pain Intervention(s) Repositioned  -AN Repositioned  -UD     Response to Interventions tolerated, improved  -AN      Recorded by [AN] Selina Dowell OT [UD] Zina Enamorado PTA     Vision Assessment/Intervention    Visual Impairment inattention to the left  -AN      Recorded by [AN] Selina Dowell OT      Cognitive Assessment/Intervention    Orientation Status oriented to;person  -AN oriented to;person  -UD     Follows Commands/Answers Questions 75% of the time;needs cueing;needs increased time  -AN 75% of the time  -UD     Personal Safety moderate impairment  -AN      Personal Safety Interventions fall prevention program maintained  -AN fall prevention program maintained  -UD     Recorded by [AN] Selina Dowell OT [UD] Zina Enamorado PTA     Bed Mobility, Assessment/Treatment    Bed Mob, Sit to Supine, [place] maximum assist (25% patient effort);2 person assist required  -AN maximum assist (25% patient effort);2 person assist required  -UD     Recorded by [AN] Selina Dowell OT [UD] Zina Enamorado PTA     Transfer Assessment/Treatment    Transfers, Chair-Bed [place] maximum assist (25% patient effort);2 person assist required  -AN      Transfers, Sit-Stand  [place] maximum assist (25% patient effort);2 person assist required  -AN maximum assist (25% patient effort);2 person assist required  -UD     Transfers, Stand-Sit [place] maximum assist (25% patient effort);2 person assist required  -AN maximum assist (25% patient effort);2 person assist required  -UD     Recorded by [AN] Selina Dowell OT [UD] Zina Enamorado PTA     Gait Assessment/Treatment    Gait, [place] Level  other (see comments);maximum assist (25% patient effort);2 person assist required   just 2-3 steps back to bed  -UD     Recorded by  [UD] Zina Enamorado PTA     Balance Skills Training    Sitting-Level of Assistance Minimum assistance  -AN      Sitting-Balance Support Feet supported  -AN      Sitting-Balance Activities Trunk control activities  -AN      Sitting # of Minutes 3  -AN      Standing-Level of Assistance Moderate assistance;x2  -AN      Recorded by [AN] Selina Dowell OT      Therapy Exercises    Bilateral Lower Extremities  AROM:;10 reps;sitting  -UD     Left Upper Extremity AAROM:;5 reps;10 reps;sitting;elbow flexion/extension;hand pumps;shoulder horizontal abd/add  -AN      Recorded by [AN] Selina Dowell OT [UD] Zina Enamorado PTA     Positioning and Restraints    Pre-Treatment Position sitting in chair/recliner  -AN sitting in chair/recliner  -UD     Post Treatment Position bed  -AN bed  -UD     In Bed supine;call light within reach;encouraged to call for assist;exit alarm on  -AN notified nsg;supine;call light within reach;exit alarm on;side rails up x2  -UD     Recorded by [STEPHANIE] Selina Dowell OT [UD] Zina Enmaorado PTA       User Key  (r) = Recorded By, (t) = Taken By, (c) = Cosigned By    Initials Name Effective Dates    MELODY Enamorado PTA 06/22/15 -     STEPHANIE Dowell OT 06/22/15 -                 OT Goals       12/23/17 1439          Transfer Training OT LTG    Transfer Training OT LTG, Date Established 12/23/17  -MISSY      Transfer Training OT LTG, Time to Achieve 1  wk  -MISSY      Transfer Training OT LTG, Activity Type sit to stand/stand to sit  -MISSY      Transfer Training OT LTG, [place] Level moderate assist (50% patient effort);2 person assist required  -MISSY      Transfer Training OT LTG, Assist Device --   UE suppport/gait belt  -MISSY      Transfer Training OT LTG, Outcome goal ongoing  -MISSY      Strength OT LTG    Strength Goal OT LTG, Date Established 12/23/17  -MISSY      Strength Goal OT LTG, Time to Achieve 1 wk  -MISSY      Strength Goal OT LTG, Measure to Achieve Pt. participate in LUE ROM and WB and RUE ROM each session to support ADL indep.  -MISSY      Strength Goal OT LTG, Outcome goal ongoing  -MISSY      Attention to Task OT LTG    Attention to Task OT LTG 1, Date Established 12/23/17  -MISSY      Attention to Task OT LTG 1, Time to Achieve 1 wk  -MISSY      Attention to Task OT LTG 1, Activity Type 1 minute   attend to task, midline  -MISSY      Attention to Task Goal OT LTG 1, [place] Level minimum assist (75% patient effort)  -MISSY      Attention to Task OT LTG 1, Outcome goal ongoing  -MISSY      Grooming OT LTG    Grooming Goal OT LTG, Date Established 12/23/17  -MISSY      Grooming Goal OT LTG, Time to Achieve 1 wk  -MISSY      Grooming Goal OT LTG, Activity Type min assist wash face and comb hair  -MISSY      Grooming Goal OT LTG, [place] Level minimum assist (75% patient effort);moderate assist (50% patient effort)   chetna tech  -MISSY      Grooming Goal OT LTG, Outcome goal ongoing  -MISSY        User Key  (r) = Recorded By, (t) = Taken By, (c) = Cosigned By    Initials Name Provider Type    MISSY Perdomo OT Occupational Therapist          Occupational Therapy Education     Title: PT OT SLP Therapies (Active)     Topic: Occupational Therapy (Active)     Point: ADL training (Active)    Description: Instruct learner(s) on proper safety adaptation and remediation techniques during self care or transfers.   Instruct in proper use of assistive devices.    Learning Progress Summary     Learner Readiness Method Response Comment Documented by Status   Patient Acceptance E,D NR Educated on pt posture and positioning for increased balance. AN 01/02/18 1137 Active    Acceptance E NR  KS 12/29/17 1000 Active    Acceptance E VU  EM 12/26/17 0423 Done    Acceptance E,D NR role OT, reason for consult, noted deficits, balance positioning, benefits activity, L sided attention MISSY 12/23/17 1437 Active   Family Acceptance E NR  KS 12/29/17 1000 Active    Acceptance E,D NR role OT, reason for consult, noted deficits, balance positioning, benefits activity, L sided attention MISSY 12/23/17 1437 Active               Point: Home exercise program (Active)    Description: Instruct learner(s) on appropriate technique for monitoring, assisting and/or progressing therapeutic exercises/activities.    Learning Progress Summary    Learner Readiness Method Response Comment Documented by Status   Patient Acceptance E NR  KS 12/29/17 1000 Active    Acceptance E VU  EM 12/26/17 0423 Done   Family Acceptance E NR  KS 12/29/17 1000 Active               Point: Precautions (Active)    Description: Instruct learner(s) on prescribed precautions during self-care and functional transfers.    Learning Progress Summary    Learner Readiness Method Response Comment Documented by Status   Patient Acceptance E NR  KS 12/29/17 1000 Active    Acceptance E VU  EM 12/26/17 0423 Done    Acceptance E,D NR role OT, reason for consult, noted deficits, balance positioning, benefits activity, L sided attention  12/23/17 1437 Active   Family Acceptance E NR  KS 12/29/17 1000 Active    Acceptance E,D NR role OT, reason for consult, noted deficits, balance positioning, benefits activity, L sided attention  12/23/17 1437 Active               Point: Body mechanics (Active)    Description: Instruct learner(s) on proper positioning and spine alignment during self-care, functional mobility activities and/or exercises.    Learning Progress Summary    Learner  Readiness Method Response Comment Documented by Status   Patient Acceptance E NR  KS 12/29/17 1000 Active    Acceptance E VU  EM 12/26/17 0423 Done    Acceptance E,D NR role OT, reason for consult, noted deficits, balance positioning, benefits activity, L sided attention  12/23/17 1437 Active   Family Acceptance E NR  KS 12/29/17 1000 Active    Acceptance E,D NR role OT, reason for consult, noted deficits, balance positioning, benefits activity, L sided attention  12/23/17 1437 Active                      User Key     Initials Effective Dates Name Provider Type Discipline     06/22/15 -  Karon Perdomo, OT Occupational Therapist OT    AN 06/22/15 -  Selina Dowell OT Occupational Therapist OT    KS 09/18/16 -  Brina Kurtz RN Registered Nurse Nurse     06/12/17 -  Halima Tavarez, RN Registered Nurse Nurse                  OT Recommendation and Plan  Anticipated Equipment Needs At Discharge:  (will allow rehab to determine )  Anticipated Discharge Disposition: inpatient rehabilitation facility  Plan of Care Review  Plan Of Care Reviewed With: patient  Progress: no change  Outcome Summary/Follow up Plan: Pt continues to need max assist x 2 with all txfrs and is confused and labile. Will continue to address L side weakness and ADL's. Pt will need SNF at discharge.        Outcome Measures       01/02/18 1038 01/02/18 1033       How much help from another person do you currently need...    Turning from your back to your side while in flat bed without using bedrails?  2  -UD     Moving from lying on back to sitting on the side of a flat bed without bedrails?  2  -UD     Moving to and from a bed to a chair (including a wheelchair)?  2  -UD     Standing up from a chair using your arms (e.g., wheelchair, bedside chair)?  2  -UD     Climbing 3-5 steps with a railing?  1  -UD     To walk in hospital room?  2  -UD     AM-PAC 6 Clicks Score  11  -UD     How much help from another is currently needed...    Putting on and  taking off regular lower body clothing? 1  -AN      Bathing (including washing, rinsing, and drying) 2  -AN      Toileting (which includes using toilet bed pan or urinal) 1  -AN      Putting on and taking off regular upper body clothing 1  -AN      Taking care of personal grooming (such as brushing teeth) 2  -AN      Eating meals 2  -AN      Score 9  -AN      Modified Morrill Scale    Modified Stanton Scale 4 - Moderately severe disability.  Unable to walk without assistance, and unable to attend to own bodily needs without assistance.  -AN        User Key  (r) = Recorded By, (t) = Taken By, (c) = Cosigned By    Initials Name Provider Type    MELODY Enamorado, PTA Physical Therapy Assistant    STEPHANIE Dowell OT Occupational Therapist           Time Calculation:         Time Calculation- OT       01/02/18 1140          Time Calculation- OT    OT Start Time 1038  -AN      Total Timed Code Minutes- OT 10 minute(s)  -AN      OT Received On 01/02/18  -AN      OT Goal Re-Cert Due Date 01/12/18  -AN        User Key  (r) = Recorded By, (t) = Taken By, (c) = Cosigned By    Initials Name Provider Type    STEPHANIE Dowell OT Occupational Therapist           Therapy Charges for Today     Code Description Service Date Service Provider Modifiers Qty    [id number]  OT THERAPEUTIC ACT EA 15 MIN 1/2/2018 Selina Dowell OT GO 1               Selina Dowell OT  1/2/2018     Electronically signed by Selina Dowell OT at 1/2/2018 11:41 AM

## 2018-01-02 NOTE — PROGRESS NOTES
"Pharmacy Consult  -  Warfarin    Lois Brennan is a  77 y.o. female   Height - 157.5 cm (62\")  Weight - 65 kg (143 lb 6.4 oz)    Consulting Provider: - Dr. Dahlia Angel (hospitalist)  Indication: - Pulmonary embolism  Goal INR: 2.5-3  Home Regimen: New start    Bridge Therapy: Yes, patient currently on therapeutic heparin    Drug-Drug Interactions with current regimen:  Currently receiving therapeutic heparin drip    Prednisone may increase risk of bleeding    Of note patient was taking amiodarone at home prior to admission    Warfarin Dosing During Admission:    Date  1/1 1/2          INR  1.07 1.03          Dose  2.5 mg (2.5 mg)               Education Provided:  Warfarin education provided verbally and in writing.  Understanding verified with patient teach back.      Labs:    Results from last 7 days   Lab Units 01/02/18  0527 01/01/18  2211 01/01/18  1407 01/01/18  0730 01/01/18  0102 12/31/17  1800 12/31/17  1211 12/31/17  0514   INR  1.03 --  --  1.07 --  --  --  --    APTT seconds 53.9 41.9* 42.7* 52.7 61.5* 68.3* 55.2 45.9   HEMOGLOBIN g/dL --  --  --  --  --  --  --  12.0   HEMATOCRIT % --  --  --  --  --  --  --  36.1   PLATELETS 10*3/mm3 --  --  --  --  --  --  --  201     Results from last 7 days   Lab Units 12/31/17 2002 12/31/17  0514   SODIUM mmol/L --  138   POTASSIUM mmol/L 4.7 3.4*   CHLORIDE mmol/L --  104   CO2 mmol/L --  27.0   BUN mg/dL --  19   CREATININE mg/dL --  1.10   CALCIUM mg/dL --  8.9   GLUCOSE mg/dL --  84       Assessment/Plan:   Patient is a new start on warfarin anticoagulation therapy, initiated on 1/01. INR today is subtherapeutic for the desired goal range. Will continue today with the current dose of warfarin 2.5 mg. Patient is on heparin drip to bridge until INR is therapeutic. Of note, patient has had 50% of dinner intake on 12/30 and 25% on 12/31 and 1/01, and per Cards, amiodarone therapy d/c on 12/29.     Pharmacy will continue to follow and make dose adjustments as " needed based on daily INR.      Thank you  Iman Winchester, PharmD Candidate 2018  1/2/2018  7:35 AM      Basilio Montano PharmD  Pharmacy Resident  1/2/2018  10:23 AM

## 2018-01-02 NOTE — PLAN OF CARE
Problem: Patient Care Overview (Adult)  Goal: Plan of Care Review  Outcome: Ongoing (interventions implemented as appropriate)   01/02/18 1138   Coping/Psychosocial Response Interventions   Plan Of Care Reviewed With patient   Outcome Evaluation   Outcome Summary/Follow up Plan Pt continues to need max assist x 2 with all txfrs and is confused and labile. Will continue to address L side weakness and ADL's. Pt will need SNF at discharge.   Patient Care Overview   Progress no change       Problem: Inpatient Occupational Therapy  Goal: Transfer Training Goal 1 LTG- OT  Outcome: Ongoing (interventions implemented as appropriate)   12/23/17 1439   Transfer Training OT LTG   Transfer Training OT LTG, Date Established 12/23/17   Transfer Training OT LTG, Time to Achieve 1 wk   Transfer Training OT LTG, Activity Type sit to stand/stand to sit   Transfer Training OT LTG, [place] Level moderate assist (50% patient effort);2 person assist required   Transfer Training OT LTG, Assist Device (UE suppport/gait belt)   Transfer Training OT LTG, Outcome goal ongoing     Goal: Strength Goal LTG- OT  Outcome: Ongoing (interventions implemented as appropriate)   12/23/17 1439   Strength OT LTG   Strength Goal OT LTG, Date Established 12/23/17   Strength Goal OT LTG, Time to Achieve 1 wk   Strength Goal OT LTG, Measure to Achieve Pt. participate in LUE ROM and WB and RUE ROM each session to support ADL indep.   Strength Goal OT LTG, Outcome goal ongoing     Goal: Attention to Task Goal 1 LTG- OT  Outcome: Ongoing (interventions implemented as appropriate)   12/23/17 1439   Attention to Task OT LTG   Attention to Task OT LTG 1, Date Established 12/23/17   Attention to Task OT LTG 1, Time to Achieve 1 wk   Attention to Task OT LTG 1, Activity Type 1 minute  (attend to task, midline)   Attention to Task Goal OT LTG 1, [place] Level minimum assist (75% patient effort)   Attention to Task OT LTG 1, Outcome goal ongoing     Goal:  Grooming Goal LTG- OT  Outcome: Ongoing (interventions implemented as appropriate)   12/23/17 1439   Grooming OT LTG   Grooming Goal OT LTG, Date Established 12/23/17   Grooming Goal OT LTG, Time to Achieve 1 wk   Grooming Goal OT LTG, Activity Type min assist wash face and comb hair   Grooming Goal OT LTG, [place] Level minimum assist (75% patient effort);moderate assist (50% patient effort)  (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

## 2018-01-03 LAB
APTT PPP: 45.9 SECONDS (ref 45–60)
APTT PPP: 57.2 SECONDS (ref 45–60)
APTT PPP: 72.1 SECONDS (ref 45–60)
GLUCOSE BLDC GLUCOMTR-MCNC: 103 MG/DL (ref 70–130)
GLUCOSE BLDC GLUCOMTR-MCNC: 139 MG/DL (ref 70–130)
GLUCOSE BLDC GLUCOMTR-MCNC: 162 MG/DL (ref 70–130)
GLUCOSE BLDC GLUCOMTR-MCNC: 169 MG/DL (ref 70–130)
INR PPP: 1.31
PROTHROMBIN TIME: 14.4 SECONDS (ref 9.6–11.5)

## 2018-01-03 PROCEDURE — 97110 THERAPEUTIC EXERCISES: CPT

## 2018-01-03 PROCEDURE — 85730 THROMBOPLASTIN TIME PARTIAL: CPT

## 2018-01-03 PROCEDURE — 85730 THROMBOPLASTIN TIME PARTIAL: CPT | Performed by: INTERNAL MEDICINE

## 2018-01-03 PROCEDURE — 25010000002 ONDANSETRON PER 1 MG: Performed by: INTERNAL MEDICINE

## 2018-01-03 PROCEDURE — 99233 SBSQ HOSP IP/OBS HIGH 50: CPT | Performed by: INTERNAL MEDICINE

## 2018-01-03 PROCEDURE — 82962 GLUCOSE BLOOD TEST: CPT

## 2018-01-03 PROCEDURE — 85610 PROTHROMBIN TIME: CPT | Performed by: INTERNAL MEDICINE

## 2018-01-03 PROCEDURE — 97116 GAIT TRAINING THERAPY: CPT

## 2018-01-03 PROCEDURE — 63710000001 PREDNISONE PER 5 MG: Performed by: INTERNAL MEDICINE

## 2018-01-03 RX ORDER — ONDANSETRON 2 MG/ML
4 INJECTION INTRAMUSCULAR; INTRAVENOUS EVERY 6 HOURS PRN
Status: DISCONTINUED | OUTPATIENT
Start: 2018-01-03 | End: 2018-01-05 | Stop reason: HOSPADM

## 2018-01-03 RX ORDER — PANTOPRAZOLE SODIUM 40 MG/1
40 TABLET, DELAYED RELEASE ORAL
Status: DISCONTINUED | OUTPATIENT
Start: 2018-01-03 | End: 2018-01-03

## 2018-01-03 RX ORDER — LORAZEPAM 0.5 MG/1
0.5 TABLET ORAL EVERY 6 HOURS PRN
Status: DISCONTINUED | OUTPATIENT
Start: 2018-01-03 | End: 2018-01-05 | Stop reason: HOSPADM

## 2018-01-03 RX ORDER — PANTOPRAZOLE SODIUM 40 MG/1
40 TABLET, DELAYED RELEASE ORAL
Status: DISCONTINUED | OUTPATIENT
Start: 2018-01-03 | End: 2018-01-05 | Stop reason: HOSPADM

## 2018-01-03 RX ADMIN — FAMOTIDINE 20 MG: 20 TABLET ORAL at 09:08

## 2018-01-03 RX ADMIN — INSULIN LISPRO 2 UNITS: 100 INJECTION, SOLUTION INTRAVENOUS; SUBCUTANEOUS at 17:31

## 2018-01-03 RX ADMIN — AMLODIPINE BESYLATE 5 MG: 5 TABLET ORAL at 09:09

## 2018-01-03 RX ADMIN — LORAZEPAM 0.5 MG: 0.5 TABLET ORAL at 14:39

## 2018-01-03 RX ADMIN — LEVETIRACETAM 500 MG: 500 TABLET, FILM COATED ORAL at 09:08

## 2018-01-03 RX ADMIN — LEVETIRACETAM 500 MG: 500 TABLET, FILM COATED ORAL at 21:04

## 2018-01-03 RX ADMIN — LORAZEPAM 0.5 MG: 0.5 TABLET ORAL at 21:04

## 2018-01-03 RX ADMIN — WARFARIN SODIUM 2.5 MG: 2.5 TABLET ORAL at 17:31

## 2018-01-03 RX ADMIN — METOPROLOL TARTRATE 12.5 MG: 25 TABLET, FILM COATED ORAL at 09:08

## 2018-01-03 RX ADMIN — INSULIN LISPRO 2 UNITS: 100 INJECTION, SOLUTION INTRAVENOUS; SUBCUTANEOUS at 12:53

## 2018-01-03 RX ADMIN — PANTOPRAZOLE SODIUM 40 MG: 40 TABLET, DELAYED RELEASE ORAL at 17:31

## 2018-01-03 RX ADMIN — ATORVASTATIN CALCIUM 10 MG: 10 TABLET, FILM COATED ORAL at 21:04

## 2018-01-03 RX ADMIN — ONDANSETRON 4 MG: 2 INJECTION INTRAMUSCULAR; INTRAVENOUS at 17:35

## 2018-01-03 RX ADMIN — METOPROLOL TARTRATE 12.5 MG: 25 TABLET, FILM COATED ORAL at 21:04

## 2018-01-03 RX ADMIN — PREDNISONE 7.5 MG: 5 TABLET ORAL at 09:09

## 2018-01-03 RX ADMIN — DONEPEZIL HYDROCHLORIDE 10 MG: 10 TABLET, FILM COATED ORAL at 21:04

## 2018-01-03 NOTE — PROGRESS NOTES
[facility] Medicine Services  PROGRESS NOTE    Patient Name: Lois Brennan  : 1940  MRN: [id number]    Date of Admission: 2017  Length of Stay: 12  Primary Care Physician: No Known Provider    Subjective   Subjective     CC: f/u for PE and intracranial hemmorrhage    HPI:No acute events overnight, patient states that she is feeling ok.     Review of Systems  Gen- No fevers, chills  CV- No chest pain, palpitations  Resp- No cough, dyspnea  GI- No N/V/D, abd pain  Otherwise ROS is negative except as mentioned in the HPI.    Objective   Objective     Vital Signs:   Temp:  [98.2 °F (36.8 °C)-98.3 °F (36.8 °C)] 98.2 °F (36.8 °C)  Heart Rate:  [] 74  Resp:  [16-18] 16  BP: (141-150)/(91-92) 141/91  Total (NIH Stroke Scale): 6     Physical Exam:  Constitutional: No acute distress, awake, alert, seating in chair  HENT: NCAT, mucous membranes moist  Respiratory: Clear to auscultation bilaterally, respiratory effort normal   Cardiovascular: RRR, no murmurs, rubs, or gallops, palpable pedal pulses bilaterally  Gastrointestinal: Positive bowel sounds, soft, nontender, nondistended  Musculoskeletal: No bilateral ankle edema  Psychiatric: Appropriate affect, cooperative  Neurologic: Oriented x 3, LUE weakness, left facial droop, speech slurred  Skin: No rashes    Results Reviewed:  I have personally reviewed current lab, radiology, and data and agree.      Results from last 7 days  Lab Units 18  0618  0514   WBC 10*3/mm3  --   --   --  7.77   HEMOGLOBIN g/dL  --   --   --  12.0   HEMATOCRIT %  --   --   --  36.1   PLATELETS 10*3/mm3  --   --   --  201   INR  1.31 1.03 1.07  --        Results from last 7 days  Lab Units 17  0514 17  1549   SODIUM mmol/L  --  138  --    POTASSIUM mmol/L 4.7 3.4*  --    CHLORIDE mmol/L  --  104  --    CO2 mmol/L  --  27.0  --    BUN mg/dL  --  19  --    CREATININE mg/dL  --  1.10   --    GLUCOSE mg/dL  --  84  --    CALCIUM mg/dL  --  8.9  --    TROPONIN I ng/mL  --   --  0.031     No results found for: BNP  No results found for: PHART      Results for orders placed during the hospital encounter of 12/09/17   Adult Transthoracic Echo Complete W/ Cont if Necessary Per Protocol (With Agitated Saline)    Narrative · Left ventricular systolic function is normal. Estimated EF = 70%.  · Normal right ventricular cavity size, wall thickness, systolic function   and septal motion noted.  · Saline test results are negative.  · The aortic valve exhibits sclerosis.  · No evidence of pulmonary hypertension is present.  · There is no evidence of pericardial effusion.          I have reviewed the medications.    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 She had a preceding episode of transient facial weakness earlier that week.  CT scan revealed a 3.7 x 3.8 cm frontal hemorrhage with some  right to left shift and a smaller posterior and superior hemorrhage.  She had atrial fibrillation during her echocardiogram.  She eventually was discharged to rehabilitation on 12/14/2017.      She was readmitted on 12/22/2017 after being transferred back from [facility] after developing headache, worsening mental status and worsening left-sided weakness.  There was concern for seizures.   She was doing well during the hospitalization and subsequently developed shortness of breath.  A CT angiogram done on 12/29/2017 showed an acute pulmonary embolism in the right main pulmonary artery and lower lobe pulmonary arteries.  She was started on a heparin drip and transferred to the intensive care unit. Neurosurgery was consulted and were agreeable. Patient was deemed stable and later transferred to floor for continued care.      Assessment & Plan:  - Pulmonary embolism (right main and RLL pulmonary arteries) currently on heparin gtt, pulm and NS agreeable to continue anticoagulate, coumadin is recommend and was started 1/1/2018 pharmacy following, goal 2.5-3, heparin gtt  - Intracranial hemorrhage - hx of CVA with hemorrhagic conversion, followed by neurosurgery, risk for re-hemorrhage still present, but risks for having more PE outweigh this, as such ok to AC. will need repeat CT head before discharge.  - Suspected PAF, s/p amio, cardiology following, if afib seen on monitor we should get EKG  - Hx of HTN- this is well controlled  - Continue Keppra for sz  - well controlled T2DM A1C 7, continue ssi  - Pharyngeal dysphagia, continue level 3 diet      Complex case      DVT Prophylaxis: Heparin gtt/coumadin      CODE STATUS: Full Code      Disposition: anticipate d/c in next few days as INR gets therapeutic and no signs of bleeding to [facility],  following    Dahlia Angel MD  01/03/18  10:54 AM

## 2018-01-03 NOTE — PROGRESS NOTES
"Pharmacy Consult  -  Warfarin    Lois Brennan is a  77 y.o. female   Height - 157.5 cm (62\")  Weight - 65.3 kg (144 lb)    Consulting Provider: - Dr. Dahlia Angel (hospitalist)  Indication: - Pulmonary embolism  Goal INR: 2.5-3  Home Regimen: New start    Bridge Therapy: Yes, patient currently on therapeutic heparin    Drug-Drug Interactions with current regimen:  Currently receiving therapeutic heparin drip    Prednisone may increase risk of bleeding    Of note patient was taking amiodarone at home prior to admission (per Cards, discontinued on 12/29)    Warfarin Dosing During Admission:    Date  1/1 1/2 1/3         INR  1.07 1.03 1.31         Dose  2.5 mg 2.5 mg (2.5 mg)              Education Provided:  New warfarin patient, education not yet provided.    Labs:      Results from last 7 days  Lab Units 01/03/18  0620 01/02/18 2011 01/02/18  1226 01/02/18  0527 01/01/18  2211 01/01/18  1407 01/01/18  0730  12/31/17  0514   INR  1.31  --   --  1.03  --   --  1.07  --   --    APTT seconds 72.1* 55.5 62.1* 53.9 41.9* 42.7* 52.7  < > 45.9   HEMOGLOBIN g/dL  --   --   --   --   --   --   --   --  12.0   HEMATOCRIT %  --   --   --   --   --   --   --   --  36.1   PLATELETS 10*3/mm3  --   --   --   --   --   --   --   --  201   < > = values in this interval not displayed.      Results from last 7 days  Lab Units 12/31/17 2002 12/31/17  0514   SODIUM mmol/L  --  138   POTASSIUM mmol/L 4.7 3.4*   CHLORIDE mmol/L  --  104   CO2 mmol/L  --  27.0   BUN mg/dL  --  19   CREATININE mg/dL  --  1.10   CALCIUM mg/dL  --  8.9   GLUCOSE mg/dL  --  84       Assessment/Plan:   Patient is a new start on warfarin anticoagulation therapy, initiated on 1/01. INR today is subtherapeutic for the desired goal range (2.5-3), however increasing from the previous days INR. Will continue today with warfarin 2.5 mg. Patient has had intracranial hemorrhage with history of CVA with hemorrhagic conversion. Of note, patient is on heparin drip to " bridge until INR is therapeutic and 50% of dinner intake last night.    Pharmacy will continue to follow and make dose adjustments as needed based on daily INR.      Thank you,  Iman Winchester, PharmD Candidate 2018  1/3/2018  8:21 AM      Basilio Montano PharmD  Pharmacy Resident  1/3/2018  8:21 AM

## 2018-01-03 NOTE — PLAN OF CARE
Problem: Patient Care Overview (Adult)  Goal: Plan of Care Review  Outcome: Ongoing (interventions implemented as appropriate)   01/03/18 1029   Coping/Psychosocial Response Interventions   Plan Of Care Reviewed With patient   Outcome Evaluation   Outcome Summary/Follow up Plan pt more alert,adssist more with transfers.ambulat 40 ft today,chair behind.needs lots of verbal cues   Patient Care Overview   Progress improving       Problem: Inpatient Physical Therapy  Goal: Bed Mobility Goal LTG- PT  Outcome: Ongoing (interventions implemented as appropriate)   12/23/17 1356 12/30/17 1156 01/03/18 1029   Bed Mobility PT LTG   Bed Mobility PT LTG, Date Established --  12/30/17 --    Bed Mobility PT LTG, Time to Achieve --  2 wks --    Bed Mobility PT LTG, Activity Type --  supine to sit/sit to supine --    Bed Mobility PT LTG, [place] Level --  contact guard assist --    Bed Mobility PT LTG, Date Goal Reviewed 12/23/17 --  --    Bed Mobility PT LTG, Outcome --  --  goal ongoing     Goal: Transfer Training Goal 2 LTG- PT  Outcome: Ongoing (interventions implemented as appropriate)   12/23/17 1356 12/30/17 1156 01/03/18 1029   Transfer Training 2 PT LTG   Transfer Training PT 2 LTG, Date Established --  12/30/17 --    Transfer Training PT 2 LTG, Time to Achieve --  2 wks --    Transfer Training PT 2 LTG, Activity Type --  sit to stand/stand to sit --    Transfer Training PT 2 LTG, [place] Level --  minimum assist (75% patient effort) --    Transfer Training PT 2 LTG, Assist Device (appropriate AD) --  --    Transfer Training PT 2 LTG, Date Goal Reviewed 12/23/17 --  --    Transfer Training PT 2 LTG, Outcome --  --  goal ongoing     Goal: Gait Training Goal LTG- PT  Outcome: Ongoing (interventions implemented as appropriate)   12/23/17 1356 12/30/17 1156 01/03/18 1029   Gait Training PT LTG   Gait Training Goal PT LTG, Date Established --  12/30/17 --    Gait Training Goal PT LTG, Time to Achieve --  2 wks --     Gait Training Goal PT LTG, [place] Level --  moderate assist (50% patient effort) --    Gait Training Goal PT LTG, Assist Device (appropriated AD) --  --    Gait Training Goal PT LTG, Distance to Achieve --  50 --    Gait Training Goal PT LTG, Date Goal Reviewed 12/23/17 --  --    Gait Training Goal PT LTG, Outcome --  --  goal partially met

## 2018-01-03 NOTE — PROGRESS NOTES
Adult Nutrition  Assessment/PES    Patient Name:  Lois Brennan  YOB: 1940  MRN: [id number]  Admit Date:  12/22/2017    Assessment Date:  1/3/2018    Comments:  RD PO follow up. PO intake remains poor per RN documentation, pt provides conflicting information. RD to continue to provide nutrition supplements to encourage po intake of kcals and protein. RD to continue to follow.     Additional Recommendations:  1. 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    Essential hypertension    Type 2 diabetes mellitus with complication, without long-term current use of insulin    Pharyngeal dysphagia    GERD (gastroesophageal reflux disease)    Dementia    Rheumatoid arthritis    Paroxysmal atrial fibrillation    Encephalopathy due to seizure    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 Pt also states she would like to continue pureed diet  due to ease of consumption r/t ease of mastication.       Per SLP 12/29  Pt participated in swallow treatment. Trialed thin liquid via cup/straw, puree, and cracker. Pt tolerated all consistencies w/o overt clinical s/sxs aspiration. W/ dentures placed, mastication was incr'd, but adequate. Pt appears safe for soft/ground ([facility] soft) diet and thin liquids; however, pt  declined diet upgrade at this time. Wishes to remain on pureed diet.      Rec: cont puree diet (per pt's preferences) - pt safe to upgrade to soft/ground diet or have mech soft items, as desired; thin liquids. No further needs identified warranting dysphagia tx.           Labs/Tests/Procedures/Meds       01/03/18 1054    Labs/Tests/Procedures/Meds    Labs/Tests Review Reviewed    Medication Review Reviewed, pertinent   ABX     Results from last 7 days  Lab Units 12/31/17 2002 12/31/17  0514   SODIUM mmol/L  --  138   POTASSIUM mmol/L 4.7 3.4*   CHLORIDE mmol/L  --  104   CO2 mmol/L  --  27.0   BUN mg/dL  --  19   CREATININE mg/dL  --  1.10   CALCIUM mg/dL  --  8.9   GLUCOSE mg/dL  --  84       Intake & Output (last day)       01/02 0701 - 01/03 0700 01/03 0701 - 01/04 0700    P.O. 360     Total Intake(mL/kg) 360 (5.5)     Net +360            Unmeasured Urine Occurrence 3 x                    Nutrition Prescription Ordered       01/03/18 1054    Nutrition Prescription PO    Current PO Diet Pureed    Dysphagia Level 2  Pureed    Fluid Consistency Thin    Supplement Boost Plus    Supplement Frequency 2 times a day            Evaluation of Received Nutrient/Fluid Intake       01/03/18 1055    PO Evaluation    Number of Meals 6    % PO Intake 38            Problem/Interventions:        Problem 1       01/03/18 1056    Nutrition Diagnoses Problem 1    Problem 1 Inadequate Intake/Infusion    Inadequate Intake Type Oral    Etiology (related to) --   ?Clinical condition, mental status affecting mental PO intake    Signs/Symptoms (evidenced by) PO Intake    Percent (%) intake recorded 38 %    Over number of meals 6                    Intervention Goal       01/03/18 1058    Intervention Goal    General Nutrition support treatment    PO Increase intake   Per recorded PO intake            Nutrition Intervention       01/03/18 1101    Nutrition Intervention    RD/Tech Action Follow Tx progress;Care plan reviewd;Encourage  intake;Interview for preference;Other (comment)   Will continue to provide nutrition supplements            Nutrition Prescription       01/03/18 1103    Other Orders    Supplement Vitamin mineral supplement    Supplement Ordered? No, recommended            Education/Evaluation       01/03/18 1102    Monitor/Evaluation    Monitor Per protocol;I&O;PO intake;Supplement intake        Electronically signed by:  Mary Garcia RDN, LD  01/03/18 11:04 AM

## 2018-01-03 NOTE — PLAN OF CARE
Problem: Patient Care Overview (Adult)  Goal: Plan of Care Review  Outcome: Ongoing (interventions implemented as appropriate)   01/03/18 0438   Coping/Psychosocial Response Interventions   Plan Of Care Reviewed With patient   Outcome Evaluation   Outcome Summary/Follow up Plan Pt with no acute distress overnight.   Patient Care Overview   Progress no change       Problem: Fall Risk (Adult)  Goal: Identify Related Risk Factors and Signs and Symptoms  Outcome: Ongoing (interventions implemented as appropriate)   12/28/17 0426   Fall Risk   Fall Risk: Related Risk Factors age-related changes;fatigue/slow reaction;gait/mobility problems;history of falls;neuro disease/injury   Fall Risk: Signs and Symptoms presence of risk factors     Goal: Absence of Falls  Outcome: Ongoing (interventions implemented as appropriate)   01/03/18 0438   Fall Risk (Adult)   Absence of Falls making progress toward outcome       Problem: Pressure Ulcer Risk (Kranthi Scale) (Adult,Obstetrics,Pediatric)  Goal: Identify Related Risk Factors and Signs and Symptoms  Outcome: Ongoing (interventions implemented as appropriate)   01/03/18 0438   Pressure Ulcer Risk (Kranthi Scale)   Related Risk Factors (Pressure Ulcer Risk (Kranthi Scale)) age extremes;mobility impaired;mental impairment     Goal: Skin Integrity  Outcome: Ongoing (interventions implemented as appropriate)   01/03/18 0438   Pressure Ulcer Risk (Kranthi Scale) (Adult,Obstetrics,Pediatric)   Skin Integrity making progress toward outcome       Problem: Diabetes, Type 2 (Adult)  Goal: Signs and Symptoms of Listed Potential Problems Will be Absent or Manageable (Diabetes, Type 2)  Outcome: Ongoing (interventions implemented as appropriate)   01/03/18 0438   Diabetes, Type 2   Problems Assessed (Type 2 Diabetes) all   Problems Present (Type 2 Diabetes) impaired glycemic control       Problem: Confusion, Acute (Adult)  Goal: Identify Related Risk Factors and Signs and Symptoms  Outcome:  Ongoing (interventions implemented as appropriate)   12/29/17 1000   Confusion, Acute   Related Risk Factors (Acute Confusion) advanced age;cognitive impairment   Signs and Symptoms (Acute Confusion) thought process diminished/disorganized     Goal: Cognitive/Functional Impairments Minimized  Outcome: Ongoing (interventions implemented as appropriate)   01/03/18 0438   Confusion, Acute (Adult)   Cognitive/Functional Impairments Minimized making progress toward outcome     Goal: Safety  Outcome: Ongoing (interventions implemented as appropriate)   01/03/18 0438   Confusion, Acute (Adult)   Safety making progress toward outcome       Problem: Skin Integrity Impairment, Risk/Actual (Adult)  Goal: Skin Integrity/Wound Healing  Outcome: Ongoing (interventions implemented as appropriate)   01/03/18 0438   Skin Integrity Impairment, Risk/Actual (Adult)   Skin Integrity/Wound Healing making progress toward outcome

## 2018-01-03 NOTE — THERAPY TREATMENT NOTE
Acute Care - Physical Therapy Treatment Note  [facility]     Patient Name: Lois Brennan  : 1940  MRN: [id number]  Today's Date: 1/3/2018  Onset of Illness/Injury or Date of Surgery Date: 17  Date of Referral to PT: 17  Referring Physician: SHYANN Eli    Admit Date: 2017    Visit Dx:    ICD-10-CM ICD-9-CM   1. Pharyngeal dysphagia R13.13 787.23   2. Left-sided weakness R53.1 728.87   3. Somnolence R40.0 780.09   4. 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 Paroxysmal atrial fibrillation I48.0 427.31   6. Diabetes mellitus type 2 in obese E11.69 250.00    E66.9 278.00   7. Hypertension, unspecified type I10 401.9   8. Impaired mobility and ADLs Z74.09 799.89   9. 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   • Other pulmonary embolism without acute cor pulmonale               Adult Rehabilitation Note       18 0915 18 1038 18 1033    Rehab Assessment/Intervention    Discipline physical therapy assistant  -UD occupational therapist  -AN physical therapy assistant  -UD    Document Type therapy note (daily note)  -UD therapy note (daily note)  -AN therapy note (daily note)  -UD    Subjective Information agree to therapy;complains of;weakness  -UD agree to therapy;complains of;pain  -AN agree to therapy;complains of;weakness;pain  -UD    Patient Effort, Rehab Treatment good  -UD adequate  -AN adequate   -UD    Symptoms Noted During/After Treatment fatigue  -UD increased pain  -AN increased pain  -UD    Precautions/Limitations fall precautions  -UD fall precautions;other (see comments)   exit alarma  -AN fall precautions  -UD    Specific Treatment Considerations lt side weakness  -UD l side neglect, labile emotionally  -AN     Recorded by [UD] Zina Enamorado, ANABELA [AN] Selina Dowell, OT [UD] Zina Enamorado, PTA    Vital Signs    O2 Delivery Post Treatment room air  -UD  room air  -UD    Pre Patient Position Supine  -UD  Sitting  -UD    Intra Patient Position Standing  -UD  Standing  -UD    Post Patient Position Sitting  -UD  Supine  -UD    Recorded by [UD] Zina Enamorado PTA  [UD] Zina Enamorado PTA    Pain Assessment    Pain Assessment No/denies pain  -UD Torres-Baker FACES  -AN Torres-Montano FACES  -UD    Torres-Montano FACES Pain Rating  4  -AN 4  -UD    Pain Score  3  -AN 4  -UD    Post Pain Score  2  -AN 6  -UD    Pain Type  Acute pain  -AN Acute pain  -UD    Pain Location  Hip  -AN Hip  -UD    Pain Orientation  Right  -AN Right  -UD    Pain Intervention(s)  Repositioned  -AN Repositioned  -UD    Response to Interventions  tolerated, improved  -AN     Recorded by [UD] Zina Enamorado PTA [AN] Selina Dowell, OT [UD] Zina Enamorado, PTA    Vision Assessment/Intervention    Visual Impairment  inattention to the left  -AN     Recorded by  [AN] Selina Dowell, OT     Cognitive Assessment/Intervention    Orientation Status  oriented to;person  -AN oriented to;person  -UD    Follows Commands/Answers Questions  75% of the time;needs cueing;needs increased time  -AN 75% of the time  -UD    Personal Safety  moderate impairment  -AN     Personal Safety Interventions  fall prevention program maintained  -AN fall prevention program maintained  -UD    Recorded by  [AN] Selina Dowell, OT [UD] Zina Enamorado, PTA    Bed Mobility, Assessment/Treatment    Bed Mob, Supine to Sit, [place] minimum assist (75% patient effort)  -UD      Bed Mob, Sit  to Supine, [place]  maximum assist (25% patient effort);2 person assist required  -AN maximum assist (25% patient effort);2 person assist required  -UD    Recorded by [UD] Zina Enamorado PTA [STEPHANIE] Selina Dowell OT [UD] Zina Enamorado PTA    Transfer Assessment/Treatment    Transfers, Chair-Bed [place]  maximum assist (25% patient effort);2 person assist required  -AN     Transfers, Sit-Stand [place] minimum assist (75% patient effort);moderate assist (50% patient effort);2 person assist required  -UD maximum assist (25% patient effort);2 person assist required  -AN maximum assist (25% patient effort);2 person assist required  -UD    Transfers, Stand-Sit [place] minimum assist (75% patient effort);moderate assist (50% patient effort);2 person assist required  -UD maximum assist (25% patient effort);2 person assist required  -AN maximum assist (25% patient effort);2 person assist required  -UD    Transfers, Sit-Stand-Sit, Assist Device rolling walker  -UD      Recorded by [UD] Zina Enamorado PTA [STEPHANIE] Selina Dowell OT [UD] Zina Enamorado PTA    Gait Assessment/Treatment    Gait, [place] Level moderate assist (50% patient effort);2 person assist required;1 person + 1 person to manage equipment  -UD  other (see comments);maximum assist (25% patient effort);2 person assist required   just 2-3 steps back to bed  -UD    Gait, Assistive Device rolling walker  -UD      Gait, Distance (Feet) 40  -UD      Gait, Gait Deviations step length decreased;bhupinder decreased;leans to the left  -UD      Gait, Safety Issues step length decreased;sequencing ability decreased;balance decreased during turns  -UD      Gait, Impairments strength decreased  -UD      Gait, Comment --   needs help keeping lt hand on walker,support filemon stay straig  -UD      Recorded by [UD] Zina Enamorado PTA  [UD] Zina Enamorado PTA    Balance Skills Training    Sitting-Level of Assistance Minimum assistance  -UD Minimum assistance  -AN      Sitting-Balance Support  Feet supported  -AN     Sitting-Balance Activities  Trunk control activities  -AN     Sitting # of Minutes  3  -AN     Standing-Level of Assistance Moderate assistance;x2  -UD Moderate assistance;x2  -AN     Recorded by [UD] Zina Enamorado PTA [AN] Selina Dowell OT     Therapy Exercises    Bilateral Lower Extremities AROM:;10 reps;sitting  -UD  AROM:;10 reps;sitting  -UD    Left Upper Extremity  AAROM:;5 reps;10 reps;sitting;elbow flexion/extension;hand pumps;shoulder horizontal abd/add  -AN     Recorded by [UD] Zina Enamorado PTA [AN] Selina Dowell OT [UD] Zina Enamorado PTA    Positioning and Restraints    Pre-Treatment Position in bed  -UD sitting in chair/recliner  -AN sitting in chair/recliner  -UD    Post Treatment Position chair  -UD bed  -AN bed  -UD    In Bed  supine;call light within reach;encouraged to call for assist;exit alarm on  -AN notified nsg;supine;call light within reach;exit alarm on;side rails up x2  -UD    In Chair notified nsg;reclined;sitting;call light within reach;exit alarm on;legs elevated;waffle cushion  -UD      Recorded by [UD] Zina Enamorado PTA [AN] Selina Dowell, OT [UD] Zina Enamorado PTA      User Key  (r) = Recorded By, (t) = Taken By, (c) = Cosigned By    Initials Name Effective Dates    UD Zina Enamorado PTA 06/22/15 -     STEPHANIE Dowell OT 06/22/15 -                 IP PT Goals       01/03/18 1029 01/02/18 1134 12/30/17 1156    Bed Mobility PT LTG    Bed Mobility PT LTG, Date Established   12/30/17  -MISSY    Bed Mobility PT LTG, Time to Achieve   2 wks  -MISSY    Bed Mobility PT LTG, Activity Type   supine to sit/sit to supine  -MISSY    Bed Mobility PT LTG, [place] Level   contact guard assist  -MISSY    Bed Mobility PT LTG, Outcome goal ongoing  -UD goal ongoing  -UD goal revised  -MISSY    Transfer Training 2 PT LTG    Transfer Training PT 2 LTG, Date Established   12/30/17  -MISSY    Transfer Training PT 2 LTG, Time to Achieve   2 wks  -MISSY    Transfer Training  PT 2 LTG, Activity Type   sit to stand/stand to sit  -MISSY    Transfer Training PT 2 LTG, [place] Level   minimum assist (75% patient effort)  -MISSY    Transfer Training PT 2 LTG, Outcome goal ongoing  -UD goal ongoing  -UD goal revised  -MISSY    Gait Training PT LTG    Gait Training Goal PT LTG, Date Established   12/30/17  -MISSY    Gait Training Goal PT LTG, Time to Achieve   2 wks  -MISSY    Gait Training Goal PT LTG, [place] Level   moderate assist (50% patient effort)  -MISSY    Gait Training Goal PT LTG, Distance to Achieve   50  -MISSY    Gait Training Goal PT LTG, Outcome goal partially met  -UD goal ongoing  -UD goal revised  -MISSY      12/28/17 1227 12/26/17 1531 12/23/17 1356    Bed Mobility PT LTG    Bed Mobility PT LTG, Date Established   12/23/17  -MJ    Bed Mobility PT LTG, Time to Achieve   5 days  -MJ    Bed Mobility PT LTG, Activity Type   supine to sit/sit to supine  -MJ    Bed Mobility PT LTG, [place] Level   moderate assist (50% patient effort);2 person assist required  -MJ    Bed Mobility PT LTG, Date Goal Reviewed   12/23/17  -MJ    Bed Mobility PT LTG, Outcome goal met  -UD goal ongoing  -SC goal ongoing  -MJ    Transfer Training PT LTG    Transfer Training PT LTG, Date Established   12/23/17  -MJ    Transfer Training PT LTG, Time to Achieve   1 wk  -MJ    Transfer Training PT LTG, Activity Type   sit to stand/stand to sit  -MJ    Transfer Training PT LTG, [place] Level   moderate assist (50% patient effort);2 person assist required  -MJ    Transfer Training PT LTG, Assist Device   --   appropriate AD  -MJ    Transfer Training PT  LTG, Date Goal Reviewed   12/23/17  -MJ    Transfer Training PT LTG, Outcome  goal met  -SC goal ongoing  -MJ    Transfer Training 2 PT LTG    Transfer Training PT 2 LTG, Date Established   12/23/17  -MJ    Transfer Training PT 2 LTG, Time to Achieve   1 wk  -MJ    Transfer Training PT 2 LTG, Activity Type   bed to chair /chair to bed  -MJ    Transfer Training PT 2  LTG, [place] Level   moderate assist (50% patient effort);2 person assist required  -MJ    Transfer Training PT 2 LTG, Assist Device   --   appropriate AD  -MJ    Transfer Training PT 2 LTG, Date Goal Reviewed   12/23/17  -MJ    Transfer Training PT 2 LTG, Outcome goal met  -UD  goal ongoing  -MJ    Gait Training PT LTG    Gait Training Goal PT LTG, Date Established   12/23/17  -MJ    Gait Training Goal PT LTG, Time to Achieve   1 wk  -MJ    Gait Training Goal PT LTG, [place] Level   maximum assist (25% patient effort);2 person assist required  -MJ    Gait Training Goal PT LTG, Assist Device   --   appropriated AD  -MJ    Gait Training Goal PT LTG, Distance to Achieve   10 feet  -MJ    Gait Training Goal PT LTG, Date Goal Reviewed   12/23/17  -MJ    Gait Training Goal PT LTG, Outcome goal ongoing  -UD  goal ongoing  -MJ      User Key  (r) = Recorded By, (t) = Taken By, (c) = Cosigned By    Initials Name Provider Type    SC Mikey Marsh, PT Physical Therapist    MISSY Rosa, PT Physical Therapist    UD Zina Enamorado, PTA Physical Therapy Assistant    SABI Gibson, PT Physical Therapist          Physical Therapy Education     Title: PT OT SLP Therapies (Active)     Topic: Physical Therapy (Active)     Point: Mobility training (Active)    Learning Progress Summary    Learner Readiness Method Response Comment Documented by Status   Patient Acceptance E,D ASHOK KABA   01/03/18 1029 Done    Acceptance E,D NR  UD 01/02/18 1133 Active    Acceptance E NR  MISSY 12/30/17 1156 Active    Acceptance E NR  KS 12/29/17 1000 Active    Acceptance E,D ASHOK KABA  UD 12/28/17 1227 Done    Eager E SENDY,ROSETTE PULIDO reviewed benefits of activity SC 12/26/17 1531 Done    Acceptance E VU  EM 12/26/17 0423 Done    Acceptance E,D NR   12/23/17 1355 Active   Family Acceptance E NR  KS 12/29/17 1000 Active    Acceptance E,D ASHOK DINERO   12/23/17 1355 Done               Point: Home exercise program (Active)    Learning Progress Summary     Learner Readiness Method Response Comment Documented by Status   Patient Acceptance E,D DU,NR   01/03/18 1029 Done    Acceptance E,D NR   01/02/18 1133 Active    Acceptance E NR   12/30/17 1156 Active    Acceptance E NR  KS 12/29/17 1000 Active    Acceptance E,D DU,NR   12/28/17 1227 Done    Eager E DU,NR,VU reviewed benefits of activity SC 12/26/17 1531 Done    Acceptance E VU  EM 12/26/17 0423 Done   Family Acceptance E NR  KS 12/29/17 1000 Active               Point: Body mechanics (Active)    Learning Progress Summary    Learner Readiness Method Response Comment Documented by Status   Patient Acceptance E,D DU,NR   01/03/18 1029 Done    Acceptance E,D NR   01/02/18 1133 Active    Acceptance E NR   12/30/17 1156 Active    Acceptance E NR  KS 12/29/17 1000 Active    Acceptance E,D DU,NR   12/28/17 1227 Done    Eager E DU,NR,VU reviewed benefits of activity SC 12/26/17 1531 Done    Acceptance E VU   12/26/17 0423 Done    Acceptance E,D NR   12/23/17 1355 Active   Family Acceptance E NR  KS 12/29/17 1000 Active    Acceptance E,D VU,NR   12/23/17 1355 Done               Point: Precautions (Active)    Learning Progress Summary    Learner Readiness Method Response Comment Documented by Status   Patient Acceptance E,D DU,NR   01/03/18 1029 Done    Acceptance E,D NR   01/02/18 1133 Active    Acceptance E NR   12/30/17 1156 Active    Acceptance E NR  KS 12/29/17 1000 Active    Acceptance E,D DU,NR   12/28/17 1227 Done    Eager E DU,NR,VU reviewed benefits of activity SC 12/26/17 1531 Done    Acceptance E VU  EM 12/26/17 0423 Done    Acceptance E,D NR   12/23/17 1355 Active   Family Acceptance E NR  KS 12/29/17 1000 Active    Acceptance E,D VU,NR   12/23/17 1355 Done                      User Key     Initials Effective Dates Name Provider Type Discipline    SC 06/19/15 -  Mikey Marsh, PT Physical Therapist PT     06/19/15 -  Adwoa Rosa, PT Physical Therapist PT     06/22/15 -   Zina Enamorado, PTA Physical Therapy Assistant PT    MJ 03/14/16 -  Juan C Gibson, PT Physical Therapist PT    KS 09/18/16 -  Brina Kurtz, RN Registered Nurse Nurse    EM 06/12/17 -  Halima Tavarez, RN Registered Nurse Nurse                    PT Recommendation and Plan  Anticipated Discharge Disposition: inpatient rehabilitation facility  Planned Therapy Interventions: balance training, bed mobility training, gait training, home exercise program, patient/family education, strengthening, transfer training  PT Frequency: daily  Plan of Care Review  Plan Of Care Reviewed With: patient  Progress: improving  Outcome Summary/Follow up Plan: pt more alert,adssist more with transfers.ambulat 40 ft today,chair behind.needs lots of verbal cues          Outcome Measures       01/03/18 0915 01/02/18 1038 01/02/18 1033    How much help from another person do you currently need...    Turning from your back to your side while in flat bed without using bedrails? 3  -UD  2  -UD    Moving from lying on back to sitting on the side of a flat bed without bedrails? 3  -UD  2  -UD    Moving to and from a bed to a chair (including a wheelchair)? 2  -UD  2  -UD    Standing up from a chair using your arms (e.g., wheelchair, bedside chair)? 2  -UD  2  -UD    Climbing 3-5 steps with a railing? 1  -UD  1  -UD    To walk in hospital room? 2  -UD  2  -UD    AM-PAC 6 Clicks Score 13  -UD  11  -UD    How much help from another is currently needed...    Putting on and taking off regular lower body clothing?  1  -AN     Bathing (including washing, rinsing, and drying)  2  -AN     Toileting (which includes using toilet bed pan or urinal)  1  -AN     Putting on and taking off regular upper body clothing  1  -AN     Taking care of personal grooming (such as brushing teeth)  2  -AN     Eating meals  2  -AN     Score  9  -AN     Modified [place] Scale    Modified [place] Scale  4 - Moderately severe disability.  Unable to walk without assistance, and unable  to attend to own bodily needs without assistance.  -AN       User Key  (r) = Recorded By, (t) = Taken By, (c) = Cosigned By    Initials Name Provider Type    MELODY Enamorado PTA Physical Therapy Assistant    STEPHANIE Dowell, OT Occupational Therapist           Time Calculation:         PT Charges       01/03/18 1031          Time Calculation    PT Received On 01/03/18  -UD      PT Goal Re-Cert Due Date 01/02/18  -UD      Time Calculation- PT    Total Timed Code Minutes- PT 24 minute(s)  -UD        User Key  (r) = Recorded By, (t) = Taken By, (c) = Cosigned By    Initials Name Provider Type    MELODY Enamorado PTA Physical Therapy Assistant          Therapy Charges for Today     Code Description Service Date Service Provider Modifiers Qty    54871215627 HC PT THER PROC EA 15 MIN 1/2/2018 Zina Enamorado, PTA GP 1    69922313970 HC PT THER PROC EA 15 MIN 1/3/2018 Zina Enamorado, PTA GP 1    66233766908 HC GAIT TRAINING EA 15 MIN 1/3/2018 Zina Enamorado, PTA GP 1    71986925247 HC PT THER SUPP EA 15 MIN 1/3/2018 Zina Enamorado, PTA GP 1          PT G-Codes  Outcome Measure Options: AM-PAC 6 Clicks Basic Mobility (PT)    Zina Enamorado PTA  1/3/2018

## 2018-01-04 LAB
APTT PPP: 52.7 SECONDS (ref 45–60)
APTT PPP: 59.6 SECONDS (ref 45–60)
APTT PPP: 71.3 SECONDS (ref 45–60)
GLUCOSE BLDC GLUCOMTR-MCNC: 109 MG/DL (ref 70–130)
GLUCOSE BLDC GLUCOMTR-MCNC: 124 MG/DL (ref 70–130)
GLUCOSE BLDC GLUCOMTR-MCNC: 212 MG/DL (ref 70–130)
GLUCOSE BLDC GLUCOMTR-MCNC: 229 MG/DL (ref 70–130)
INR PPP: 2
PROTHROMBIN TIME: 22.3 SECONDS (ref 9.6–11.5)

## 2018-01-04 PROCEDURE — 85730 THROMBOPLASTIN TIME PARTIAL: CPT

## 2018-01-04 PROCEDURE — 97110 THERAPEUTIC EXERCISES: CPT

## 2018-01-04 PROCEDURE — 63710000001 PREDNISONE PER 5 MG: Performed by: INTERNAL MEDICINE

## 2018-01-04 PROCEDURE — 82962 GLUCOSE BLOOD TEST: CPT

## 2018-01-04 PROCEDURE — 25010000002 HEPARIN (PORCINE) PER 1000 UNITS

## 2018-01-04 PROCEDURE — 97116 GAIT TRAINING THERAPY: CPT

## 2018-01-04 PROCEDURE — 99233 SBSQ HOSP IP/OBS HIGH 50: CPT | Performed by: INTERNAL MEDICINE

## 2018-01-04 PROCEDURE — 85610 PROTHROMBIN TIME: CPT | Performed by: INTERNAL MEDICINE

## 2018-01-04 RX ORDER — WARFARIN SODIUM 1 MG/1
1 TABLET ORAL
Status: DISCONTINUED | OUTPATIENT
Start: 2018-01-04 | End: 2018-01-05

## 2018-01-04 RX ADMIN — METOPROLOL TARTRATE 12.5 MG: 25 TABLET, FILM COATED ORAL at 20:24

## 2018-01-04 RX ADMIN — HEPARIN SODIUM 7 UNITS/KG/HR: 10000 INJECTION, SOLUTION INTRAVENOUS at 16:19

## 2018-01-04 RX ADMIN — INSULIN LISPRO 3 UNITS: 100 INJECTION, SOLUTION INTRAVENOUS; SUBCUTANEOUS at 12:17

## 2018-01-04 RX ADMIN — LORAZEPAM 0.5 MG: 0.5 TABLET ORAL at 09:08

## 2018-01-04 RX ADMIN — ATORVASTATIN CALCIUM 10 MG: 10 TABLET, FILM COATED ORAL at 20:23

## 2018-01-04 RX ADMIN — LEVETIRACETAM 500 MG: 500 TABLET, FILM COATED ORAL at 20:23

## 2018-01-04 RX ADMIN — INSULIN LISPRO 3 UNITS: 100 INJECTION, SOLUTION INTRAVENOUS; SUBCUTANEOUS at 18:48

## 2018-01-04 RX ADMIN — PANTOPRAZOLE SODIUM 40 MG: 40 TABLET, DELAYED RELEASE ORAL at 05:41

## 2018-01-04 RX ADMIN — AMLODIPINE BESYLATE 5 MG: 5 TABLET ORAL at 09:08

## 2018-01-04 RX ADMIN — LORAZEPAM 0.5 MG: 0.5 TABLET ORAL at 16:31

## 2018-01-04 RX ADMIN — PREDNISONE 7.5 MG: 5 TABLET ORAL at 09:13

## 2018-01-04 RX ADMIN — WARFARIN SODIUM 1 MG: 1 TABLET ORAL at 18:48

## 2018-01-04 RX ADMIN — LEVETIRACETAM 500 MG: 500 TABLET, FILM COATED ORAL at 09:08

## 2018-01-04 RX ADMIN — ACETAMINOPHEN 650 MG: 325 TABLET, FILM COATED ORAL at 16:31

## 2018-01-04 RX ADMIN — DONEPEZIL HYDROCHLORIDE 10 MG: 10 TABLET, FILM COATED ORAL at 20:23

## 2018-01-04 RX ADMIN — METOPROLOL TARTRATE 12.5 MG: 25 TABLET, FILM COATED ORAL at 09:08

## 2018-01-04 NOTE — PROGRESS NOTES
"Pharmacy Consult  -  Warfarin    Lois Brennan is a  77 y.o. female   Height - 157.5 cm (62\")  Weight - 63.7 kg (140 lb 6.4 oz)    Consulting Provider: - Dr. Dahlia Angel (hospitalist)  Indication: - Pulmonary embolism  Goal INR: 2.5-3  Home Regimen: New start    Bridge Therapy: Yes, patient currently on therapeutic heparin    Drug-Drug Interactions with current regimen:  Currently receiving therapeutic heparin drip    Prednisone may increase risk of bleeding    Of note patient was taking amiodarone at home prior to admission (per Cards, discontinued on 12/29)    Warfarin Dosing During Admission:    Date  1/1 1/2 1/3 1/4        INR  1.07 1.03 1.31 2.0        Dose  2.5 mg 2.5 mg 2.5 mg 1mg             Education Provided:  Warfarin education provided verbally and in writing.  Understanding verified with patient teach back.      Labs:    Results from last 7 days   Lab Units 01/04/18  0400 01/03/18  2257 01/03/18  1324 01/03/18  0620 01/02/18 2011 01/02/18  1226 01/02/18  0527  01/01/18  0730  12/31/17  0514   INR  2.00 --  --  1.31 --  --  1.03 --  1.07 --  --    APTT seconds 59.6 57.2 45.9 72.1* 55.5 62.1* 53.9 < > 52.7 < > 45.9   HEMOGLOBIN g/dL --  --  --  --  --  --  --  --  --  --  12.0   HEMATOCRIT % --  --  --  --  --  --  --  --  --  --  36.1   PLATELETS 10*3/mm3 --  --  --  --  --  --  --  --  --  --  201   < > = values in this interval not displayed.     Results from last 7 days   Lab Units 12/31/17 2002 12/31/17 0514   SODIUM mmol/L --  138   POTASSIUM mmol/L 4.7 3.4*   CHLORIDE mmol/L --  104   CO2 mmol/L --  27.0   BUN mg/dL --  19   CREATININE mg/dL --  1.10   CALCIUM mg/dL --  8.9   GLUCOSE mg/dL --  84     Dietary Intake: Dysphagia pureed diet--\"took a few bites\" of today's meal    Assessment/Plan:   New start on warfarin therapy initiated 1/1/17. INR SUBtherapeutic today at 2.0, with goal 2.5-3.0. Given significant increase in INR overnight and diminished oral intake will decrease to warfarin 1mg " tonight. Pharmacy will continue to follow and adjust dosing based on INR values and clinical picture.    Basilio Montano, ShilaD  Pharmacy Resident  1/4/2018  7:20 AM

## 2018-01-04 NOTE — THERAPY TREATMENT NOTE
Acute Care - Physical Therapy Treatment Note  [facility]     Patient Name: Lois Brennan  : 1940  MRN: [id number]  Today's Date: 2018  Onset of Illness/Injury or Date of Surgery Date: 17  Date of Referral to PT: 17  Referring Physician: SHYANN Eli    Admit Date: 2017    Visit Dx:    ICD-10-CM ICD-9-CM   1. Pharyngeal dysphagia R13.13 787.23   2. Left-sided weakness R53.1 728.87   3. Somnolence R40.0 780.09   4. 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 Paroxysmal atrial fibrillation I48.0 427.31   6. Diabetes mellitus type 2 in obese E11.69 250.00    E66.9 278.00   7. Hypertension, unspecified type I10 401.9   8. Impaired mobility and ADLs Z74.09 799.89   9. 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  -EH agree to therapy;complains of;weakness  -UD agree to therapy;complains of;pain  -AN    Patient Effort, Rehab  Treatment good  -EH good  -UD adequate  -AN    Symptoms Noted During/After Treatment fatigue  - fatigue  -UD increased pain  -AN    Symptoms Noted Comment pt reports being cold.  -      Precautions/Limitations  fall precautions  -UD fall precautions;other (see comments)   exit alarma  -AN    Specific Treatment Considerations  lt side weakness  -UD l side neglect, labile emotionally  -AN    Recorded by [] Kendal Whitfield, PT [UD] Zina Enamorado, ANABELA [AN] Selina Dowell, OT    Vital Signs    O2 Delivery Post Treatment  room air  -UD     Pre Patient Position  Supine  -UD     Intra Patient Position  Standing  -UD     Post Patient Position  Sitting  -UD     Recorded by  [UD] Zina Enamorado PTA     Pain Assessment    Pain Assessment No/denies pain  - No/denies pain  -UD Torres-Baker FACES  -AN    Torres-Montano FACES Pain Rating   4  -AN    Pain Score   3  -AN    Post Pain Score   2  -AN    Pain Type Acute pain  -  Acute pain  -AN    Pain Location Hip  -  Hip  -AN    Pain Orientation Right  -  Right  -AN    Pain Intervention(s) Repositioned;Ambulation/increased activity  -  Repositioned  -AN    Response to Interventions tolerated  -  tolerated, improved  -AN    Recorded by [EH] Kendal Whitfield, PT [UD] Zina Enamorado, ANABELA [AN] Selina Dowell, OT    Vision Assessment/Intervention    Visual Impairment   inattention to the left  -AN    Recorded by   [AN] Selina Dowell, PARISH    Cognitive Assessment/Intervention    Current Cognitive/Communication Assessment impaired  -      Orientation Status oriented to;person;situation  -  oriented to;person  -AN    Follows Commands/Answers Questions able to follow single-step instructions;needs cueing;needs increased time;needs repetition;100% of the time  -  75% of the time;needs cueing;needs increased time  -AN    Personal Safety moderate impairment;decreased awareness, need for assist;decreased awareness, need for safety  -  moderate impairment  -AN    Personal Safety  Interventions fall prevention program maintained;gait belt  -EH  fall prevention program maintained  -AN    Recorded by [EH] Kendal Whitfield, PT  [AN] Selina Dowell, OT    Bed Mobility, Assessment/Treatment    Bed Mobility, Roll Right, [place] supervision required  -EH      Bed Mob, Supine to Sit, [place] minimum assist (75% patient effort);2 person assist required   with trunk movement to sitting;  -EH minimum assist (75% patient effort)  -UD     Bed Mob, Sit to Supine, [place]   maximum assist (25% patient effort);2 person assist required  -AN    Bed Mobility, Comment Pt moves BLEs in sidelying to EOB without assist, positions R arm underneath her to assist with movement of trunk.  -EH      Recorded by [EH] Kendal Whitfield, PT [UD] Zina Enamorado, ANABELA [AN] Selina Dowell, OT    Transfer Assessment/Treatment    Transfers, Chair-Bed [place]   maximum assist (25% patient effort);2 person assist required  -AN    Transfers, Sit-Stand [place] minimum assist (75% patient effort)  -EH minimum assist (75% patient effort);moderate assist (50% patient effort);2 person assist required  -UD maximum assist (25% patient effort);2 person assist required  -AN    Transfers, Stand-Sit [place] minimum assist (75% patient effort)  -EH minimum assist (75% patient effort);moderate assist (50% patient effort);2 person assist required  -UD maximum assist (25% patient effort);2 person assist required  -AN    Transfers, Sit-Stand-Sit, Assist Device rolling walker  -EH rolling walker  -UD     Transfer, Safety Issues step length decreased;weight-shifting ability decreased;balance decreased during turns;sequencing ability decreased  -      Transfer, Impairments strength decreased;impaired balance;coordination impaired  -      Transfer, Comment Pt with good forward weight shift and push with BLEs, needs assist for balance once standing.  -EH      Recorded by [EH] Kendal Wihtfield, PT [UD] Zina Enamorado, PTA  [AN] Selina Dowell OT    Gait Assessment/Treatment    Gait, [place] Level moderate assist (50% patient effort);2 person assist required  - moderate assist (50% patient effort);2 person assist required;1 person + 1 person to manage equipment  -     Gait, Assistive Device --   BUE support, gait belt.  - rolling walker  -     Gait, Distance (Feet) 20   sitting rest break between bouts of 10 ft  - 40  -     Gait, Gait Pattern Analysis swing-to gait  -      Gait, Gait Deviations step length decreased;bhupinder decreased;leans to the left;right:;knee buckling   pt contorls R knee most of time; shakes.  - step length decreased;bhupinder decreased;leans to the left  -     Gait, Safety Issues weight-shifting ability decreased  - step length decreased;sequencing ability decreased;balance decreased during turns  -     Gait, Impairments  strength decreased  -     Gait, Comment  --   needs help keeping lt hand on walker,support filemon stay straig  -UD     Recorded by [EH] Kendal Whitfield, PT [UD] Zina Enamorado \[facility]\""     Balance Skills Training    Training Strategies (Balance Skills) sitting balance is very challenging for pt and pt is unable to maintain with multiple LOB to either direction.   -      Sitting-Level of Assistance Minimum assistance  -EH Minimum assistance  -UD Minimum assistance  -AN    Sitting-Balance Support Feet supported  -EH  Feet supported  -AN    Sitting-Balance Activities Trunk control activities   orientation to midline;  -EH  Trunk control activities  -AN    Sitting # of Minutes 3  -EH  3  -AN    Standing-Level of Assistance Moderate assistance;x2  -EH Moderate assistance;x2  -UD Moderate assistance;x2  -AN    Static Standing Balance Support Right upper extremity supported;Left upper extremity supported  -      Standing-Balance Activities Weight Shift R-L;Weight Shift A-P  -EH      Standing Balance # of Minutes 1  -EH      Recorded by [EH] Kendal Whitfield, PT [UD] Zina  ANABELA Enamorado [AN] Selina Dowell, OT    Therapy Exercises    Bilateral Lower Extremities AROM:;ankle pumps/circles;knee flexion;LAQ  -EH AROM:;10 reps;sitting  -UD     BLE Other Reps 6  -EH      Left Upper Extremity   AAROM:;5 reps;10 reps;sitting;elbow flexion/extension;hand pumps;shoulder horizontal abd/add  -AN    Recorded by [] Kendal Whitfield, PT [UD] Zina Enamorado PTA [AN] Selina Dowell, OT    Positioning and Restraints    Pre-Treatment Position in bed  -EH in bed  -UD sitting in chair/recliner  -AN    Post Treatment Position chair  -EH chair  -UD bed  -AN    In Bed   supine;call light within reach;encouraged to call for assist;exit alarm on  -AN    In Chair reclined;call light within reach;encouraged to call for assist;exit alarm on;with family/caregiver;waffle cushion  - notified nsg;reclined;sitting;call light within reach;exit alarm on;legs elevated;waffle cushion  -UD     Recorded by [] Kendal Whitfield, PT [UD] Zina Enamorado PTA [AN] Selina Dowell, OT      01/02/18 1033          Rehab Assessment/Intervention    Discipline physical therapy assistant  -UD      Document Type therapy note (daily note)  -UD      Subjective Information agree to therapy;complains of;weakness;pain  -UD      Patient Effort, Rehab Treatment adequate  -UD      Symptoms Noted During/After Treatment increased pain  -UD      Precautions/Limitations fall precautions  -UD      Recorded by [UD] Zina Enamorado PTA      Vital Signs    O2 Delivery Post Treatment room air  -UD      Pre Patient Position Sitting  -UD      Intra Patient Position Standing  -UD      Post Patient Position Supine  -UD      Recorded by [UD] Zina Enamorado PTA      Pain Assessment    Pain Assessment Torres-Montano FACES  -UD      Torres-Montano FACES Pain Rating 4  -UD      Pain Score 4  -UD      Post Pain Score 6  -UD      Pain Type Acute pain  -UD      Pain Location Hip  -UD      Pain Orientation Right  -UD      Pain Intervention(s) Repositioned  -UD      Recorded by  [UD] Zina Enamorado PTA      Cognitive Assessment/Intervention    Orientation Status oriented to;person  -UD      Follows Commands/Answers Questions 75% of the time  -UD      Personal Safety Interventions fall prevention program maintained  -UD      Recorded by [UD] Zina Enamorado PTA      Bed Mobility, Assessment/Treatment    Bed Mob, Sit to Supine, [place] maximum assist (25% patient effort);2 person assist required  -UD      Recorded by [UD] Zina Enamorado PTA      Transfer Assessment/Treatment    Transfers, Sit-Stand [place] maximum assist (25% patient effort);2 person assist required  -UD      Transfers, Stand-Sit [place] maximum assist (25% patient effort);2 person assist required  -UD      Recorded by [UD] Zina Enamorado PTA      Gait Assessment/Treatment    Gait, [place] Level other (see comments);maximum assist (25% patient effort);2 person assist required   just 2-3 steps back to bed  -UD      Recorded by [UD] Zina Enamorado PTA      Therapy Exercises    Bilateral Lower Extremities AROM:;10 reps;sitting  -UD      Recorded by [UD] Zina Enamorado PTA      Positioning and Restraints    Pre-Treatment Position sitting in chair/recliner  -UD      Post Treatment Position bed  -UD      In Bed notified nsg;supine;call light within reach;exit alarm on;side rails up x2  -UD      Recorded by [UD] Zina Enamorado PTA        User Key  (r) = Recorded By, (t) = Taken By, (c) = Cosigned By    Initials Name Effective Dates    UD Zina Enamorado, PTA 06/22/15 -     EH Kendal Whitfield, PT 06/19/15 -     AN Selina Dowell, OT 06/22/15 -                 IP PT Goals       01/04/18 1613 01/03/18 1029 01/02/18 1134    Bed Mobility PT LTG    Bed Mobility PT LTG, Outcome goal ongoing  -EH goal ongoing  -UD goal ongoing  -UD    Transfer Training 2 PT LTG    Transfer Training PT 2 LTG, Outcome goal ongoing  -EH goal ongoing  -UD goal ongoing  -UD    Gait Training PT LTG    Gait Training Goal PT LTG, Outcome goal ongoing   -EH goal partially met  -UD goal ongoing  -UD      12/30/17 1156 12/28/17 1227 12/26/17 1531    Bed Mobility PT LTG    Bed Mobility PT LTG, Date Established 12/30/17  -MISSY      Bed Mobility PT LTG, Time to Achieve 2 wks  -MISSY      Bed Mobility PT LTG, Activity Type supine to sit/sit to supine  -MISSY      Bed Mobility PT LTG, [place] Level contact guard assist  -MISSY      Bed Mobility PT LTG, Outcome goal revised  -MISSY goal met  -UD goal ongoing  -SC    Transfer Training PT LTG    Transfer Training PT LTG, Outcome   goal met  -SC    Transfer Training 2 PT LTG    Transfer Training PT 2 LTG, Date Established 12/30/17  -IMSSY      Transfer Training PT 2 LTG, Time to Achieve 2 wks  -MISSY      Transfer Training PT 2 LTG, Activity Type sit to stand/stand to sit  -MISSY      Transfer Training PT 2 LTG, [place] Level minimum assist (75% patient effort)  -MISSY      Transfer Training PT 2 LTG, Outcome goal revised  -MISSY goal met  -UD     Gait Training PT LTG    Gait Training Goal PT LTG, Date Established 12/30/17  -MISSY      Gait Training Goal PT LTG, Time to Achieve 2 wks  -MISSY      Gait Training Goal PT LTG, [place] Level moderate assist (50% patient effort)  -MISSY      Gait Training Goal PT LTG, Distance to Achieve 50  -MISSY      Gait Training Goal PT LTG, Outcome goal revised  -MISSY goal ongoing  -UD       12/23/17 1356          Bed Mobility PT LTG    Bed Mobility PT LTG, Date Established 12/23/17  -MJ      Bed Mobility PT LTG, Time to Achieve 5 days  -MJ      Bed Mobility PT LTG, Activity Type supine to sit/sit to supine  -MJ      Bed Mobility PT LTG, [place] Level moderate assist (50% patient effort);2 person assist required  -MJ      Bed Mobility PT LTG, Date Goal Reviewed 12/23/17  -MJ      Bed Mobility PT LTG, Outcome goal ongoing  -MJ      Transfer Training PT LTG    Transfer Training PT LTG, Date Established 12/23/17  -MJ      Transfer Training PT LTG, Time to Achieve 1 wk  -MJ      Transfer Training PT LTG, Activity Type  sit to stand/stand to sit  -MJ      Transfer Training PT LTG, [place] Level moderate assist (50% patient effort);2 person assist required  -MJ      Transfer Training PT LTG, Assist Device --   appropriate AD  -MJ      Transfer Training PT  LTG, Date Goal Reviewed 12/23/17  -MJ      Transfer Training PT LTG, Outcome goal ongoing  -MJ      Transfer Training 2 PT LTG    Transfer Training PT 2 LTG, Date Established 12/23/17  -MJ      Transfer Training PT 2 LTG, Time to Achieve 1 wk  -MJ      Transfer Training PT 2 LTG, Activity Type bed to chair /chair to bed  -MJ      Transfer Training PT 2 LTG, [place] Level moderate assist (50% patient effort);2 person assist required  -MJ      Transfer Training PT 2 LTG, Assist Device --   appropriate AD  -MJ      Transfer Training PT 2 LTG, Date Goal Reviewed 12/23/17  -MJ      Transfer Training PT 2 LTG, Outcome goal ongoing  -MJ      Gait Training PT LTG    Gait Training Goal PT LTG, Date Established 12/23/17  -MJ      Gait Training Goal PT LTG, Time to Achieve 1 wk  -MJ      Gait Training Goal PT LTG, [place] Level maximum assist (25% patient effort);2 person assist required  -MJ      Gait Training Goal PT LTG, Assist Device --   appropriated AD  -MJ      Gait Training Goal PT LTG, Distance to Achieve 10 feet  -MJ      Gait Training Goal PT LTG, Date Goal Reviewed 12/23/17  -MJ      Gait Training Goal PT LTG, Outcome goal ongoing  -MJ        User Key  (r) = Recorded By, (t) = Taken By, (c) = Cosigned By    Initials Name Provider Type    DACIA Marsh, PT Physical Therapist    MISSY Rosa, PT Physical Therapist    MELODY Enamorado, PTA Physical Therapy Assistant    JUSTUS Whitfield, PT Physical Therapist    SABI Gibson, PT Physical Therapist          Physical Therapy Education     Title: PT OT SLP Therapies (Active)     Topic: Physical Therapy (Active)     Point: Mobility training (Active)    Learning Progress Summary    Learner Readiness Method  Response Comment Documented by Status   Patient Acceptance E,D DU,NR   01/03/18 1029 Done    Acceptance E,D NR   01/02/18 1133 Active    Acceptance E NR   12/30/17 1156 Active    Acceptance E NR  KS 12/29/17 1000 Active    Acceptance E,D DU,NR   12/28/17 1227 Done    Eager E DU,NR,VU reviewed benefits of activity SC 12/26/17 1531 Done    Acceptance E VU  EM 12/26/17 0423 Done    Acceptance E,D NR   12/23/17 1355 Active   Family Acceptance E NR  KS 12/29/17 1000 Active    Acceptance E,D VU,NR   12/23/17 1355 Done               Point: Home exercise program (Active)    Learning Progress Summary    Learner Readiness Method Response Comment Documented by Status   Patient Acceptance E,D DU,NR   01/03/18 1029 Done    Acceptance E,D NR   01/02/18 1133 Active    Acceptance E NR   12/30/17 1156 Active    Acceptance E NR  KS 12/29/17 1000 Active    Acceptance E,D DU,NR   12/28/17 1227 Done    Eager E DU,NR,VU reviewed benefits of activity SC 12/26/17 1531 Done    Acceptance E VU  EM 12/26/17 0423 Done   Family Acceptance E NR  KS 12/29/17 1000 Active               Point: Body mechanics (Active)    Learning Progress Summary    Learner Readiness Method Response Comment Documented by Status   Patient Acceptance E,D DU,NR   01/03/18 1029 Done    Acceptance E,D NR   01/02/18 1133 Active    Acceptance E NR   12/30/17 1156 Active    Acceptance E NR  KS 12/29/17 1000 Active    Acceptance E,D DU,NR   12/28/17 1227 Done    Eager E DU,NR,VU reviewed benefits of activity SC 12/26/17 1531 Done    Acceptance E VU  EM 12/26/17 0423 Done    Acceptance E,D NR   12/23/17 1355 Active   Family Acceptance E NR  KS 12/29/17 1000 Active    Acceptance E,D VU,NR   12/23/17 1355 Done               Point: Precautions (Active)    Learning Progress Summary    Learner Readiness Method Response Comment Documented by Status   Patient Acceptance E,D DU,NR   01/03/18 1029 Done    Acceptance E,D NR   01/02/18 1133 Active     Acceptance E NR   12/30/17 1156 Active    Acceptance E NR  KS 12/29/17 1000 Active    Acceptance E,D DU,NR  UD 12/28/17 1227 Done    Eager E DU,ASHOK,ROSETTE reviewed benefits of activity SC 12/26/17 1531 Done    Acceptance E VU   12/26/17 0423 Done    Acceptance E,D NR   12/23/17 1355 Active   Family Acceptance E NR  KS 12/29/17 1000 Active    Acceptance E,D VU,NR   12/23/17 1355 Done                      User Key     Initials Effective Dates Name Provider Type Discipline    SC 06/19/15 -  Mikey Marsh, PT Physical Therapist PT    MISSY 06/19/15 -  Adwoa Rosa, PT Physical Therapist PT     06/22/15 -  Zina Enamorado PTA Physical Therapy Assistant PT     03/14/16 -  Juan C Gibson, PT Physical Therapist PT    KS 09/18/16 -  Brina Kurtz RN Registered Nurse Nurse     06/12/17 -  Halima Tavarez, RN Registered Nurse Nurse                    PT Recommendation and Plan  Anticipated Discharge Disposition: inpatient rehabilitation facility  Planned Therapy Interventions: balance training, bed mobility training, gait training, home exercise program, patient/family education, strengthening, transfer training  PT Frequency: daily  Plan of Care Review  Plan Of Care Reviewed With: patient  Progress: progress towards functional goals is fair  Outcome Summary/Follow up Plan: Pt ambulates 2 bouts of 10 ft with BUE support, gait belt and MOD Ax2 with chair following. Pt had need for assist with balance in sitting, standing and ambulation.           Outcome Measures       01/04/18 1526 01/03/18 0915 01/02/18 1038    How much help from another person do you currently need...    Turning from your back to your side while in flat bed without using bedrails? 3  -EH 3  -UD     Moving from lying on back to sitting on the side of a flat bed without bedrails? 3  -EH 3  -UD     Moving to and from a bed to a chair (including a wheelchair)? 2  -EH 2  -UD     Standing up from a chair using your arms (e.g., wheelchair, bedside chair)? 2   - 2  -UD     Climbing 3-5 steps with a railing? 1  - 1  -UD     To walk in hospital room? 2  - 2  -UD     -PAC 6 Clicks Score 13  - 13  -UD     How much help from another is currently needed...    Putting on and taking off regular lower body clothing?   1  -AN    Bathing (including washing, rinsing, and drying)   2  -AN    Toileting (which includes using toilet bed pan or urinal)   1  -AN    Putting on and taking off regular upper body clothing   1  -AN    Taking care of personal grooming (such as brushing teeth)   2  -AN    Eating meals   2  -AN    Score   9  -AN    Modified Stanton Scale    Modified Stanton Scale   4 - Moderately severe disability.  Unable to walk without assistance, and unable to attend to own bodily needs without assistance.  -AN    Functional Assessment    Outcome Measure Options [facility] 6 Clicks Basic Mobility (PT)  -        01/02/18 1033          How much help from another person do you currently need...    Turning from your back to your side while in flat bed without using bedrails? 2  -UD      Moving from lying on back to sitting on the side of a flat bed without bedrails? 2  -UD      Moving to and from a bed to a chair (including a wheelchair)? 2  -UD      Standing up from a chair using your arms (e.g., wheelchair, bedside chair)? 2  -UD      Climbing 3-5 steps with a railing? 1  -UD      To walk in hospital room? 2  -UD      AM-PAC 6 Clicks Score 11  -UD        User Key  (r) = Recorded By, (t) = Taken By, (c) = Cosigned By    Initials Name Provider Type    MELODY Enamorado, PTA Physical Therapy Assistant     Kendal Whitfield, PT Physical Therapist    STEPHANIE Dowell, OT Occupational Therapist           Time Calculation:         PT Charges       01/04/18 1612          Time Calculation    Start Time 1526  -      PT Received On 01/04/18  [facility]      PT Goal Re-Cert Due Date 01/14/18  -      Time Calculation- PT    Total Timed Code Minutes- PT 23 minute(s)  -        User Key   (r) = Recorded By, (t) = Taken By, (c) = Cosigned By    Initials Name Provider Type     Kendal Whitfield, PT Physical Therapist          Therapy Charges for Today     Code Description Service Date Service Provider Modifiers Qty    97438099480 HC GAIT TRAINING EA 15 MIN 1/4/2018 Kendal Whitfield, PT GP 1    25046485672 HC PT THER PROC EA 15 MIN 1/4/2018 Kendal Whitfield, PT GP 1    40940255310 HC PT THER SUPP EA 15 MIN 1/4/2018 Kendal Whitfield, PT GP 2          PT G-Codes  Outcome Measure Options: AM-PAC 6 Clicks Basic Mobility (PT)    Kendal Whitfield, PT  1/4/2018

## 2018-01-04 NOTE — PLAN OF CARE
Problem: Patient Care Overview (Adult)  Goal: Plan of Care Review  Outcome: Ongoing (interventions implemented as appropriate)   01/04/18 1613   Coping/Psychosocial Response Interventions   Plan Of Care Reviewed With patient   Outcome Evaluation   Outcome Summary/Follow up Plan Pt ambulates 2 bouts of 10 ft with BUE support, gait belt and MOD Ax2 with chair following. Pt had need for assist with balance in sitting, standing and ambulation.    Patient Care Overview   Progress progress towards functional goals is fair       Problem: Inpatient Physical Therapy  Goal: Bed Mobility Goal LTG- PT  Outcome: Ongoing (interventions implemented as appropriate)   12/23/17 1356 12/30/17 1156 01/04/18 1613   Bed Mobility PT LTG   Bed Mobility PT LTG, Date Established --  12/30/17 --    Bed Mobility PT LTG, Time to Achieve --  2 wks --    Bed Mobility PT LTG, Activity Type --  supine to sit/sit to supine --    Bed Mobility PT LTG, [place] Level --  contact guard assist --    Bed Mobility PT LTG, Date Goal Reviewed 12/23/17 --  --    Bed Mobility PT LTG, Outcome --  --  goal ongoing     Goal: Transfer Training Goal 2 LTG- PT  Outcome: Ongoing (interventions implemented as appropriate)   12/23/17 1356 12/30/17 1156 01/04/18 1613   Transfer Training 2 PT LTG   Transfer Training PT 2 LTG, Date Established --  12/30/17 --    Transfer Training PT 2 LTG, Time to Achieve --  2 wks --    Transfer Training PT 2 LTG, Activity Type --  sit to stand/stand to sit --    Transfer Training PT 2 LTG, [place] Level --  minimum assist (75% patient effort) --    Transfer Training PT 2 LTG, Assist Device (appropriate AD) --  --    Transfer Training PT 2 LTG, Date Goal Reviewed 12/23/17 --  --    Transfer Training PT 2 LTG, Outcome --  --  goal ongoing     Goal: Gait Training Goal LTG- PT  Outcome: Ongoing (interventions implemented as appropriate)   12/23/17 1356 12/30/17 1156 01/04/18 1613   Gait Training PT LTG   Gait Training Goal PT LTG,  Date Established --  12/30/17 --    Gait Training Goal PT LTG, Time to Achieve --  2 wks --    Gait Training Goal PT LTG, [place] Level --  moderate assist (50% patient effort) --    Gait Training Goal PT LTG, Assist Device (appropriated AD) --  --    Gait Training Goal PT LTG, Distance to Achieve --  50 --    Gait Training Goal PT LTG, Date Goal Reviewed 12/23/17 --  --    Gait Training Goal PT LTG, Outcome --  --  goal ongoing

## 2018-01-04 NOTE — PROGRESS NOTES
[facility] Medicine Services  PROGRESS NOTE    Patient Name: Lois Brennan  : 1940  MRN: [id number]    Date of Admission: 2017  Length of Stay: 13  Primary Care Physician: No Known Provider    Subjective   Subjective     CC:f/u for PE and intracranial bleed    HPI: No acute events overnight, patient was anxious and tearful yesterday evening, did complain of some indigestion, this morning she states that she is feeling better, did get some rest    Review of Systems  Gen- No fevers, chills  CV- No chest pain, palpitations  Resp- No cough, dyspnea  GI- No N/V/D, abd pain    Otherwise ROS is negative except as mentioned in the HPI.    Objective   Objective     Vital Signs:   Temp:  [98 °F (36.7 °C)-99.1 °F (37.3 °C)] 99.1 °F (37.3 °C)  Heart Rate:  [77-79] 79  Resp:  [16] 16  BP: (130-138)/(64-76) 130/64  Total (NIH Stroke Scale): 6     Physical Exam:  Constitutional: No acute distress, awake, alert, laying in bed  HENT: NCAT, mucous membranes moist  Respiratory: Clear to auscultation bilaterally, respiratory effort normal   Cardiovascular: RRR, no murmurs, rubs, or gallops, palpable pedal pulses bilaterally  Gastrointestinal: Positive bowel sounds, soft, nontender, nondistended  Musculoskeletal: No bilateral ankle edema  Psychiatric: Appropriate affect, cooperative  Neurologic: Oriented x 3, LUE weakness, left facial droop  Skin: No rashes    Results Reviewed:  I have personally reviewed current lab, radiology, and data and agree.      Results from last 7 days  Lab Units 18  0400 18  0620 18  0517  0514   WBC 10*3/mm3  --   --   --   --  7.77   HEMOGLOBIN g/dL  --   --   --   --  12.0   HEMATOCRIT %  --   --   --   --  36.1   PLATELETS 10*3/mm3  --   --   --   --  201   INR  2.00 1.31 1.03  < >  --    < > = values in this interval not displayed.    Results from last 7 days  Lab Units 17  0517  1549   SODIUM mmol/L  --   138  --    POTASSIUM mmol/L 4.7 3.4*  --    CHLORIDE mmol/L  --  104  --    CO2 mmol/L  --  27.0  --    BUN mg/dL  --  19  --    CREATININE mg/dL  --  1.10  --    GLUCOSE mg/dL  --  84  --    CALCIUM mg/dL  --  8.9  --    TROPONIN I ng/mL  --   --  0.031     No results found for: BNP  No results found for: PHART    Microbiology Results Abnormal     None          Imaging Results (last 24 hours)     ** No results found for the last 24 hours. **        Results for orders placed during the hospital encounter of 12/09/17   Adult Transthoracic Echo Complete W/ Cont if Necessary Per Protocol (With Agitated Saline)    Narrative · Left ventricular systolic function is normal. Estimated EF = 70%.  · Normal right ventricular cavity size, wall thickness, systolic function   and septal motion noted.  · Saline test results are negative.  · The aortic valve exhibits sclerosis.  · No evidence of pulmonary hypertension is present.  · There is no evidence of pericardial effusion.          I have reviewed the medications.    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 She had a preceding episode of transient facial weakness earlier that week.  CT scan revealed a 3.7 x 3.8 cm frontal hemorrhage with some right to left shift and a smaller posterior and superior hemorrhage.  She had atrial fibrillation during her echocardiogram.  She eventually was discharged to rehabilitation on 12/14/2017.      She was readmitted on 12/22/2017 after being transferred back from [facility] after developing headache, worsening mental status and worsening left-sided weakness.  There was concern for seizures.   She was doing well during the hospitalization and subsequently developed shortness of breath.  A CT angiogram done on 12/29/2017 showed an acute pulmonary embolism in the right main pulmonary artery and lower lobe pulmonary arteries.  She was started on a heparin drip and transferred to the intensive care unit. Neurosurgery was consulted and were agreeable. Patient was deemed stable and later transferred to floor for continued care.      Assessment & Plan:  - Pulmonary embolism (right main and RLL pulmonary arteries) currently on heparin gtt, pulm and NS agreeable to continue anticoagulate, coumadin is recommend and was started 1/1/2018 pharmacy following, goal 2.5-3, heparin gtt INR 2.0 this morning  - Intracranial hemorrhage - hx of CVA with hemorrhagic conversion, followed by neurosurgery, risk for re-hemorrhage still present, but risks for having more PE outweigh this, as such ok to AC. will need repeat CT head before discharge.  - Suspected PAF, s/p amio, cardiology following, if afib seen on monitor we should get EKG.  - Hx of HTN- this is well controlled.  - Continue Keppra for sz  - well controlled T2DM A1C 7, continue ssi  - Pharyngeal dysphagia, continue level 3 diet, intake poor, nutrition consulted      Complex  case      DVT Prophylaxis: Heparin gtt/coumadin    CODE STATUS: Full Code      Disposition: anticipate d/c in next few days as INR gets therapeutic and no signs of bleeding to [facility],  following    Dahlia Angel MD  01/04/18  12:05 PM

## 2018-01-04 NOTE — PROGRESS NOTES
Continued Stay Note  [facility]     Patient Name: Lois Brennan  MRN: [id number]  Today's Date: 1/4/2018    Admit Date: 12/22/2017          Discharge Plan       01/04/18 1105    Case Management/Social Work Plan    Plan [facility]    Patient/Family In Agreement With Plan yes    Additional Comments Spoke with Leonor at [facility].  Insurance precert has been approved for 48 hours.  Per MD, pt is not medically ready to transfer today as INR is not at desired goal.  Should pt be unable to transfer to facility by Friday, a new insurance precert will be required and a determination will not be available until Mon or Tues of next week.  Of note, Leonor relates that the facility bridge with Lovenox if that would be an option.  CM will cont to follow for DC arrangements when appropriate.                Discharge Codes     None        Expected Discharge Date and Time     Expected Discharge Date Expected Discharge Time    Jan 5, 2018             Yumiko Knight

## 2018-01-05 ENCOUNTER — APPOINTMENT (OUTPATIENT)
Dept: CT IMAGING | Facility: HOSPITAL | Age: 78
End: 2018-01-05

## 2018-01-05 ENCOUNTER — APPOINTMENT (OUTPATIENT)
Dept: CT IMAGING | Facility: HOSPITAL | Age: 78
End: 2018-01-05
Attending: NEUROLOGICAL SURGERY

## 2018-01-05 VITALS
RESPIRATION RATE: 16 BRPM | HEIGHT: 62 IN | WEIGHT: 140.4 LBS | SYSTOLIC BLOOD PRESSURE: 120 MMHG | DIASTOLIC BLOOD PRESSURE: 69 MMHG | HEART RATE: 64 BPM | BODY MASS INDEX: 25.83 KG/M2 | TEMPERATURE: 97.8 F | OXYGEN SATURATION: 96 %

## 2018-01-05 LAB
APTT PPP: 65.7 SECONDS (ref 45–60)
GLUCOSE BLDC GLUCOMTR-MCNC: 150 MG/DL (ref 70–130)
GLUCOSE BLDC GLUCOMTR-MCNC: 154 MG/DL (ref 70–130)
GLUCOSE BLDC GLUCOMTR-MCNC: 89 MG/DL (ref 70–130)
INR PPP: 2.81
PROTHROMBIN TIME: 31.6 SECONDS (ref 9.6–11.5)

## 2018-01-05 PROCEDURE — 82962 GLUCOSE BLOOD TEST: CPT

## 2018-01-05 PROCEDURE — 63710000001 PREDNISONE PER 5 MG: Performed by: INTERNAL MEDICINE

## 2018-01-05 PROCEDURE — 85610 PROTHROMBIN TIME: CPT | Performed by: INTERNAL MEDICINE

## 2018-01-05 PROCEDURE — 99239 HOSP IP/OBS DSCHRG MGMT >30: CPT | Performed by: NURSE PRACTITIONER

## 2018-01-05 PROCEDURE — 85730 THROMBOPLASTIN TIME PARTIAL: CPT

## 2018-01-05 PROCEDURE — 70450 CT HEAD/BRAIN W/O DYE: CPT

## 2018-01-05 RX ORDER — AMLODIPINE BESYLATE 5 MG/1
5 TABLET ORAL DAILY
Start: 2018-01-06

## 2018-01-05 RX ORDER — LEVETIRACETAM 500 MG/1
500 TABLET ORAL EVERY 12 HOURS SCHEDULED
Start: 2018-01-05

## 2018-01-05 RX ORDER — WARFARIN SODIUM 1 MG/1
TABLET ORAL
Start: 2018-01-05

## 2018-01-05 RX ADMIN — AMLODIPINE BESYLATE 5 MG: 5 TABLET ORAL at 08:54

## 2018-01-05 RX ADMIN — PREDNISONE 7.5 MG: 5 TABLET ORAL at 08:54

## 2018-01-05 RX ADMIN — METOPROLOL TARTRATE 12.5 MG: 25 TABLET, FILM COATED ORAL at 08:53

## 2018-01-05 RX ADMIN — PANTOPRAZOLE SODIUM 40 MG: 40 TABLET, DELAYED RELEASE ORAL at 05:51

## 2018-01-05 RX ADMIN — LEVETIRACETAM 500 MG: 500 TABLET, FILM COATED ORAL at 08:53

## 2018-01-05 NOTE — PROGRESS NOTES
"Pharmacy Consult  -  Warfarin    Lois Brennan is a  77 y.o. female   Height - 157.5 cm (62\")  Weight - 63.7 kg (140 lb 6.4 oz)    Consulting Provider: - Dr. Dahlia Angel (hospitalist)  Indication: - Pulmonary embolism  Goal INR: 2.5-3  Home Regimen: New start    Bridge Therapy: No, heparin drip stopped 1/5    Drug-Drug Interactions with current regimen:  Prednisone may increase risk of bleeding    Of note patient was taking amiodarone at home prior to admission (per Cards, discontinued on 12/29)    Warfarin Dosing During Admission:    Date  1/1 1/2 1/3 1/4 1/5       INR  1.07 1.03 1.31 2.0 2.81       Dose  2.5 mg 2.5 mg 2.5 mg 1mg  HOLD           Education Provided:  Warfarin education provided verbally and in writing.  Understanding verified with patient teach back.      Labs:    Results from last 7 days   Lab Units 01/05/18  0154 01/04/18  2017 01/04/18  1142 01/04/18  0400 01/03/18  2257 01/03/18  1324 01/03/18  0620  01/02/18  0527  01/01/18  0730  12/31/17  0514   INR  2.81 --  --  2.00 --  --  1.31 --  1.03 --  1.07 --  --    APTT seconds 65.7* 52.7 71.3* 59.6 57.2 45.9 72.1* < > 53.9 < > 52.7 < > 45.9   HEMOGLOBIN g/dL --  --  --  --  --  --  --  --  --  --  --  --  12.0   HEMATOCRIT % --  --  --  --  --  --  --  --  --  --  --  --  36.1   PLATELETS 10*3/mm3 --  --  --  --  --  --  --  --  --  --  --  --  201   < > = values in this interval not displayed.     Results from last 7 days   Lab Units 12/31/17 2002 12/31/17 0514   SODIUM mmol/L --  138   POTASSIUM mmol/L 4.7 3.4*   CHLORIDE mmol/L --  104   CO2 mmol/L --  27.0   BUN mg/dL --  19   CREATININE mg/dL --  1.10   CALCIUM mg/dL --  8.9   GLUCOSE mg/dL --  84     Dietary Intake: Dysphagia pureeddiet -50% intake    Assessment/Plan:   New start on warfarin therapy initiated 1/1/17. INR therapeutic today at 2.8, with goal 2.5-3.0. Given patient's sensitivity to warfarin and significant increase in INR overnight will recommend to HOLD tonight's dose. If " patient to discharge today to facility would further recommend to resume at warfarin 1mg daily on 1/6 with close monitoring.     aBsilio Montano, PharmD  Pharmacy Resident  1/5/2018  7:34 AM

## 2018-01-05 NOTE — PROGRESS NOTES
Continued Stay Note  [facility]     Patient Name: Lois Brennan  MRN: [id number]  Today's Date: 1/5/2018    Admit Date: 12/22/2017          Discharge Plan       01/05/18 1443    Case Management/Social Work Plan    Additional Comments Confirmed with Leonor [facility] that pt has a bed available today if medically ready.  RN to call report to [phone].  DC summary has been faxed.  Ambulance to transport at 1800.  CM will cont to follow.              Discharge Codes     None        Expected Discharge Date and Time     Expected Discharge Date Expected Discharge Time    Jan 5, 2018             Yumiko Knight

## 2018-01-05 NOTE — DISCHARGE PLACEMENT REQUEST
"TOM WADSWORTH, RN    [phone]          Lois Dixon (77 y.o. Female)     Date of Birth Social Security Number Address Home Phone MRN    1940  [id number] [facility] [address] [phone] [id number]    Jehovah's witness Marital Status          Baptism        Admission Date Admission Type Admitting Provider Attending Provider Department, Room/Bed    12/22/17 Emergency Dahlia Angel MD Opii, [place], MD [address], [address]    Discharge Date Discharge Disposition Discharge Destination         Skilled Nursing Facility (DC - External)             Attending Provider: Dahlia Angel MD     Allergies:  Ace Inhibitors    Isolation:  None   Infection:  None   Code Status:  FULL    Ht:  157.5 cm (62\")   Wt:  63.7 kg (140 lb 6.4 oz)    Admission Cmt:  None   Principal Problem:  Other pulmonary embolism without acute cor pulmonale [I26.99]                 Active Insurance as of 12/22/2017     Primary Coverage     Payor Plan Insurance Group Employer/Plan Group    ANTHEM MEDICARE REPLACEMENT ANTHEM MEDICARE ADVANTAGE [id number]     Payor Plan Address Payor Plan Phone Number Effective From Effective To    [address] [phone] 1/1/2016     [place] [address]       Subscriber Name Subscriber Birth Date Member ID       LOIS DIXON 1940 [id number]                 Emergency Contacts      (Rel.) Home Phone Work Phone Mobile Phone    Keely Leblanc (Power of ) [phone] -- [phone]               Discharge Summary      SHYANN Alexis at 1/5/2018 12:51 PM     Attestation signed by Dahlia Angel MD at 1/5/2018  1:52 PM        Attending Cosignnusrat     I supervised care of the patient on day of discharge with direct care provided by the advanced care provider (APC).    Dahlia Angel MD  01/05/18  1:51 PM                                     [facility] Medicine Services  DISCHARGE SUMMARY    Patient " Name: Lois Brennan  : 1940  MRN: [id number]    Date of Admission: 2017  Date of Discharge:  2018  Primary Care Physician: No Known Provider    Consults     Date and Time Order Name Status Description    2017 1551 Inpatient Consult to Cardiology      2017 1253 Inpatient Consult to Neurosurgery      2017 1853 Inpatient Consult to Neurology Completed     2017 1823 Inpatient Consult to Cardiology Completed     2017 1614 Inpatient Consult to Neurosurgery Completed         Hospital Course     Presenting Problem:   Altered mental status [R41.82]  Pulmonary embolism [I26.99]    Active Hospital Problems (** Indicates Principal Problem)    Diagnosis Date Noted   • **Other pulmonary embolism without acute cor pulmonale [I26.99] 2017   • 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 [R56.9] 2017   • Encephalopathy due to seizure [G93.40] 2017   • Pharyngeal dysphagia [R13.13] 2017   • GERD (gastroesophageal reflux disease) [K21.9] 2017   • Dementia [F03.90] 2017   • Rheumatoid arthritis [M06.9] 2017   • Paroxysmal atrial fibrillation [I48.0] 2017   • Type 2 diabetes mellitus with complication, without long-term current use of insulin [E11.8] 12/10/2017   • Essential hypertension [I10] 2017   • Recent intracranial hemorrhage felt related to hemorrhagic conversion of embolic infarcts in setting of Afib [I62.9] 2017      Resolved Hospital Problems    Diagnosis Date Noted Date Resolved   No resolved problems to display.      Hospital Course:  Lois Brennan is a 77 y.o. female history of diabetes, RA on chronic low-dose prednisone, and hypertension.  She presented with left-sided weakness, right frontal lobe hemorrhage with edema and initially admitted to this hospital on 17.  She had a preceding episode of transient facial weakness earlier that week.  CT revealed a 3.7 x 8.8 cm frontal hemorrhage  with some right to left shift and a smaller posterior and superior hemorrhage.  She had atrial fibrillation during her urogram.  She eventually was discharged to rehabilitation on 12/14/17.  She was readmitted on 12/22/17 after being transferred back from [facility] after developing headache, worsening mental status and worsening left-sided weakness.  There was concern for seizures.  She was doing well during this hospitalization and subsequently developed shortness of breath.  A CT angiogram done on 12/29/17 showed an acute pulmonary embolism in the right main luminary artery and lower lobe pulmonary arteries.  She was started on heparin drip and transferred to ICU.  Neurosurgery was consulted and were agreeable with anticoagulation at this time.  She was transferred to the floor on 12/31/17 and transitioned to oral anticoagulation.  Pharmacy has dosed Coumadin during hospitalization.  On day of discharge INR is 2.84.  Pharmacy recommends to hold Coumadin dose today and resume 1 mg daily.  She will need to have INR checked daily until stable with a goal of 2.5-3.5.  Cardiology was consulted and followed during hospitalization.  There was questionable paroxysmal A. fib.  Patient has been in normal sinus rhythm and amiodarone therapy was discontinued on 12/29/17 with no documented A. fib seen and chart per EKG or telemetry.  Blood pressure medications have been adjusted per cardiology.  Patient was started on Keppra for seizure activity and she will continue this at discharge.  Patient is medically stable and ready for discharge to [facility] today for continued rehab.  CT of head obtained today to re-evaluate prior to discharge.          Day of Discharge     HPI:   Patient resting in bed.  Tearful during examination.  Stating she doesn't know why no one likes her anymore.  She denies chest pain, shortness of breath, or abdominal pain.    Review of Systems  Gen- No fevers, chills  CV- No chest pain,  palpitations  Resp- No cough, dyspnea  GI- No N/V/D, abd pain    Otherwise ROS is negative except as mentioned in the HPI.    Vital Signs:   Temp:  [98.2 °F (36.8 °C)] 98.2 °F (36.8 °C)  Heart Rate:  [64-70] 70  Resp:  [16] 16  BP: (141-146)/(62-80) 141/80     Physical Exam:  Constitutional: No acute distress, awake, alert, laying in bed  Respiratory: Clear to auscultation bilaterally, nonlabored respirations   Cardiovascular: RRR, no murmurs, rubs, or gallops, palpable pedal pulses bilaterally  Gastrointestinal: Positive bowel sounds, soft, nontender, nondistended  Musculoskeletal: No bilateral ankle edema  Psychiatric: Appropriate affect, cooperative but tearful   Neurologic: Oriented x 1, LUE weakness  Skin: No rashes    Pertinent  and/or Most Recent Results       Results from last 7 days  Lab Units 12/31/17 2002 12/31/17  0514   WBC 10*3/mm3  --  7.77   HEMOGLOBIN g/dL  --  12.0   HEMATOCRIT %  --  36.1   PLATELETS 10*3/mm3  --  201   SODIUM mmol/L  --  138   POTASSIUM mmol/L 4.7 3.4*   CHLORIDE mmol/L  --  104   CO2 mmol/L  --  27.0   BUN mg/dL  --  19   CREATININE mg/dL  --  1.10   GLUCOSE mg/dL  --  84   CALCIUM mg/dL  --  8.9       Results from last 7 days  Lab Units 01/05/18  0154 01/04/18 2017 01/04/18  1142 01/04/18  0400 01/03/18  2257 01/03/18  1324 01/03/18  0620  01/02/18  0527  01/01/18  0730   PROTIME Seconds 31.6*  --   --  22.3*  --   --  14.4*  --  11.2  --  11.7*   INR  2.81  --   --  2.00  --   --  1.31  --  1.03  --  1.07   APTT seconds 65.7* 52.7 71.3* 59.6 57.2 45.9 72.1*  < > 53.9  < > 52.7   < > = values in this interval not displayed.      Brief Urine Lab Results  (Last result in the past 365 days)      Color   Clarity   Blood   Leuk Est   Nitrite   Protein   CREAT   Urine HCG        12/22/17 1513 Yellow Clear Negative Negative Negative Negative               Microbiology Results Abnormal     None          Imaging Results (all)     Procedure Component Value Units Date/Time    XR Chest 1  View [[id number]] Collected:  12/22/17 1623     Updated:  12/22/17 2203    Narrative:       EXAMINATION: XR CHEST 1 VW-12/22/2017:      INDICATION: Acute Stroke Protocol (onset < 12 hrs).      COMPARISON: 12/09/2017.     FINDINGS: Heart is normal in size. Vasculature appears normal. Mild  chronic appearing perihilar interstitial changes are stable. No new  pulmonary parenchymal disease is seen.           Impression:       No new chest disease.     D:  12/22/2017  E:  12/22/2017     This report was finalized on 12/22/2017 10:01 PM by DR. Juanjose Mancini MD.       XR Orbits 4+ View [[id number]] Collected:  12/23/17 1638     Updated:  12/23/17 2302    Narrative:          EXAMINATION: XR ORBITS, 4 VIEWS - 12/22/2017     INDICATION: R53.1-Weakness; R40.0-Somnolence; I61.1-Nontraumatic  intracerebral hemorrhage in hemisphere, cortical; I48.0-Paroxysmal  atrial fibrillation; E11.69-Type 2 diabetes mellitus with other  specified complication; E66.9-Obesity, unspecified; I10-Essential  (primary) hypertension.      COMPARISON: Head CT scan of same date.     FINDINGS: Patient's known scleral band on the right is nonradiopaque.  There is no evidence of metallic foreign body regional to the orbits.  Included paranasal sinuses appear clear. Bony structures appear grossly  intact.           Impression:       No evidence of radiopaque foreign body.     DICTATED:     12/22/2017  EDITED:          12/23/2017     This report was finalized on 12/23/2017 11:00 PM by DR. Juanjose Mancini MD.       CT Head Without Contrast Stroke Protocol [[id number]] Collected:  12/23/17 1608     Updated:  12/23/17 2318    Narrative:       EXAMINATION: CT HEAD WO CONTRAST - 12/22/2017      INDICATION: Stroke protocol.     TECHNIQUE: 5 mm unenhanced images through the brain.     The radiation dose reduction device was turned on for each scan per the  ALARA (As Low as Reasonably Achievable) protocol.     COMPARISON: 12/10/2017 head CT scan.     FINDINGS: There is  expected aging of the patient's right frontal  hemorrhage, decreased in density and decreased in size. There is stable  surrounding vasogenic edema. The patient's more superior subcentimeter  hemorrhage has resolved. Hemorrhage in the right parieto-occipital  region has effectively resolved. There is no evidence of new edema, new  hemorrhage, interval infarction, hydrocephalus or abnormal extra-axial  collection. Incidental note is again made of left maxillary sinusitis,  also minimal new right maxillary sinus disease.       Impression:       Resolving intracranial hemorrhage and stable right frontal  lobe edema. No new intracranial abnormality is identified. Incidentally  noted left maxillary sinus disease.     Note: Exam time is shown as 1444. Study was reviewed on the CT scan  monitor and discussed with the ER physician at 1447 by Dr. Enrique.     DICTATED:     12/22/2017  EDITED:          12/23/2017           This report was finalized on 12/23/2017 11:16 PM by DR. Juanjose Mancini MD.       MRI Brain Without Contrast [[id number]] Collected:  12/23/17 1643     Updated:  12/23/17 2348    Narrative:       EXAMINATION: MRI BRAIN WO CONTRAST - 12/22/2017     INDICATION:  R53.1-Weakness; R40.0-Somnolence; I61.1-Nontraumatic  intracerebral hemorrhage in hemisphere, cortical; I48.0-Paroxysmal  atrial fibrillation; E11.69-Type 2 diabetes mellitus with other  specified complication; E66.9-Obesity, unspecified; I10-Essential  (primary) hypertension.     TECHNIQUE: Sagittal and axial T1 and axial T2 FLAIR and  diffusion-weighted images of the brain. Coronal susceptibility weighted  images of the brain.     COMPARISON: Head CT scan of same date and previous head CT scans.     FINDINGS: Diffusion-weighted series shows areas of diffusion restriction  correlating the patient's multifocal, resolving hemorrhages. These areas  correspond to susceptibility weighted images as well on the heme  sensitive sequence.     There is also a 6 mm area  of diffusion restriction in the posterior left  temporal lobe which shows no relation to previous hemorrhage.   There  appears be stable mass effect from patient's large right frontal  hemorrhage, which measures roughly 3.5 to 4 cm in diameter on both this  study and the 12/09/2017 head CT scan. No mass effect is seen elsewhere.  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        Impression:       Multifocal hemorrhage including the patient's large right  frontal parenchymal hemorrhage. Chronic-appearing white matter changes,  and also a small acute infarct of the posterior left temporal lobe. No  evidence of acute infarction or other new disease elsewhere.      DICTATED:     12/22/2017  EDITED:          12/23/2017     This report was finalized on 12/23/2017 11:45 PM by DR. Juanjose Mancini MD.       FL Video Swallow With Speech [[id number]] Collected:  12/25/17 1617     Updated:  12/25/17 2320    Narrative:       EXAMINATION: FLUOROSCOPIC VIDEO SWALLOW WITH SPEECH-12/25/2017:     INDICATION: R/o silent aspiration.; R13.13-Dysphagia, pharyngeal phase;  R53.1-Weakness; R40.0-Somnolence; I61.1-Nontraumatic intracerebral  hemorrhage in hemisphere, cortical; I48.0-Paroxysmal atrial  fibrillation; E11.69-Type 2 diabetes mellitus with other specified  complication; E66.9-Obesity, unspecified; I10-Essential (primary)  hypertension; Z74.09-Other reduced mobility; Z74.09-Other reduced  mobility.         COMPARISON: NONE.     FINDINGS: The dictation is to record 1 minute and 24 seconds of  fluoroscopy time. The patient was evaluated in the seated lateral  position while taking a variety of consistencies by mouth under the  direction of speech pathology. There is trace penetration on 1 or 2  occasions which spontaneously cleared. There was no evidence of  aspiration. Please see the speech pathologist's report for full details  and recommendations..       Impression:        Fluoroscopy provided for modified barium swallow.     D:  12/25/2017  E:  12/25/2017            This report was finalized on 12/25/2017 11:18 PM by DR. Juanjose Mancini MD.       NM Lung Ventilation Perfusion [[id number]] Collected:  12/29/17 0801     Updated:  12/29/17 0841    Narrative:       EXAMINATION: NM LUNG VENTILATION PERFUSION-     INDICATION: Shortness of air; R13.13-Dysphagia, pharyngeal phase;  R53.1-Weakness; R40.0-Somnolence; I61.1-Nontraumatic intracerebral  hemorrhage in hemisphere, cortical; I48.0-Paroxysmal atrial  fibrillation; E11.69-Type 2 diabetes mellitus with other specified  complication; E66.9-Obesity, unspecified; I10-Essential (primary)  hypertension; Z74.09-Other reduced mobility.     TECHNIQUE: Ventilation perfusion lung scan was performed using Xenon and  technetium MAA respectively.     COMPARISON: Chest x-ray of the same day.     FINDINGS: The ventilatory scan demonstrates a normal wash-in phase.  However, there is significant tracer retention in the lung bases on the  washout phases, greater on the right than on the left. The perfusion  images demonstrate diminished perfusion in the right lung base as well  as with minimal defects in the upper lobes posteriorly. The scan is  indeterminate for the diagnosis of pulmonary embolus.       Impression:       Abnormal ventilatory scan with significant chronic  obstructive airways disease. The perfusion scan is partially matched but  not completely. Therefore the scan is indeterminate for diagnosis of  pulmonary embolus.      D:  12/28/2017  E:  12/29/2017     This report was finalized on 12/29/2017 8:38 AM by Dr. Azam Lunsford MD.       CT Angiogram Chest With & Without Contrast [[id number]] Collected:  12/29/17 1046     Updated:  12/29/17 1055    Narrative:       EXAMINATION: CT ANGIOGRAM CHEST W WO CONTRAST- 12/29/2017     INDICATION: R13.13-Dysphagia, pharyngeal phase; R53.1-Weakness;  R40.0-Somnolence; I61.1-Nontraumatic intracerebral  hemorrhage in  hemisphere, cortical; I48.0-Paroxysmal atrial fibrillation; E11.69-Type  2 diabetes mellitus with other specified complication; E66.9-Obesity,  unspecified; I10-Essential (primary) hypertension; Z74.09-Other reduced  mobility; shortness of breath, chest pain, atrial fibrillation     TECHNIQUE: Multiple axial CT imaging was obtained of the chest from the  thoracic inlet through the adrenal glands following the administration  of intravenous contrast. 2-D coronal reformatted images were submitted  to further facilitate diagnostic accuracy and treatment planning.     The radiation dose reduction device was turned on for each scan per the  ALARA (As Low as Reasonably Achievable) protocol.     COMPARISON: NONE     FINDINGS: There are filling defects identified within the right lower  lobe pulmonary arteries to suggest evidence of pulmonary embolism.  Motion artifact degrades image quality of the cardiac chambers for  determination of the RV/LV ratio which is approximately 1.1. There is no  pericardial effusion. The thoracic aorta reveals atherosclerotic  disease. Coronary artery calcification is identified. The lung  parenchyma is grossly clear. Minimal atelectatic changes identified  within the left lower lobe. There is a small patchy area of groundglass  opacity seen inferiorly within the right upper lobe suggesting possible  atelectatic change or infiltrate. The visualized portions of the upper  abdomen reveal a small cyst seen at the tail of the pancreas measuring  approximately 1.6 cm. There are degenerative changes seen within the  spine.       Impression:       1. PE seen within the right main and lower lobe pulmonary arteries.  2. Atelectatic changes seen within the left lung base.  3. RV/LV ratio difficult to determine due to motion artifact however  approximately 1.1.  4. Small cyst seen at the tail of the pancreas.     D:  12/29/2017  E:  12/29/2017        This report was finalized on 12/29/2017  10:53 AM by Dr. Bhumika De Paz MD.       XR Chest 1 View [[id number]] Collected:  12/29/17 0905     Updated:  12/29/17 1100    Narrative:       EXAMINATION: XR CHEST 1 VW-12/28/2017:      INDICATION: SOA; R13.13-Dysphagia, pharyngeal phase; R53.1-Weakness;  R40.0-Somnolence; I61.1-Nontraumatic intracerebral hemorrhage in  hemisphere, cortical; I48.0-Paroxysmal atrial fibrillation; E11.69-Type  2 diabetes mellitus with other specified complication; E66.9-Obesity,  unspecified; I10-Essential (primary) hypertension; Z74.09-Other reduced  mobility; Z74.09-Other reduced mobility.      COMPARISON: 12/22/2017.     FINDINGS: Portable chest reveals cardiac and mediastinal silhouettes to  be within normal limits. The lung fields are grossly clear. No focal  parenchymal opacification present.  No pleural effusion or pneumothorax.  Degenerative changes seen within the spine. Pulmonary vascularity is  within normal limits.           Impression:       No acute cardiopulmonary disease.     D:  12/29/2017  E:  12/29/2017     This report was finalized on 12/29/2017 10:58 AM by Dr. Bhumika De Paz MD.       CT Head Without Contrast [[id number]] Collected:  12/29/17 1449     Updated:  12/29/17 1545    Narrative:       EXAMINATION: CT HEAD WO CONTRAST- 12/29/2017     INDICATION: R13.13-Dysphagia, pharyngeal phase; R53.1-Weakness;  R40.0-Somnolence; I61.1-Nontraumatic intracerebral hemorrhage in  hemisphere, cortical; I48.0-Paroxysmal atrial fibrillation; E11.69-Type  2 diabetes mellitus with other specified complication; E66.9-Obesity,  unspecified; I10-Essential (primary) hypertension; Z74.09-Other reduced  mobility; intracerebral hemorrhage     TECHNIQUE: Multiple axial CT images obtained of the head from skull base  to skull vertex without the administration of intravenous contrast.     The radiation dose reduction device was turned on for each scan per the  ALARA (As Low as Reasonably Achievable) protocol.     COMPARISON:  12/10/2017     FINDINGS: There is continued evolution identified of the large  intraparenchymal hemorrhage involving the right frontoparietal lobe. The  surrounding edema is unchanged. There is no new hemorrhage identified.  Smaller intraparenchymal hemorrhage is seen posteriorly within the right  parietal lobe which is also evolving in the interval with minimal  surrounding edema and mass effect. Extensive chronic small vessel  ischemic changes seen within the periventricular and subcortical white  matter. No new hemorrhage identified. Mucous retention cyst seen in the  left maxillary sinus. The remainder of the visualized paranasal sinuses  are clear. Mastoid air cells remain patent.       Impression:       Evolving large right frontoparietal intraparenchymal  hemorrhage. The surrounding edema and mass effect is stable and  unchanged. There is no midline shift. No new hemorrhage. Smaller  hemorrhage is also evolving and stable.     D:  12/29/2017  E:  12/29/2017        This report was finalized on 12/29/2017 3:42 PM by Dr. Bhumika De Paz MD.       CT Head Without Contrast [[id number]] Collected:  12/31/17 0834     Updated:  12/31/17 1639    Narrative:       EXAMINATION: CT HEAD WO CONTRAST - 12/31/2017     INDICATION: Headache.     TECHNIQUE: Axial CT of the head without intravenous contrast  administration.     The radiation dose reduction device was turned on for each scan per the  ALARA (As Low as Reasonably Achievable) protocol.     COMPARISON: CT head 12/29/2017.     FINDINGS: Evolving large intraparenchymal hemorrhage involving the right  frontal lobe is without significant interval change. Persistent  significant vasogenic edema of the right frontal lobe and cerebral  hemisphere. No new hemorrhage is identified. Ventricles and sulci are  within similar configuration without developing hydrocephalus. No  midline shift. Small area of high attenuation within the right parietal  lobe is no longer clearly  identified. Basal cisterns are patent. Globes  and orbits unremarkable. Visualized paranasal sinuses and mastoid air  cells demonstrate layering fluid within the left maxillary sinus,  otherwise grossly clear and well-pneumatized. Calvarium intact.       Impression:       No significant interval change in appearance of evolving  right frontal lobe intraparenchymal hemorrhage with surrounding  vasogenic edema. No new hemorrhage, midline shift or mass effect is  identified. Previously noted right parietal smaller area of hemorrhage  is no longer clearly evident and may represent conspicuity from  degradation of blood products.     DICTATED:     12/31/2017  EDITED:          12/31/2017        This report was finalized on 12/31/2017 4:37 PM by Dr. Fernie Enrique.             Results for orders placed during the hospital encounter of 12/09/17   Adult Transthoracic Echo Complete W/ Cont if Necessary Per Protocol (With Agitated Saline)    Narrative · Left ventricular systolic function is normal. Estimated EF = 70%.  · Normal right ventricular cavity size, wall thickness, systolic function   and septal motion noted.  · Saline test results are negative.  · The aortic valve exhibits sclerosis.  · No evidence of pulmonary hypertension is present.  · There is no evidence of pericardial effusion.            Discharge Details      Lois Brennan   Home Medication Instructions KIMBERLEE:[id number]    Printed on:01/05/18 1317   Medication Information                      amLODIPine (NORVASC) 5 MG tablet  Take 1 tablet by mouth Daily.             donepezil (ARICEPT) 10 MG tablet  Take 10 mg by mouth Every Night.             levETIRAcetam (KEPPRA) 500 MG tablet  Take 1 tablet by mouth Every 12 (Twelve) Hours.             metFORMIN ER (GLUCOPHAGE-XR) 500 MG 24 hr tablet  Take 500 mg by mouth Daily With Breakfast.             metoprolol tartrate (LOPRESSOR) 25 MG tablet  Take 0.5 tablets by mouth Every 12 (Twelve) Hours.              predniSONE (DELTASONE) 5 MG tablet  Take 7.5 mg by mouth Daily With Breakfast.             raNITIdine (ZANTAC) 300 MG tablet  Take 300 mg by mouth 2 (Two) Times a Day.             simvastatin (ZOCOR) 20 MG tablet  Take 20 mg by mouth Every Night.                 Discharge Disposition:  Skilled Nursing Facility (DC - External)    Discharge Diet:  Diet Instructions     Diet: Dysphagia; Thin Liquids, No Restrictions; Pureed       Discharge Diet:  Dysphagia   Fluid Consistency:  Thin Liquids, No Restrictions   Pureed Options:  Pureed                 Discharge Activity:   Activity Instructions     Activity as Tolerated                     Special Instructions:    Future Appointments  Date Time Provider Department Center   1/23/2018 3:30 PM Soy Bauman III, MD [facility] None       Additional Instructions for the Follow-ups that You Need to Schedule     Discharge Follow-up with PCP    As directed    Follow Up Details:  within 1 week for hospital follow up                     Time Spent on Discharge:  45 minutes    SHYANN Alexis  01/05/18  1:17 PM       Electronically signed by Dahlia Angel MD at 1/5/2018  1:52 PM

## 2018-01-05 NOTE — PLAN OF CARE
Problem: Patient Care Overview (Adult)  Goal: Plan of Care Review  Outcome: Ongoing (interventions implemented as appropriate)   01/05/18 0235   Coping/Psychosocial Response Interventions   Plan Of Care Reviewed With patient   Outcome Evaluation   Outcome Summary/Follow up Plan resting quietly without complaints   Patient Care Overview   Progress progress towards functional goals is fair       Problem: Fall Risk (Adult)  Goal: Absence of Falls  Outcome: Ongoing (interventions implemented as appropriate)      Problem: Diabetes, Type 2 (Adult)  Goal: Signs and Symptoms of Listed Potential Problems Will be Absent or Manageable (Diabetes, Type 2)  Outcome: Ongoing (interventions implemented as appropriate)      Problem: Confusion, Acute (Adult)  Goal: Cognitive/Functional Impairments Minimized  Outcome: Ongoing (interventions implemented as appropriate)      Problem: Skin Integrity Impairment, Risk/Actual (Adult)  Goal: Skin Integrity/Wound Healing  Outcome: Ongoing (interventions implemented as appropriate)

## 2018-01-05 NOTE — DISCHARGE SUMMARY
[facility]  DISCHARGE SUMMARY    Patient Name: Lois Brennan  : 1940  MRN: [id number]    Date of Admission: 2017  Date of Discharge:  2018  Primary Care Physician: No Known Provider    Consults     Date and Time Order Name Status Description    2017 1551 Inpatient Consult to Cardiology      2017 1253 Inpatient Consult to Neurosurgery      2017 1853 Inpatient Consult to Neurology Completed     2017 1823 Inpatient Consult to Cardiology Completed     2017 1614 Inpatient Consult to Neurosurgery Completed         Hospital Course     Presenting Problem:   Altered mental status [R41.82]  Pulmonary embolism [I26.99]    Active Hospital Problems (** Indicates Principal Problem)    Diagnosis Date Noted   • **Other pulmonary embolism without acute cor pulmonale [I26.99] 2017   • 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 [R56.9] 2017   • Encephalopathy due to seizure [G93.40] 2017   • Pharyngeal dysphagia [R13.13] 2017   • GERD (gastroesophageal reflux disease) [K21.9] 2017   • Dementia [F03.90] 2017   • Rheumatoid arthritis [M06.9] 2017   • Paroxysmal atrial fibrillation [I48.0] 2017   • Type 2 diabetes mellitus with complication, without long-term current use of insulin [E11.8] 12/10/2017   • Essential hypertension [I10] 2017   • Recent intracranial hemorrhage felt related to hemorrhagic conversion of embolic infarcts in setting of Afib [I62.9] 2017      Resolved Hospital Problems    Diagnosis Date Noted Date Resolved   No resolved problems to display.      Hospital Course:  Lois Brennan is a 77 y.o. female history of diabetes, RA on chronic low-dose prednisone, and hypertension.  She presented with left-sided weakness, right frontal lobe hemorrhage with edema and initially admitted to this hospital on 17.  She had a preceding episode of transient  facial weakness earlier that week.  CT revealed a 3.7 x 8.8 cm frontal hemorrhage with some right to left shift and a smaller posterior and superior hemorrhage.  She had atrial fibrillation during her urogram.  She eventually was discharged to rehabilitation on 12/14/17.  She was readmitted on 12/22/17 after being transferred back from [facility] after developing headache, worsening mental status and worsening left-sided weakness.  There was concern for seizures.  She was doing well during this hospitalization and subsequently developed shortness of breath.  A CT angiogram done on 12/29/17 showed an acute pulmonary embolism in the right main luminary artery and lower lobe pulmonary arteries.  She was started on heparin drip and transferred to ICU.  Neurosurgery was consulted and were agreeable with anticoagulation at this time.  She was transferred to the floor on 12/31/17 and transitioned to oral anticoagulation.  Pharmacy has dosed Coumadin during hospitalization.  On day of discharge INR is 2.84.  Pharmacy recommends to hold Coumadin dose today and resume 1 mg daily.  She will need to have INR checked daily until stable with a goal of 2.5-3.5.  Cardiology was consulted and followed during hospitalization.  There was questionable paroxysmal A. fib.  Patient has been in normal sinus rhythm and amiodarone therapy was discontinued on 12/29/17 with no documented A. fib seen and chart per EKG or telemetry.  Blood pressure medications have been adjusted per cardiology.  Patient was started on Keppra for seizure activity and she will continue this at discharge.  Patient is medically stable and ready for discharge to [facility] today for continued rehab.  CT of head obtained today to re-evaluate prior to discharge.          Day of Discharge     HPI:   Patient resting in bed.  Tearful during examination.  Stating she doesn't know why no one likes her anymore.  She denies chest pain, shortness of breath, or  abdominal pain.    Review of Systems  Gen- No fevers, chills  CV- No chest pain, palpitations  Resp- No cough, dyspnea  GI- No N/V/D, abd pain    Otherwise ROS is negative except as mentioned in the HPI.    Vital Signs:   Temp:  [98.2 °F (36.8 °C)] 98.2 °F (36.8 °C)  Heart Rate:  [64-70] 70  Resp:  [16] 16  BP: (141-146)/(62-80) 141/80     Physical Exam:  Constitutional: No acute distress, awake, alert, laying in bed  Respiratory: Clear to auscultation bilaterally, nonlabored respirations   Cardiovascular: RRR, no murmurs, rubs, or gallops, palpable pedal pulses bilaterally  Gastrointestinal: Positive bowel sounds, soft, nontender, nondistended  Musculoskeletal: No bilateral ankle edema  Psychiatric: Appropriate affect, cooperative but tearful   Neurologic: Oriented x 1, LUE weakness  Skin: No rashes    Pertinent  and/or Most Recent Results       Results from last 7 days  Lab Units 12/31/17 2002 12/31/17  0514   WBC 10*3/mm3  --  7.77   HEMOGLOBIN g/dL  --  12.0   HEMATOCRIT %  --  36.1   PLATELETS 10*3/mm3  --  201   SODIUM mmol/L  --  138   POTASSIUM mmol/L 4.7 3.4*   CHLORIDE mmol/L  --  104   CO2 mmol/L  --  27.0   BUN mg/dL  --  19   CREATININE mg/dL  --  1.10   GLUCOSE mg/dL  --  84   CALCIUM mg/dL  --  8.9       Results from last 7 days  Lab Units 01/05/18  0154 01/04/18 2017 01/04/18  1142 01/04/18  0400 01/03/18  2257 01/03/18  1324 01/03/18  0620  01/02/18  0527  01/01/18  0730   PROTIME Seconds 31.6*  --   --  22.3*  --   --  14.4*  --  11.2  --  11.7*   INR  2.81  --   --  2.00  --   --  1.31  --  1.03  --  1.07   APTT seconds 65.7* 52.7 71.3* 59.6 57.2 45.9 72.1*  < > 53.9  < > 52.7   < > = values in this interval not displayed.      Brief Urine Lab Results  (Last result in the past 365 days)      Color   Clarity   Blood   Leuk Est   Nitrite   Protein   CREAT   Urine HCG        12/22/17 1513 Yellow Clear Negative Negative Negative Negative               Microbiology Results Abnormal     None           Imaging Results (all)     Procedure Component Value Units Date/Time    XR Chest 1 View [[id number]] Collected:  12/22/17 1623     Updated:  12/22/17 2203    Narrative:       EXAMINATION: XR CHEST 1 VW-12/22/2017:      INDICATION: Acute Stroke Protocol (onset < 12 hrs).      COMPARISON: 12/09/2017.     FINDINGS: Heart is normal in size. Vasculature appears normal. Mild  chronic appearing perihilar interstitial changes are stable. No new  pulmonary parenchymal disease is seen.           Impression:       No new chest disease.     D:  12/22/2017  E:  12/22/2017     This report was finalized on 12/22/2017 10:01 PM by DR. Juanjose Mancini MD.       XR Orbits 4+ View [[id number]] Collected:  12/23/17 1638     Updated:  12/23/17 2302    Narrative:          EXAMINATION: XR ORBITS, 4 VIEWS - 12/22/2017     INDICATION: R53.1-Weakness; R40.0-Somnolence; I61.1-Nontraumatic  intracerebral hemorrhage in hemisphere, cortical; I48.0-Paroxysmal  atrial fibrillation; E11.69-Type 2 diabetes mellitus with other  specified complication; E66.9-Obesity, unspecified; I10-Essential  (primary) hypertension.      COMPARISON: Head CT scan of same date.     FINDINGS: Patient's known scleral band on the right is nonradiopaque.  There is no evidence of metallic foreign body regional to the orbits.  Included paranasal sinuses appear clear. Bony structures appear grossly  intact.           Impression:       No evidence of radiopaque foreign body.     DICTATED:     12/22/2017  EDITED:          12/23/2017     This report was finalized on 12/23/2017 11:00 PM by DR. Juanjose Mancini MD.       CT Head Without Contrast Stroke Protocol [[id number]] Collected:  12/23/17 1608     Updated:  12/23/17 2318    Narrative:       EXAMINATION: CT HEAD WO CONTRAST - 12/22/2017      INDICATION: Stroke protocol.     TECHNIQUE: 5 mm unenhanced images through the brain.     The radiation dose reduction device was turned on for each scan per the  ALARA (As Low as Reasonably  Achievable) protocol.     COMPARISON: 12/10/2017 head CT scan.     FINDINGS: There is expected aging of the patient's right frontal  hemorrhage, decreased in density and decreased in size. There is stable  surrounding vasogenic edema. The patient's more superior subcentimeter  hemorrhage has resolved. Hemorrhage in the right parieto-occipital  region has effectively resolved. There is no evidence of new edema, new  hemorrhage, interval infarction, hydrocephalus or abnormal extra-axial  collection. Incidental note is again made of left maxillary sinusitis,  also minimal new right maxillary sinus disease.       Impression:       Resolving intracranial hemorrhage and stable right frontal  lobe edema. No new intracranial abnormality is identified. Incidentally  noted left maxillary sinus disease.     Note: Exam time is shown as 1444. Study was reviewed on the CT scan  monitor and discussed with the ER physician at 1447 by Dr. Enrique.     DICTATED:     12/22/2017  EDITED:          12/23/2017           This report was finalized on 12/23/2017 11:16 PM by DR. Juanjose Mancini MD.       MRI Brain Without Contrast [[id number]] Collected:  12/23/17 1643     Updated:  12/23/17 2348    Narrative:       EXAMINATION: MRI BRAIN WO CONTRAST - 12/22/2017     INDICATION:  R53.1-Weakness; R40.0-Somnolence; I61.1-Nontraumatic  intracerebral hemorrhage in hemisphere, cortical; I48.0-Paroxysmal  atrial fibrillation; E11.69-Type 2 diabetes mellitus with other  specified complication; E66.9-Obesity, unspecified; I10-Essential  (primary) hypertension.     TECHNIQUE: Sagittal and axial T1 and axial T2 FLAIR and  diffusion-weighted images of the brain. Coronal susceptibility weighted  images of the brain.     COMPARISON: Head CT scan of same date and previous head CT scans.     FINDINGS: Diffusion-weighted series shows areas of diffusion restriction  correlating the patient's multifocal, resolving hemorrhages. These areas  correspond to susceptibility  weighted images as well on the heme  sensitive sequence.     There is also a 6 mm area of diffusion restriction in the posterior left  temporal lobe which shows no relation to previous hemorrhage.   There  appears be stable mass effect from patient's large right frontal  hemorrhage, which measures roughly 3.5 to 4 cm in diameter on both this  study and the 12/09/2017 head CT scan. No mass effect is seen elsewhere.  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        Impression:       Multifocal hemorrhage including the patient's large right  frontal parenchymal hemorrhage. Chronic-appearing white matter changes,  and also a small acute infarct of the posterior left temporal lobe. No  evidence of acute infarction or other new disease elsewhere.      DICTATED:     12/22/2017  EDITED:          12/23/2017     This report was finalized on 12/23/2017 11:45 PM by DR. Juanjose Mancini MD.       FL Video Swallow With Speech [[id number]] Collected:  12/25/17 1617     Updated:  12/25/17 2320    Narrative:       EXAMINATION: FLUOROSCOPIC VIDEO SWALLOW WITH SPEECH-12/25/2017:     INDICATION: R/o silent aspiration.; R13.13-Dysphagia, pharyngeal phase;  R53.1-Weakness; R40.0-Somnolence; I61.1-Nontraumatic intracerebral  hemorrhage in hemisphere, cortical; I48.0-Paroxysmal atrial  fibrillation; E11.69-Type 2 diabetes mellitus with other specified  complication; E66.9-Obesity, unspecified; I10-Essential (primary)  hypertension; Z74.09-Other reduced mobility; Z74.09-Other reduced  mobility.         COMPARISON: NONE.     FINDINGS: The dictation is to record 1 minute and 24 seconds of  fluoroscopy time. The patient was evaluated in the seated lateral  position while taking a variety of consistencies by mouth under the  direction of speech pathology. There is trace penetration on 1 or 2  occasions which spontaneously cleared. There was no evidence of  aspiration. Please see the speech  pathologist's report for full details  and recommendations..       Impression:       Fluoroscopy provided for modified barium swallow.     D:  12/25/2017  E:  12/25/2017            This report was finalized on 12/25/2017 11:18 PM by DR. Juanjose Mancini MD.       NM Lung Ventilation Perfusion [[id number]] Collected:  12/29/17 0801     Updated:  12/29/17 0841    Narrative:       EXAMINATION: NM LUNG VENTILATION PERFUSION-     INDICATION: Shortness of air; R13.13-Dysphagia, pharyngeal phase;  R53.1-Weakness; R40.0-Somnolence; I61.1-Nontraumatic intracerebral  hemorrhage in hemisphere, cortical; I48.0-Paroxysmal atrial  fibrillation; E11.69-Type 2 diabetes mellitus with other specified  complication; E66.9-Obesity, unspecified; I10-Essential (primary)  hypertension; Z74.09-Other reduced mobility.     TECHNIQUE: Ventilation perfusion lung scan was performed using Xenon and  technetium MAA respectively.     COMPARISON: Chest x-ray of the same day.     FINDINGS: The ventilatory scan demonstrates a normal wash-in phase.  However, there is significant tracer retention in the lung bases on the  washout phases, greater on the right than on the left. The perfusion  images demonstrate diminished perfusion in the right lung base as well  as with minimal defects in the upper lobes posteriorly. The scan is  indeterminate for the diagnosis of pulmonary embolus.       Impression:       Abnormal ventilatory scan with significant chronic  obstructive airways disease. The perfusion scan is partially matched but  not completely. Therefore the scan is indeterminate for diagnosis of  pulmonary embolus.      D:  12/28/2017  E:  12/29/2017     This report was finalized on 12/29/2017 8:38 AM by Dr. Azam Lunsford MD.       CT Angiogram Chest With & Without Contrast [[id number]] Collected:  12/29/17 1046     Updated:  12/29/17 1055    Narrative:       EXAMINATION: CT ANGIOGRAM CHEST W WO CONTRAST- 12/29/2017     INDICATION: R13.13-Dysphagia, pharyngeal  phase; R53.1-Weakness;  R40.0-Somnolence; I61.1-Nontraumatic intracerebral hemorrhage in  hemisphere, cortical; I48.0-Paroxysmal atrial fibrillation; E11.69-Type  2 diabetes mellitus with other specified complication; E66.9-Obesity,  unspecified; I10-Essential (primary) hypertension; Z74.09-Other reduced  mobility; shortness of breath, chest pain, atrial fibrillation     TECHNIQUE: Multiple axial CT imaging was obtained of the chest from the  thoracic inlet through the adrenal glands following the administration  of intravenous contrast. 2-D coronal reformatted images were submitted  to further facilitate diagnostic accuracy and treatment planning.     The radiation dose reduction device was turned on for each scan per the  ALARA (As Low as Reasonably Achievable) protocol.     COMPARISON: NONE     FINDINGS: There are filling defects identified within the right lower  lobe pulmonary arteries to suggest evidence of pulmonary embolism.  Motion artifact degrades image quality of the cardiac chambers for  determination of the RV/LV ratio which is approximately 1.1. There is no  pericardial effusion. The thoracic aorta reveals atherosclerotic  disease. Coronary artery calcification is identified. The lung  parenchyma is grossly clear. Minimal atelectatic changes identified  within the left lower lobe. There is a small patchy area of groundglass  opacity seen inferiorly within the right upper lobe suggesting possible  atelectatic change or infiltrate. The visualized portions of the upper  abdomen reveal a small cyst seen at the tail of the pancreas measuring  approximately 1.6 cm. There are degenerative changes seen within the  spine.       Impression:       1. PE seen within the right main and lower lobe pulmonary arteries.  2. Atelectatic changes seen within the left lung base.  3. RV/LV ratio difficult to determine due to motion artifact however  approximately 1.1.  4. Small cyst seen at the tail of the pancreas.     D:   12/29/2017  E:  12/29/2017        This report was finalized on 12/29/2017 10:53 AM by Dr. Bhumika De Paz MD.       XR Chest 1 View [[id number]] Collected:  12/29/17 0905     Updated:  12/29/17 1100    Narrative:       EXAMINATION: XR CHEST 1 VW-12/28/2017:      INDICATION: SOA; R13.13-Dysphagia, pharyngeal phase; R53.1-Weakness;  R40.0-Somnolence; I61.1-Nontraumatic intracerebral hemorrhage in  hemisphere, cortical; I48.0-Paroxysmal atrial fibrillation; E11.69-Type  2 diabetes mellitus with other specified complication; E66.9-Obesity,  unspecified; I10-Essential (primary) hypertension; Z74.09-Other reduced  mobility; Z74.09-Other reduced mobility.      COMPARISON: 12/22/2017.     FINDINGS: Portable chest reveals cardiac and mediastinal silhouettes to  be within normal limits. The lung fields are grossly clear. No focal  parenchymal opacification present.  No pleural effusion or pneumothorax.  Degenerative changes seen within the spine. Pulmonary vascularity is  within normal limits.           Impression:       No acute cardiopulmonary disease.     D:  12/29/2017  E:  12/29/2017     This report was finalized on 12/29/2017 10:58 AM by Dr. Bhumika De Paz MD.       CT Head Without Contrast [[id number]] Collected:  12/29/17 1449     Updated:  12/29/17 1545    Narrative:       EXAMINATION: CT HEAD WO CONTRAST- 12/29/2017     INDICATION: R13.13-Dysphagia, pharyngeal phase; R53.1-Weakness;  R40.0-Somnolence; I61.1-Nontraumatic intracerebral hemorrhage in  hemisphere, cortical; I48.0-Paroxysmal atrial fibrillation; E11.69-Type  2 diabetes mellitus with other specified complication; E66.9-Obesity,  unspecified; I10-Essential (primary) hypertension; Z74.09-Other reduced  mobility; intracerebral hemorrhage     TECHNIQUE: Multiple axial CT images obtained of the head from skull base  to skull vertex without the administration of intravenous contrast.     The radiation dose reduction device was turned on for each scan  per the  ALARA (As Low as Reasonably Achievable) protocol.     COMPARISON: 12/10/2017     FINDINGS: There is continued evolution identified of the large  intraparenchymal hemorrhage involving the right frontoparietal lobe. The  surrounding edema is unchanged. There is no new hemorrhage identified.  Smaller intraparenchymal hemorrhage is seen posteriorly within the right  parietal lobe which is also evolving in the interval with minimal  surrounding edema and mass effect. Extensive chronic small vessel  ischemic changes seen within the periventricular and subcortical white  matter. No new hemorrhage identified. Mucous retention cyst seen in the  left maxillary sinus. The remainder of the visualized paranasal sinuses  are clear. Mastoid air cells remain patent.       Impression:       Evolving large right frontoparietal intraparenchymal  hemorrhage. The surrounding edema and mass effect is stable and  unchanged. There is no midline shift. No new hemorrhage. Smaller  hemorrhage is also evolving and stable.     D:  12/29/2017  E:  12/29/2017        This report was finalized on 12/29/2017 3:42 PM by Dr. Bhumika De Paz MD.       CT Head Without Contrast [[id number]] Collected:  12/31/17 0834     Updated:  12/31/17 1639    Narrative:       EXAMINATION: CT HEAD WO CONTRAST - 12/31/2017     INDICATION: Headache.     TECHNIQUE: Axial CT of the head without intravenous contrast  administration.     The radiation dose reduction device was turned on for each scan per the  ALARA (As Low as Reasonably Achievable) protocol.     COMPARISON: CT head 12/29/2017.     FINDINGS: Evolving large intraparenchymal hemorrhage involving the right  frontal lobe is without significant interval change. Persistent  significant vasogenic edema of the right frontal lobe and cerebral  hemisphere. No new hemorrhage is identified. Ventricles and sulci are  within similar configuration without developing hydrocephalus. No  midline shift. Small area  of high attenuation within the right parietal  lobe is no longer clearly identified. Basal cisterns are patent. Globes  and orbits unremarkable. Visualized paranasal sinuses and mastoid air  cells demonstrate layering fluid within the left maxillary sinus,  otherwise grossly clear and well-pneumatized. Calvarium intact.       Impression:       No significant interval change in appearance of evolving  right frontal lobe intraparenchymal hemorrhage with surrounding  vasogenic edema. No new hemorrhage, midline shift or mass effect is  identified. Previously noted right parietal smaller area of hemorrhage  is no longer clearly evident and may represent conspicuity from  degradation of blood products.     DICTATED:     12/31/2017  EDITED:          12/31/2017        This report was finalized on 12/31/2017 4:37 PM by Dr. Fernie Enrique.             Results for orders placed during the hospital encounter of 12/09/17   Adult Transthoracic Echo Complete W/ Cont if Necessary Per Protocol (With Agitated Saline)    Narrative · Left ventricular systolic function is normal. Estimated EF = 70%.  · Normal right ventricular cavity size, wall thickness, systolic function   and septal motion noted.  · Saline test results are negative.  · The aortic valve exhibits sclerosis.  · No evidence of pulmonary hypertension is present.  · There is no evidence of pericardial effusion.            Discharge Details      Lois Brennan   Home Medication Instructions KIMBERLEE:[id number]    Printed on:01/05/18 4391   Medication Information                      amLODIPine (NORVASC) 5 MG tablet  Take 1 tablet by mouth Daily.             donepezil (ARICEPT) 10 MG tablet  Take 10 mg by mouth Every Night.             levETIRAcetam (KEPPRA) 500 MG tablet  Take 1 tablet by mouth Every 12 (Twelve) Hours.             metFORMIN ER (GLUCOPHAGE-XR) 500 MG 24 hr tablet  Take 500 mg by mouth Daily With Breakfast.             metoprolol tartrate (LOPRESSOR) 25 MG  tablet  Take 0.5 tablets by mouth Every 12 (Twelve) Hours.             predniSONE (DELTASONE) 5 MG tablet  Take 7.5 mg by mouth Daily With Breakfast.             raNITIdine (ZANTAC) 300 MG tablet  Take 300 mg by mouth 2 (Two) Times a Day.             simvastatin (ZOCOR) 20 MG tablet  Take 20 mg by mouth Every Night.                 Discharge Disposition:  Skilled Nursing Facility (DC - External)    Discharge Diet:  Diet Instructions     Diet: Dysphagia; Thin Liquids, No Restrictions; Pureed       Discharge Diet:  Dysphagia   Fluid Consistency:  Thin Liquids, No Restrictions   Pureed Options:  Pureed                 Discharge Activity:   Activity Instructions     Activity as Tolerated                     Special Instructions:    Future Appointments  Date Time Provider Department Center   1/23/2018 3:30 PM Soy Bauman III, MD [facility] None       Additional Instructions for the Follow-ups that You Need to Schedule     Discharge Follow-up with PCP    As directed    Follow Up Details:  within 1 week for hospital follow up                     Time Spent on Discharge:  45 minutes    SHYANN Alexis  01/05/18  1:17 PM

## 2020-09-30 NOTE — PLAN OF CARE
Problem: Patient Care Overview (Adult)  Goal: Plan of Care Review  Outcome: Ongoing (interventions implemented as appropriate)   01/04/18 0325   Coping/Psychosocial Response Interventions   Plan Of Care Reviewed With patient   Outcome Evaluation   Outcome Summary/Follow up Plan Patient rested well tonight. Less trearful. Not complaining of pain or nausea.   Patient Care Overview   Progress improving       Problem: Fall Risk (Adult)  Goal: Identify Related Risk Factors and Signs and Symptoms  Outcome: Ongoing (interventions implemented as appropriate)      Problem: Pressure Ulcer Risk (Kranthi Scale) (Adult,Obstetrics,Pediatric)  Goal: Identify Related Risk Factors and Signs and Symptoms  Outcome: Ongoing (interventions implemented as appropriate)      Problem: Diabetes, Type 2 (Adult)  Goal: Signs and Symptoms of Listed Potential Problems Will be Absent or Manageable (Diabetes, Type 2)  Outcome: Ongoing (interventions implemented as appropriate)      Problem: Confusion, Acute (Adult)  Goal: Identify Related Risk Factors and Signs and Symptoms  Outcome: Ongoing (interventions implemented as appropriate)      Problem: Skin Integrity Impairment, Risk/Actual (Adult)  Goal: Identify Related Risk Factors and Signs and Symptoms  Outcome: Ongoing (interventions implemented as appropriate)         480

## 2021-02-20 NOTE — PROGRESS NOTES
"NEUROSURGERY PROGRESS NOTE     LOS: 6 days   Patient Care Team:  No Known Provider as PCP - General    Chief Complaint: Left-sided weakness and neglect.    Interval History:   Patient Complaints: None.  Patient Denies: Headache, nausea.    Review of Systems:   Musculoskeletal and Neurological systems were reviewed and are negative except for:   Neurological: positive for weakness    Vital Signs: Blood pressure 149/95, pulse 56, temperature 98.1 °F (36.7 °C), temperature source Oral, resp. rate 16, height 157.5 cm (62\"), weight 67.8 kg (149 lb 7.6 oz), SpO2 97 %.  Intake/Output:   Intake/Output Summary (Last 24 hours) at 12/15/17 0651  Last data filed at 12/15/17 0600   Gross per 24 hour   Intake              240 ml   Output              900 ml   Net             -660 ml       Physical Exam:  The patient awakens easily in is much more interactive and focused a couple of days ago.  Her speech is fluent.  She follows all commands.  She has less left-sided neglect.  There is also less right gaze preference.  Left extremity strength is 3/5.       Assessment/Plan:  1.  Multiple areas of intracranial hemorrhage with the largest being a right frontal ICH.  Multiple areas of hemorrhage potentially do to hemorrhagic conversion of embolic infarcts. Cardiac echo demonstrated atrial fibrillation.  2.  Atrial fibrillation.  3.  Dysphagia: Dysphagia diet.  4.  Hypertension.  5.  Disposition: Awaiting rehabilitation placement.  We will see patient in follow-up in the office with a new CT scan in a couple of weeks as per my follow-up orders.      Leo Proctor MD  12/15/17  6:51 AM      " No